# Patient Record
Sex: MALE | Race: WHITE | NOT HISPANIC OR LATINO | Employment: OTHER | URBAN - METROPOLITAN AREA
[De-identification: names, ages, dates, MRNs, and addresses within clinical notes are randomized per-mention and may not be internally consistent; named-entity substitution may affect disease eponyms.]

---

## 2018-09-07 ENCOUNTER — APPOINTMENT (OUTPATIENT)
Dept: LAB | Facility: CLINIC | Age: 83
End: 2018-09-07
Payer: MEDICARE

## 2018-09-07 ENCOUNTER — TRANSCRIBE ORDERS (OUTPATIENT)
Dept: ADMINISTRATIVE | Facility: HOSPITAL | Age: 83
End: 2018-09-07

## 2018-09-07 DIAGNOSIS — E78.5 HYPERLIPIDEMIA, UNSPECIFIED HYPERLIPIDEMIA TYPE: ICD-10-CM

## 2018-09-07 DIAGNOSIS — R35.1 NOCTURIA: ICD-10-CM

## 2018-09-07 DIAGNOSIS — K59.00 CONSTIPATION, UNSPECIFIED CONSTIPATION TYPE: Primary | ICD-10-CM

## 2018-09-07 DIAGNOSIS — K59.00 CONSTIPATION, UNSPECIFIED CONSTIPATION TYPE: ICD-10-CM

## 2018-09-07 DIAGNOSIS — R97.20 ELEVATED PROSTATE SPECIFIC ANTIGEN (PSA): ICD-10-CM

## 2018-09-07 DIAGNOSIS — I10 ESSENTIAL HYPERTENSION, MALIGNANT: ICD-10-CM

## 2018-09-07 LAB
ALBUMIN SERPL BCP-MCNC: 3.6 G/DL (ref 3.5–5)
ALP SERPL-CCNC: 68 U/L (ref 46–116)
ALT SERPL W P-5'-P-CCNC: 18 U/L (ref 12–78)
ANION GAP SERPL CALCULATED.3IONS-SCNC: 6 MMOL/L (ref 4–13)
AST SERPL W P-5'-P-CCNC: 14 U/L (ref 5–45)
BASOPHILS # BLD AUTO: 0.04 THOUSANDS/ΜL (ref 0–0.1)
BASOPHILS NFR BLD AUTO: 1 % (ref 0–1)
BILIRUB SERPL-MCNC: 0.71 MG/DL (ref 0.2–1)
BUN SERPL-MCNC: 18 MG/DL (ref 5–25)
CALCIUM SERPL-MCNC: 8.7 MG/DL (ref 8.3–10.1)
CHLORIDE SERPL-SCNC: 101 MMOL/L (ref 100–108)
CHOLEST SERPL-MCNC: 153 MG/DL (ref 50–200)
CO2 SERPL-SCNC: 32 MMOL/L (ref 21–32)
CREAT SERPL-MCNC: 1.08 MG/DL (ref 0.6–1.3)
EOSINOPHIL # BLD AUTO: 0.32 THOUSAND/ΜL (ref 0–0.61)
EOSINOPHIL NFR BLD AUTO: 4 % (ref 0–6)
ERYTHROCYTE [DISTWIDTH] IN BLOOD BY AUTOMATED COUNT: 13.1 % (ref 11.6–15.1)
GFR SERPL CREATININE-BSD FRML MDRD: 60 ML/MIN/1.73SQ M
GLUCOSE P FAST SERPL-MCNC: 83 MG/DL (ref 65–99)
HCT VFR BLD AUTO: 45.9 % (ref 36.5–49.3)
HDLC SERPL-MCNC: 52 MG/DL (ref 40–60)
HGB BLD-MCNC: 14.6 G/DL (ref 12–17)
IMM GRANULOCYTES # BLD AUTO: 0.02 THOUSAND/UL (ref 0–0.2)
IMM GRANULOCYTES NFR BLD AUTO: 0 % (ref 0–2)
LDLC SERPL CALC-MCNC: 85 MG/DL (ref 0–100)
LYMPHOCYTES # BLD AUTO: 1.89 THOUSANDS/ΜL (ref 0.6–4.47)
LYMPHOCYTES NFR BLD AUTO: 26 % (ref 14–44)
MCH RBC QN AUTO: 30.9 PG (ref 26.8–34.3)
MCHC RBC AUTO-ENTMCNC: 31.8 G/DL (ref 31.4–37.4)
MCV RBC AUTO: 97 FL (ref 82–98)
MONOCYTES # BLD AUTO: 0.74 THOUSAND/ΜL (ref 0.17–1.22)
MONOCYTES NFR BLD AUTO: 10 % (ref 4–12)
NEUTROPHILS # BLD AUTO: 4.41 THOUSANDS/ΜL (ref 1.85–7.62)
NEUTS SEG NFR BLD AUTO: 59 % (ref 43–75)
NONHDLC SERPL-MCNC: 101 MG/DL
NRBC BLD AUTO-RTO: 0 /100 WBCS
PLATELET # BLD AUTO: 164 THOUSANDS/UL (ref 149–390)
PMV BLD AUTO: 12.7 FL (ref 8.9–12.7)
POTASSIUM SERPL-SCNC: 3.9 MMOL/L (ref 3.5–5.3)
PROT SERPL-MCNC: 7.2 G/DL (ref 6.4–8.2)
RBC # BLD AUTO: 4.72 MILLION/UL (ref 3.88–5.62)
SODIUM SERPL-SCNC: 139 MMOL/L (ref 136–145)
TRIGL SERPL-MCNC: 82 MG/DL
WBC # BLD AUTO: 7.42 THOUSAND/UL (ref 4.31–10.16)

## 2018-09-07 PROCEDURE — 80061 LIPID PANEL: CPT

## 2018-09-07 PROCEDURE — 80053 COMPREHEN METABOLIC PANEL: CPT

## 2018-09-07 PROCEDURE — 85025 COMPLETE CBC W/AUTO DIFF WBC: CPT

## 2018-09-07 PROCEDURE — 36415 COLL VENOUS BLD VENIPUNCTURE: CPT

## 2018-11-28 DIAGNOSIS — I10 ESSENTIAL HYPERTENSION: Primary | ICD-10-CM

## 2018-11-28 DIAGNOSIS — K21.9 GASTROESOPHAGEAL REFLUX DISEASE WITHOUT ESOPHAGITIS: ICD-10-CM

## 2018-11-28 DIAGNOSIS — E78.2 MIXED HYPERLIPIDEMIA: ICD-10-CM

## 2018-11-29 RX ORDER — ROSUVASTATIN CALCIUM 5 MG/1
5 TABLET, COATED ORAL DAILY
Qty: 90 TABLET | Refills: 2 | Status: SHIPPED | OUTPATIENT
Start: 2018-11-29 | End: 2019-10-07 | Stop reason: SDUPTHER

## 2018-11-29 RX ORDER — LISINOPRIL AND HYDROCHLOROTHIAZIDE 12.5; 1 MG/1; MG/1
1 TABLET ORAL DAILY
Qty: 90 TABLET | Refills: 2 | Status: SHIPPED | OUTPATIENT
Start: 2018-11-29 | End: 2019-10-07 | Stop reason: SDUPTHER

## 2018-11-29 RX ORDER — OMEPRAZOLE 20 MG/1
20 CAPSULE, DELAYED RELEASE ORAL DAILY
Qty: 90 CAPSULE | Refills: 2 | Status: SHIPPED | OUTPATIENT
Start: 2018-11-29 | End: 2019-10-07 | Stop reason: SDUPTHER

## 2018-12-10 ENCOUNTER — APPOINTMENT (OUTPATIENT)
Dept: LAB | Facility: CLINIC | Age: 83
End: 2018-12-10
Payer: MEDICARE

## 2018-12-10 ENCOUNTER — TRANSCRIBE ORDERS (OUTPATIENT)
Dept: ADMINISTRATIVE | Facility: HOSPITAL | Age: 83
End: 2018-12-10

## 2018-12-10 DIAGNOSIS — E78.5 HYPERLIPIDEMIA, UNSPECIFIED HYPERLIPIDEMIA TYPE: Primary | ICD-10-CM

## 2018-12-10 DIAGNOSIS — E78.00 PURE HYPERCHOLESTEROLEMIA: ICD-10-CM

## 2018-12-10 DIAGNOSIS — I10 ESSENTIAL HYPERTENSION, MALIGNANT: ICD-10-CM

## 2018-12-10 DIAGNOSIS — E78.5 HYPERLIPIDEMIA, UNSPECIFIED HYPERLIPIDEMIA TYPE: ICD-10-CM

## 2018-12-10 LAB
ALBUMIN SERPL BCP-MCNC: 3.8 G/DL (ref 3.5–5)
ALP SERPL-CCNC: 64 U/L (ref 46–116)
ALT SERPL W P-5'-P-CCNC: 17 U/L (ref 12–78)
ANION GAP SERPL CALCULATED.3IONS-SCNC: 4 MMOL/L (ref 4–13)
AST SERPL W P-5'-P-CCNC: 13 U/L (ref 5–45)
BILIRUB SERPL-MCNC: 0.84 MG/DL (ref 0.2–1)
BUN SERPL-MCNC: 14 MG/DL (ref 5–25)
CALCIUM SERPL-MCNC: 9.7 MG/DL (ref 8.3–10.1)
CHLORIDE SERPL-SCNC: 102 MMOL/L (ref 100–108)
CO2 SERPL-SCNC: 32 MMOL/L (ref 21–32)
CREAT SERPL-MCNC: 1.03 MG/DL (ref 0.6–1.3)
GFR SERPL CREATININE-BSD FRML MDRD: 63 ML/MIN/1.73SQ M
GLUCOSE P FAST SERPL-MCNC: 80 MG/DL (ref 65–99)
POTASSIUM SERPL-SCNC: 3.9 MMOL/L (ref 3.5–5.3)
PROT SERPL-MCNC: 7.5 G/DL (ref 6.4–8.2)
PSA SERPL-MCNC: 4.3 NG/ML (ref 0–4)
SODIUM SERPL-SCNC: 138 MMOL/L (ref 136–145)

## 2018-12-10 PROCEDURE — 36415 COLL VENOUS BLD VENIPUNCTURE: CPT

## 2018-12-10 PROCEDURE — 80053 COMPREHEN METABOLIC PANEL: CPT

## 2018-12-10 PROCEDURE — 84153 ASSAY OF PSA TOTAL: CPT

## 2018-12-12 ENCOUNTER — OFFICE VISIT (OUTPATIENT)
Dept: FAMILY MEDICINE CLINIC | Facility: CLINIC | Age: 83
End: 2018-12-12
Payer: MEDICARE

## 2018-12-12 VITALS
BODY MASS INDEX: 30.3 KG/M2 | HEIGHT: 63 IN | DIASTOLIC BLOOD PRESSURE: 72 MMHG | SYSTOLIC BLOOD PRESSURE: 134 MMHG | OXYGEN SATURATION: 94 % | HEART RATE: 65 BPM | WEIGHT: 171 LBS | TEMPERATURE: 98.8 F

## 2018-12-12 DIAGNOSIS — K21.9 GASTROESOPHAGEAL REFLUX DISEASE WITHOUT ESOPHAGITIS: ICD-10-CM

## 2018-12-12 DIAGNOSIS — I10 ESSENTIAL HYPERTENSION: Primary | ICD-10-CM

## 2018-12-12 DIAGNOSIS — Z00.00 MEDICARE ANNUAL WELLNESS VISIT, SUBSEQUENT: ICD-10-CM

## 2018-12-12 DIAGNOSIS — E78.2 MIXED HYPERLIPIDEMIA: ICD-10-CM

## 2018-12-12 PROCEDURE — G0439 PPPS, SUBSEQ VISIT: HCPCS | Performed by: FAMILY MEDICINE

## 2018-12-12 PROCEDURE — 99214 OFFICE O/P EST MOD 30 MIN: CPT | Performed by: FAMILY MEDICINE

## 2018-12-12 NOTE — ASSESSMENT & PLAN NOTE
Patient has a central hypertension here today for general checkup and follow-up evaluation  He lives alone at this point his wife  5 years ago and he comes in for general checkup he is doing well could no new complaints or changes in his life  No chest pain shortness of breath or dizziness    Will continue the same medication lisinopril hydrochlorothiazide

## 2018-12-12 NOTE — ASSESSMENT & PLAN NOTE
Patient is here for lab review and cholesterol medication assessment  He stays on the statin drug at Crestor 5 mg and is doing well no new changes at this time will re-evaluate blood work at next visit

## 2018-12-12 NOTE — ASSESSMENT & PLAN NOTE
Patient has no further dyspepsia indigestion or blood in his stools doing well with omeprazole continue this and reassess at next appointment

## 2018-12-12 NOTE — PROGRESS NOTES
Assessment/Plan:       Problem List Items Addressed This Visit     Essential hypertension - Primary     Patient has a central hypertension here today for general checkup and follow-up evaluation  He lives alone at this point his wife  5 years ago and he comes in for general checkup he is doing well could no new complaints or changes in his life  No chest pain shortness of breath or dizziness  Will continue the same medication lisinopril hydrochlorothiazide         Relevant Orders    Comprehensive metabolic panel    Mixed hyperlipidemia     Patient is here for lab review and cholesterol medication assessment  He stays on the statin drug at Crestor 5 mg and is doing well no new changes at this time will re-evaluate blood work at next visit  Relevant Orders    Lipid panel    Gastroesophageal reflux disease without esophagitis     Patient has no further dyspepsia indigestion or blood in his stools doing well with omeprazole continue this and reassess at next appointment         Relevant Orders    CBC and differential    Medicare annual wellness visit, subsequent            Subjective:      Patient ID: Saturnino Poole is a 80 y o  male  HPI    The following portions of the patient's history were reviewed and updated as appropriate: allergies, current medications, past family history, past medical history, past social history, past surgical history and problem list     Review of Systems      Objective:      /72   Pulse 65   Temp 98 8 °F (37 1 °C)   Ht 5' 3" (1 6 m)   Wt 77 6 kg (171 lb)   SpO2 94%   BMI 30 29 kg/m²        Physical Exam     Data:    Laboratory Results: I have personally reviewed the pertinent laboratory results/reports   Radiology/Other Diagnostic Testing Results: I have personally reviewed pertinent reports         Lab Results   Component Value Date    WBC 7 42 2018    HGB 14 6 2018    HCT 45 9 2018    MCV 97 2018     2018     Lab Results Component Value Date    K 3 9 12/10/2018     12/10/2018    CO2 32 12/10/2018    BUN 14 12/10/2018    CREATININE 1 03 12/10/2018    GLUF 80 12/10/2018    CALCIUM 9 7 12/10/2018    AST 13 12/10/2018    ALT 17 12/10/2018    ALKPHOS 64 12/10/2018    EGFR 63 12/10/2018     Lab Results   Component Value Date    CHOLESTEROL 153 09/07/2018     Lab Results   Component Value Date    HDL 52 09/07/2018     Lab Results   Component Value Date    LDLCALC 85 09/07/2018     Lab Results   Component Value Date    TRIG 82 09/07/2018     No results found for: CHOLHDL  No results found for: [id number], TSH  No results found for: HGBA1C  Lab Results   Component Value Date    PSA 4 3 (H) 12/10/2018       [facility], DO

## 2018-12-12 NOTE — PATIENT INSTRUCTIONS
Hypertension   AMBULATORY CARE:   Hypertension  is high blood pressure (BP)  Your BP is the force of your blood moving against the walls of your arteries  Normal BP is less than 120/80  Prehypertension is between 120/80 and 139/89  Hypertension is 140/90 or higher  Hypertension causes your BP to get so high that your heart has to work much harder than normal  This can damage your heart  You can control hypertension with a healthy lifestyle or medicines  A controlled blood pressure helps protect your organs, such as your heart, lungs, brain, and kidneys  Common symptoms include the following:   · Headache     · Blurred vision     · Chest pain     · Dizziness or weakness     · Trouble breathing    · Nosebleeds  Call 911 for any of the following:   · You have discomfort in your chest that feels like squeezing, pressure, fullness, or pain  · You become confused or have difficulty speaking  · You suddenly feel lightheaded or have trouble breathing  · You have pain or discomfort in your back, neck, jaw, stomach, or arm  Seek care immediately if:   · You have a severe headache or vision loss  · You have weakness in an arm or leg  Contact your healthcare provider if:   · You feel faint, dizzy, confused, or drowsy  · You have been taking your BP medicine and your BP is still higher than your healthcare provider says it should be  · You have questions or concerns about your condition or care  Treatment for hypertension  may include medicine to lower your BP and lower your cholesterol level  A low cholesterol level helps prevent heart disease and makes it easier to control your blood pressure  You may also need to make lifestyle changes  Take your medicine exactly as directed  Manage hypertension:  Talk with your healthcare provider about these and other ways to manage hypertension:  · Check your BP at home  Sit and rest for 5 minutes before you take your BP   Extend your arm and support it on a flat surface  Your arm should be at the same level as your heart  Follow the directions that came with your BP monitor  If possible, take at least 2 BP readings each time  Take your BP at least twice a day at the same times each day, such as morning and evening  Keep a record of your BP readings and bring it to your follow-up visits  Ask your healthcare provider what your BP should be  · Limit sodium (salt) as directed  Too much sodium can affect your fluid balance  Check labels to find low-sodium or no-salt-added foods  Some low-sodium foods use potassium salts for flavor  Too much potassium can also cause health problems  Your healthcare provider will tell you how much sodium and potassium are safe for you to have in a day  He or she may recommend that you limit sodium to 2,300 mg a day  · Follow the meal plan recommended by your healthcare provider  A dietitian or your provider can give you more information on low-sodium plans or the DASH (Dietary Approaches to Stop Hypertension) eating plan  The DASH plan is low in sodium, unhealthy fats, and total fat  It is high in potassium, calcium, and fiber  · Exercise to maintain a healthy weight  Exercise at least 30 minutes per day, on most days of the week  This will help decrease your blood pressure  Ask your healthcare provider about the best exercise plan for you  · Decrease stress  This may help lower your BP  Learn ways to relax, such as deep breathing or listening to music  · Limit alcohol  Women should limit alcohol to 1 drink a day  Men should limit alcohol to 2 drinks a day  A drink of alcohol is 12 ounces of beer, 5 ounces of wine, or 1½ ounces of liquor  · Do not smoke  Nicotine and other chemicals in cigarettes and cigars can increase your BP and also cause lung damage  Ask your healthcare provider for information if you currently smoke and need help to quit  E-cigarettes or smokeless tobacco still contain nicotine  Talk to your healthcare provider before you use these products  · Manage any other health conditions you have  Health conditions such as diabetes can increase your risk for hypertension  Follow your healthcare provider's instructions and take all your medicines as directed  Follow up with your healthcare provider as directed: You will need to return to have your BP checked and to have other lab tests done  Write down your questions so you remember to ask them during your visits  © 2017 [employer] Information is for End User's use only and may not be sold, redistributed or otherwise used for commercial purposes  All illustrations and images included in CareNotes® are the copyrighted property of [employer]  or [employer]  The above information is an  only  It is not intended as medical advice for individual conditions or treatments  Talk to your doctor, nurse or pharmacist before following any medical regimen to see if it is safe and effective for you

## 2018-12-12 NOTE — PROGRESS NOTES
Assessment/Plan:       Problem List Items Addressed This Visit     Essential hypertension - Primary     Patient has a central hypertension here today for general checkup and follow-up evaluation  He lives alone at this point his wife  5 years ago and he comes in for general checkup he is doing well could no new complaints or changes in his life  No chest pain shortness of breath or dizziness  Will continue the same medication lisinopril hydrochlorothiazide         Mixed hyperlipidemia     Patient is here for lab review and cholesterol medication assessment  He stays on the statin drug at Crestor 5 mg and is doing well no new changes at this time will re-evaluate blood work at next visit  Gastroesophageal reflux disease without esophagitis     Patient has no further dyspepsia indigestion or blood in his stools doing well with omeprazole continue this and reassess at next appointment                 Subjective:      Patient ID: Carlitos Kennedy is a 80 y o  male      HPI    The following portions of the patient's history were reviewed and updated as appropriate: allergies, current medications, past family history, past medical history, past social history, past surgical history and problem list     Review of Systems      Objective:      /72   Pulse 65   Temp 98 8 °F (37 1 °C)   Ht 5' 3" (1 6 m)   Wt 77 6 kg (171 lb)   SpO2 94%   BMI 30 29 kg/m²        Physical Exam     Data:    Laboratory Results:   Radiology/Other Diagnostic Testing Results:      Lab Results   Component Value Date    WBC 7 42 2018    HGB 14 6 2018    HCT 45 9 2018    MCV 97 2018     2018     Lab Results   Component Value Date    K 3 9 12/10/2018     12/10/2018    CO2 32 12/10/2018    BUN 14 12/10/2018    CREATININE 1 03 12/10/2018    GLUF 80 12/10/2018    CALCIUM 9 7 12/10/2018    AST 13 12/10/2018    ALT 17 12/10/2018    ALKPHOS 64 12/10/2018    EGFR 63 12/10/2018     Lab Results Component Value Date    CHOLESTEROL 153 09/07/2018     Lab Results   Component Value Date    HDL 52 09/07/2018     Lab Results   Component Value Date    LDLCALC 85 09/07/2018     Lab Results   Component Value Date    TRIG 82 09/07/2018     No results found for: CHOLHDL  No results found for: [id number], TSH  No results found for: HGBA1C  Lab Results   Component Value Date    PSA 4 3 (H) 12/10/2018       Diogenes Chaudhari DO

## 2018-12-12 NOTE — PROGRESS NOTES
Assessment and Plan:    Problem List Items Addressed This Visit     None        Health Maintenance Due   Topic Date Due    Depression Screening PHQ  11/02/1926    Medicare Annual Wellness Visit (AWV)  11/02/[birth year]26    DTaP,Tdap,and Td Vaccines (1 - Tdap) 11/02/1947    Fall Risk  11/02/1991    Pneumococcal PPSV23/PCV13 65+ Years / Low and Medium Risk (1 of 2 - PCV13) 11/02/1991         HPI:  Jonathan Diamond is a 80 y o  male here for his Subsequent Wellness Visit  There is no problem list on file for this patient  No past medical history on file  No past surgical history on file  No family history on file  History   Smoking Status    Former Smoker   Smokeless Tobacco    Never Used     History   Alcohol use Not on file      History   Drug Use No       Current Outpatient Prescriptions   Medication Sig Dispense Refill    lisinopril-hydrochlorothiazide (PRINZIDE,ZESTORETIC) 10-12 5 MG per tablet Take 1 tablet by mouth daily 90 tablet 2    omeprazole (PriLOSEC) 20 mg delayed release capsule Take 1 capsule (20 mg total) by mouth daily 90 capsule 2    rosuvastatin (CRESTOR) 5 mg tablet Take 1 tablet (5 mg total) by mouth daily 90 tablet 2     No current facility-administered medications for this visit  No Known Allergies  Immunization History   Administered Date(s) Administered    Influenza 09/26/2018       Patient Care Team:  Denise Shaw DO as PCP - General (Family Medicine)    Medicare Screening Tests and Risk Assessments:  Last Medicare Wellness visit information reviewed, patient interviewed and updates made to the record today  Health Risk Assessment:  Patient rates overall health as fair  Patient feels that their physical health rating is Same  Eyesight was rated as Same  Hearing was rated as Same  Patient feels that their emotional and mental health rating is Same  Pain experienced by patient in the last 7 days has been None   Patient states that he has experienced no weight loss or gain in last 6 months  Emotional/Mental Health:  Patient has been feeling nervous/anxious  PHQ-9 Depression Screening:    Frequency of the following problems over the past two weeks:      1  Little interest or pleasure in doing things: 0 - not at all      2  Feeling down, depressed, or hopeless: 0 - not at all  PHQ-2 Score: 0          Broken Bones/Falls: Fall Risk Assessment:    In the past year, patient has experienced: No history of falling in past year          Bladder/Bowel:  Patient has not leaked urine accidently in the last six months  Patient reports no loss of bowel control  Immunizations:  Patient has had a flu vaccination within the last year  Patient has not received a pneumonia shot  Patient has not received a shingles shot  Patient has not received tetanus/diphtheria shot  Home Safety:  Patient has trouble with stairs inside or outside of their home  Patient currently reports that there are no safety hazards present in home, working smoke alarms, no working carbon monoxide detectors  Preventative Screenings:   no prostate cancer screen performed, no colon cancer screen completed, no cholesterol screen completed, no glaucoma eye exam completed    Nutrition:  Current diet: Regular with servings of the following:    Medications:  Patient is not currently taking any over-the-counter supplements  Patient is able to manage medications  Lifestyle Choices:  Patient reports no tobacco use  Patient has not smoked or used tobacco in the past   Patient reports no alcohol use  Patient drives a vehicle  Patient does not wear seat belt  Current level of exercise of physical activity described by patient as: Active as can be          Activities of Daily Living:  Can get out of bed by his or her self, able to dress self, able to make own meals, able to do own shopping, able to bathe self, can do own laundry/housekeeping, can manage own money, pay bills and track expenses    Previous Hospitalizations:  No hospitalization or ED visit in past 12 months        Advanced Directives:  Patient has decided on a power of   Patient has spoken to designated power of   Patient has completed advanced directive  Preventative Screening/Counseling:      Cardiovascular:      General: Risks and Benefits Discussed      Counseling: Healthy Diet and Healthy Weight          Diabetes:      General: Screening Not Indicated      Counseling: Healthy Diet and Healthy Weight          Colorectal Cancer:      General: Risks and Benefits Discussed      Counseling: high fiber diet          Prostate Cancer:      General: Screening Not Indicated          Osteoporosis:      General: Screening Not Indicated          AAA:      General: Risks and Benefits Discussed          Glaucoma:      General: Risks and Benefits Discussed and Screening Current          HIV:      General: Screening Not Indicated          Hepatitis C:      General: Screening Not Indicated        Advanced Directives:   End of life assessment reviewed with patient

## 2018-12-12 NOTE — PROGRESS NOTES
Assessment/Plan:       Problem List Items Addressed This Visit     Essential hypertension - Primary     Patient has a central hypertension here today for general checkup and follow-up evaluation  He lives alone at this point his wife  5 years ago and he comes in for general checkup he is doing well could no new complaints or changes in his life  No chest pain shortness of breath or dizziness  Will continue the same medication lisinopril hydrochlorothiazide         Relevant Orders    Comprehensive metabolic panel    Mixed hyperlipidemia     Patient is here for lab review and cholesterol medication assessment  He stays on the statin drug at Crestor 5 mg and is doing well no new changes at this time will re-evaluate blood work at next visit  Relevant Orders    Lipid panel    Gastroesophageal reflux disease without esophagitis     Patient has no further dyspepsia indigestion or blood in his stools doing well with omeprazole continue this and reassess at next appointment         Relevant Orders    CBC and differential    Medicare annual wellness visit, subsequent            Subjective:      Patient ID: Carlitos Kennedy is a 80 y o  male  Patient presents for checkup today review of labs and medications he is feeling well in general no new complaints or problems at this time  The following portions of the patient's history were reviewed and updated as appropriate: allergies, current medications, past family history, past medical history, past social history, past surgical history and problem list     Review of Systems   Constitutional: Negative for chills, fatigue and fever  HENT: Negative for congestion, nosebleeds, rhinorrhea, sinus pressure and sore throat  Eyes: Negative for discharge and redness  Respiratory: Negative for cough and shortness of breath  Cardiovascular: Negative for chest pain, palpitations and leg swelling  Gastrointestinal: Positive for constipation   Negative for abdominal pain, blood in stool and nausea  Endocrine: Negative for cold intolerance, heat intolerance and polyuria  Genitourinary: Negative for dysuria and frequency  Occasional nocturia once or twice at night   Musculoskeletal: Positive for back pain  Negative for arthralgias and myalgias  Skin: Negative for rash  Neurological: Negative for dizziness, weakness and headaches  Hematological: Negative for adenopathy  Psychiatric/Behavioral: Negative for behavioral problems and sleep disturbance  The patient is not nervous/anxious  Objective:      /72   Pulse 65   Temp 98 8 °F (37 1 °C)   Ht 5' 3" (1 6 m)   Wt 77 6 kg (171 lb)   SpO2 94%   BMI 30 29 kg/m²        Physical Exam   Constitutional: He is oriented to person, place, and time  He appears well-developed and well-nourished  HENT:   Head: Normocephalic and atraumatic  Right Ear: External ear normal    Left Ear: External ear normal    Nose: Nose normal    Mouth/Throat: Oropharynx is clear and moist    Eyes: Pupils are equal, round, and reactive to light  Conjunctivae and EOM are normal  No scleral icterus  Neck: Normal range of motion  Neck supple  No JVD present  No thyromegaly present  Cardiovascular: Normal rate, regular rhythm and normal heart sounds  No murmur heard  Pulmonary/Chest: Effort normal and breath sounds normal  He has no wheezes  He has no rales  He exhibits no tenderness  Abdominal: Soft  Bowel sounds are normal  He exhibits no distension and no mass  There is no tenderness  There is no rebound and no guarding  Musculoskeletal: Normal range of motion  He exhibits no edema, tenderness or deformity  Bilateral lower extremity edema unchanged from past visit this is 1 to 2+   Lymphadenopathy:     He has no cervical adenopathy  Neurological: He is alert and oriented to person, place, and time  He has normal reflexes  No cranial nerve deficit  Skin: Skin is warm and dry  No rash noted   No erythema  Psychiatric: He has a normal mood and affect  His behavior is normal  Judgment and thought content normal    Nursing note and vitals reviewed  Data:    Laboratory Results: I have personally reviewed the pertinent laboratory results/reports   Radiology/Other Diagnostic Testing Results: I have personally reviewed pertinent reports         Lab Results   Component Value Date    WBC 7 42 09/07/2018    HGB 14 6 09/07/2018    HCT 45 9 09/07/2018    MCV 97 09/07/2018     09/07/2018     Lab Results   Component Value Date    K 3 9 12/10/2018     12/10/2018    CO2 32 12/10/2018    BUN 14 12/10/2018    CREATININE 1 03 12/10/2018    GLUF 80 12/10/2018    CALCIUM 9 7 12/10/2018    AST 13 12/10/2018    ALT 17 12/10/2018    ALKPHOS 64 12/10/2018    EGFR 63 12/10/2018     Lab Results   Component Value Date    CHOLESTEROL 153 09/07/2018     Lab Results   Component Value Date    HDL 52 09/07/2018     Lab Results   Component Value Date    LDLCALC 85 09/07/2018     Lab Results   Component Value Date    TRIG 82 09/07/2018     No results found for: CHOLHDL  No results found for: [id number], TSH  No results found for: HGBA1C  Lab Results   Component Value Date    PSA 4 3 (H) 12/10/2018       Francesca Moffett DO

## 2019-03-12 ENCOUNTER — OFFICE VISIT (OUTPATIENT)
Dept: FAMILY MEDICINE CLINIC | Facility: CLINIC | Age: 84
End: 2019-03-12
Payer: MEDICARE

## 2019-03-12 VITALS
BODY MASS INDEX: 30.55 KG/M2 | HEIGHT: 63 IN | SYSTOLIC BLOOD PRESSURE: 121 MMHG | WEIGHT: 172.4 LBS | OXYGEN SATURATION: 94 % | DIASTOLIC BLOOD PRESSURE: 80 MMHG | TEMPERATURE: 98.4 F | HEART RATE: 76 BPM

## 2019-03-12 DIAGNOSIS — E78.2 MIXED HYPERLIPIDEMIA: ICD-10-CM

## 2019-03-12 DIAGNOSIS — I10 ESSENTIAL HYPERTENSION: Primary | ICD-10-CM

## 2019-03-12 DIAGNOSIS — K21.9 GASTROESOPHAGEAL REFLUX DISEASE WITHOUT ESOPHAGITIS: ICD-10-CM

## 2019-03-12 DIAGNOSIS — Z12.5 SCREENING FOR PROSTATE CANCER: ICD-10-CM

## 2019-03-12 PROCEDURE — 99213 OFFICE O/P EST LOW 20 MIN: CPT | Performed by: FAMILY MEDICINE

## 2019-03-12 NOTE — ASSESSMENT & PLAN NOTE
Mixed hyperlipidemia with good control of total cholesterol at 153 HDL 52 LDL 85 from last profile done in the fall high will repeat his lipid profile for the next office visit    Continue on his statin medication he has no side effects or problems at this time

## 2019-03-12 NOTE — ASSESSMENT & PLAN NOTE
GERD symptoms controlled well with omeprazole at this time no changes to his current treatment plan continue this medication indefinitely    Patient is instructed to notify me immediately or go to the emergency room if he sees blood in his bowel movements

## 2019-03-12 NOTE — ASSESSMENT & PLAN NOTE
Essential hypertension well controlled at 120/80 at this time left arm    Continue his current medication without change at this time and follow up on a 4 month interval

## 2019-03-12 NOTE — PROGRESS NOTES
Assessment/Plan:       Problem List Items Addressed This Visit        Digestive    Gastroesophageal reflux disease without esophagitis     GERD symptoms controlled well with omeprazole at this time no changes to his current treatment plan continue this medication indefinitely  Patient is instructed to notify me immediately or go to the emergency room if he sees blood in his bowel movements            Cardiovascular and Mediastinum    Essential hypertension - Primary     Essential hypertension well controlled at 120/80 at this time left arm  Continue his current medication without change at this time and follow up on a 4 month interval            Other    Mixed hyperlipidemia     Mixed hyperlipidemia with good control of total cholesterol at 153 HDL 52 LDL 85 from last profile done in the fall high will repeat his lipid profile for the next office visit  Continue on his statin medication he has no side effects or problems at this time                 Subjective:      Patient ID: Estela Vasquez is a 80 y o  male  This patient presents today for follow-up care and review of his health status and lab work review of his medications and discussion of his overall well-being  He lives alone at this time at age 80 and wants to remain independent  He continues to drive and carry out his normal activities of daily living  He is alert and oriented not complaining of memory loss  His wife  several years ago and his family is not in the immediate area  The following portions of the patient's history were reviewed and updated as appropriate: allergies, current medications, past family history, past medical history, past social history, past surgical history and problem list     Review of Systems   Constitutional: Negative for chills, fatigue and fever  HENT: Negative for congestion, nosebleeds, rhinorrhea, sinus pressure and sore throat  Eyes: Negative for discharge and redness     Respiratory: Negative for cough and shortness of breath  Cardiovascular: Negative for chest pain, palpitations and leg swelling  Gastrointestinal: Negative for abdominal pain, blood in stool and nausea  Endocrine: Negative for cold intolerance, heat intolerance and polyuria  Genitourinary: Negative for dysuria and frequency  Musculoskeletal: Negative for arthralgias, back pain and myalgias  Skin: Negative for rash  Neurological: Negative for dizziness, weakness and headaches  Hematological: Negative for adenopathy  Psychiatric/Behavioral: Negative for behavioral problems and sleep disturbance  The patient is not nervous/anxious  Objective:      /80 (BP Location: Left arm, Patient Position: Sitting)   Pulse 76   Temp 98 4 °F (36 9 °C) (Tympanic)   Ht 5' 3" (1 6 m)   Wt 78 2 kg (172 lb 6 4 oz)   SpO2 94%   BMI 30 54 kg/m²        Physical Exam   Constitutional: He is oriented to person, place, and time  He appears well-developed and well-nourished  HENT:   Head: Normocephalic and atraumatic  Right Ear: External ear normal    Left Ear: External ear normal    Nose: Nose normal    Mouth/Throat: Oropharynx is clear and moist    Eyes: Pupils are equal, round, and reactive to light  Conjunctivae and EOM are normal  No scleral icterus  Neck: Normal range of motion  Neck supple  No JVD present  No thyromegaly present  Cardiovascular: Normal rate, regular rhythm and normal heart sounds  No murmur heard  Pulmonary/Chest: Effort normal and breath sounds normal  He has no wheezes  He has no rales  He exhibits no tenderness  Abdominal: Soft  Bowel sounds are normal  He exhibits no distension and no mass  There is no tenderness  There is no rebound and no guarding  Musculoskeletal: Normal range of motion  He exhibits no edema, tenderness or deformity  Mild low back pain lumbar 1 through 5 bilateral hip pain but good range of motion overall no knee pain ankle or foot pain at this time  Lymphadenopathy:     He has no cervical adenopathy  Neurological: He is alert and oriented to person, place, and time  He has normal reflexes  He displays normal reflexes  No cranial nerve deficit  Skin: Skin is warm and dry  No rash noted  No erythema  Psychiatric: He has a normal mood and affect  His behavior is normal  Judgment and thought content normal    Nursing note and vitals reviewed  Data:    Laboratory Results: I have personally reviewed the pertinent laboratory results/reports   Radiology/Other Diagnostic Testing Results: I have personally reviewed pertinent reports         Lab Results   Component Value Date    WBC 7 42 09/07/2018    HGB 14 6 09/07/2018    HCT 45 9 09/07/2018    MCV 97 09/07/2018     09/07/2018     Lab Results   Component Value Date    K 3 9 12/10/2018     12/10/2018    CO2 32 12/10/2018    BUN 14 12/10/2018    CREATININE 1 03 12/10/2018    GLUF 80 12/10/2018    CALCIUM 9 7 12/10/2018    AST 13 12/10/2018    ALT 17 12/10/2018    ALKPHOS 64 12/10/2018    EGFR 63 12/10/2018     Lab Results   Component Value Date    CHOLESTEROL 153 09/07/2018     Lab Results   Component Value Date    HDL 52 09/07/2018     Lab Results   Component Value Date    LDLCALC 85 09/07/2018     Lab Results   Component Value Date    TRIG 82 09/07/2018     No results found for: CHOLHDL  No results found for: [id number], TSH  No results found for: HGBA1C  Lab Results   Component Value Date    PSA 4 3 (H) 12/10/2018       Saba Olmedo DO

## 2019-03-12 NOTE — PATIENT INSTRUCTIONS

## 2019-06-10 ENCOUNTER — APPOINTMENT (OUTPATIENT)
Dept: LAB | Facility: CLINIC | Age: 84
End: 2019-06-10
Payer: MEDICARE

## 2019-06-10 DIAGNOSIS — K21.9 GASTROESOPHAGEAL REFLUX DISEASE WITHOUT ESOPHAGITIS: ICD-10-CM

## 2019-06-10 DIAGNOSIS — E78.2 MIXED HYPERLIPIDEMIA: ICD-10-CM

## 2019-06-10 DIAGNOSIS — I10 ESSENTIAL HYPERTENSION: ICD-10-CM

## 2019-06-10 DIAGNOSIS — Z12.5 SCREENING FOR PROSTATE CANCER: ICD-10-CM

## 2019-06-10 LAB
ALBUMIN SERPL BCP-MCNC: 3.7 G/DL (ref 3.5–5)
ALP SERPL-CCNC: 65 U/L (ref 46–116)
ALT SERPL W P-5'-P-CCNC: 19 U/L (ref 12–78)
ANION GAP SERPL CALCULATED.3IONS-SCNC: 4 MMOL/L (ref 4–13)
AST SERPL W P-5'-P-CCNC: 14 U/L (ref 5–45)
BASOPHILS # BLD AUTO: 0.04 THOUSANDS/ΜL (ref 0–0.1)
BASOPHILS NFR BLD AUTO: 1 % (ref 0–1)
BILIRUB SERPL-MCNC: 0.78 MG/DL (ref 0.2–1)
BUN SERPL-MCNC: 16 MG/DL (ref 5–25)
CALCIUM SERPL-MCNC: 9 MG/DL (ref 8.3–10.1)
CHLORIDE SERPL-SCNC: 103 MMOL/L (ref 100–108)
CHOLEST SERPL-MCNC: 155 MG/DL (ref 50–200)
CO2 SERPL-SCNC: 33 MMOL/L (ref 21–32)
CREAT SERPL-MCNC: 1.03 MG/DL (ref 0.6–1.3)
EOSINOPHIL # BLD AUTO: 0.36 THOUSAND/ΜL (ref 0–0.61)
EOSINOPHIL NFR BLD AUTO: 5 % (ref 0–6)
ERYTHROCYTE [DISTWIDTH] IN BLOOD BY AUTOMATED COUNT: 13.1 % (ref 11.6–15.1)
GFR SERPL CREATININE-BSD FRML MDRD: 63 ML/MIN/1.73SQ M
GLUCOSE P FAST SERPL-MCNC: 82 MG/DL (ref 65–99)
HCT VFR BLD AUTO: 43.9 % (ref 36.5–49.3)
HDLC SERPL-MCNC: 53 MG/DL (ref 40–60)
HGB BLD-MCNC: 14.1 G/DL (ref 12–17)
IMM GRANULOCYTES # BLD AUTO: 0.02 THOUSAND/UL (ref 0–0.2)
IMM GRANULOCYTES NFR BLD AUTO: 0 % (ref 0–2)
LDLC SERPL CALC-MCNC: 83 MG/DL (ref 0–100)
LYMPHOCYTES # BLD AUTO: 1.61 THOUSANDS/ΜL (ref 0.6–4.47)
LYMPHOCYTES NFR BLD AUTO: 24 % (ref 14–44)
MCH RBC QN AUTO: 30.7 PG (ref 26.8–34.3)
MCHC RBC AUTO-ENTMCNC: 32.1 G/DL (ref 31.4–37.4)
MCV RBC AUTO: 96 FL (ref 82–98)
MONOCYTES # BLD AUTO: 0.77 THOUSAND/ΜL (ref 0.17–1.22)
MONOCYTES NFR BLD AUTO: 12 % (ref 4–12)
NEUTROPHILS # BLD AUTO: 3.87 THOUSANDS/ΜL (ref 1.85–7.62)
NEUTS SEG NFR BLD AUTO: 58 % (ref 43–75)
NONHDLC SERPL-MCNC: 102 MG/DL
NRBC BLD AUTO-RTO: 0 /100 WBCS
PLATELET # BLD AUTO: 168 THOUSANDS/UL (ref 149–390)
PMV BLD AUTO: 12.8 FL (ref 8.9–12.7)
POTASSIUM SERPL-SCNC: 3.9 MMOL/L (ref 3.5–5.3)
PROT SERPL-MCNC: 7.1 G/DL (ref 6.4–8.2)
RBC # BLD AUTO: 4.59 MILLION/UL (ref 3.88–5.62)
SODIUM SERPL-SCNC: 140 MMOL/L (ref 136–145)
TRIGL SERPL-MCNC: 93 MG/DL
WBC # BLD AUTO: 6.67 THOUSAND/UL (ref 4.31–10.16)

## 2019-06-10 PROCEDURE — G0103 PSA SCREENING: HCPCS

## 2019-06-10 PROCEDURE — 80061 LIPID PANEL: CPT

## 2019-06-10 PROCEDURE — 36415 COLL VENOUS BLD VENIPUNCTURE: CPT

## 2019-06-10 PROCEDURE — 80053 COMPREHEN METABOLIC PANEL: CPT

## 2019-06-10 PROCEDURE — 85025 COMPLETE CBC W/AUTO DIFF WBC: CPT

## 2019-06-11 LAB
PSA FREE MFR SERPL: 31.2 %
PSA FREE SERPL-MCNC: 1.34 NG/ML
PSA SERPL-MCNC: 4.3 NG/ML (ref 0–4)

## 2019-06-12 ENCOUNTER — OFFICE VISIT (OUTPATIENT)
Dept: FAMILY MEDICINE CLINIC | Facility: CLINIC | Age: 84
End: 2019-06-12
Payer: MEDICARE

## 2019-06-12 VITALS
DIASTOLIC BLOOD PRESSURE: 70 MMHG | HEIGHT: 63 IN | HEART RATE: 75 BPM | SYSTOLIC BLOOD PRESSURE: 120 MMHG | WEIGHT: 176 LBS | TEMPERATURE: 99 F | OXYGEN SATURATION: 95 % | BODY MASS INDEX: 31.18 KG/M2

## 2019-06-12 DIAGNOSIS — R97.20 ELEVATED PSA: ICD-10-CM

## 2019-06-12 DIAGNOSIS — M25.472 ANKLE EDEMA, BILATERAL: ICD-10-CM

## 2019-06-12 DIAGNOSIS — M25.471 ANKLE EDEMA, BILATERAL: ICD-10-CM

## 2019-06-12 DIAGNOSIS — K21.9 GASTROESOPHAGEAL REFLUX DISEASE WITHOUT ESOPHAGITIS: ICD-10-CM

## 2019-06-12 DIAGNOSIS — E78.2 MIXED HYPERLIPIDEMIA: ICD-10-CM

## 2019-06-12 DIAGNOSIS — Z00.00 MEDICARE ANNUAL WELLNESS VISIT, SUBSEQUENT: ICD-10-CM

## 2019-06-12 DIAGNOSIS — I10 ESSENTIAL HYPERTENSION: Primary | ICD-10-CM

## 2019-06-12 PROCEDURE — 99214 OFFICE O/P EST MOD 30 MIN: CPT | Performed by: FAMILY MEDICINE

## 2019-06-17 ENCOUNTER — OFFICE VISIT (OUTPATIENT)
Dept: FAMILY MEDICINE CLINIC | Facility: CLINIC | Age: 84
End: 2019-06-17
Payer: MEDICARE

## 2019-06-17 VITALS
WEIGHT: 176 LBS | BODY MASS INDEX: 31.18 KG/M2 | TEMPERATURE: 98.8 F | OXYGEN SATURATION: 95 % | HEIGHT: 63 IN | SYSTOLIC BLOOD PRESSURE: 124 MMHG | DIASTOLIC BLOOD PRESSURE: 70 MMHG | HEART RATE: 84 BPM

## 2019-06-17 DIAGNOSIS — M54.50 ACUTE BILATERAL LOW BACK PAIN WITHOUT SCIATICA: Primary | ICD-10-CM

## 2019-06-17 PROCEDURE — 99213 OFFICE O/P EST LOW 20 MIN: CPT | Performed by: FAMILY MEDICINE

## 2019-09-12 ENCOUNTER — OFFICE VISIT (OUTPATIENT)
Dept: FAMILY MEDICINE CLINIC | Facility: CLINIC | Age: 84
End: 2019-09-12
Payer: MEDICARE

## 2019-09-12 VITALS
DIASTOLIC BLOOD PRESSURE: 62 MMHG | HEIGHT: 63 IN | HEART RATE: 69 BPM | SYSTOLIC BLOOD PRESSURE: 112 MMHG | WEIGHT: 171.4 LBS | OXYGEN SATURATION: 96 % | TEMPERATURE: 99.5 F | BODY MASS INDEX: 30.37 KG/M2

## 2019-09-12 DIAGNOSIS — M25.471 ANKLE EDEMA, BILATERAL: ICD-10-CM

## 2019-09-12 DIAGNOSIS — Z23 ENCOUNTER FOR VACCINATION: ICD-10-CM

## 2019-09-12 DIAGNOSIS — K21.9 GASTROESOPHAGEAL REFLUX DISEASE WITHOUT ESOPHAGITIS: ICD-10-CM

## 2019-09-12 DIAGNOSIS — I10 ESSENTIAL HYPERTENSION: Primary | ICD-10-CM

## 2019-09-12 DIAGNOSIS — E78.2 MIXED HYPERLIPIDEMIA: ICD-10-CM

## 2019-09-12 DIAGNOSIS — M25.472 ANKLE EDEMA, BILATERAL: ICD-10-CM

## 2019-09-12 PROCEDURE — 99213 OFFICE O/P EST LOW 20 MIN: CPT | Performed by: FAMILY MEDICINE

## 2019-09-12 PROCEDURE — G0008 ADMIN INFLUENZA VIRUS VAC: HCPCS

## 2019-09-12 PROCEDURE — 90662 IIV NO PRSV INCREASED AG IM: CPT

## 2019-09-12 NOTE — ASSESSMENT & PLAN NOTE
Bilateral lower extremity edema from dependent edema he has no symptoms or signs of worsening shortness of breath or any symptoms of heart failure he lost about 5-6 lb since his last visit he remains active and is doing well overall no change in regimen avoid sodium in his diet

## 2019-09-12 NOTE — PROGRESS NOTES
Assessment/Plan:       Problem List Items Addressed This Visit        Digestive    Gastroesophageal reflux disease without esophagitis     GERD symptoms under good control with Prilosec 20 mg continue medication without change in follow-up in 3 months            Cardiovascular and Mediastinum    Essential hypertension - Primary     Essential hypertension doing well with current medication at this time no change in his plan he will continue with the lisinopril hydrochlorothiazide all lab work reviewed and I will follow up with him in 3 months            Other    Mixed hyperlipidemia     Mixed hyperlipidemia continue with Crestor 5 mg and follow up in 3 months no laboratory work at next visit will repeated in 6 months         Ankle edema, bilateral     Bilateral lower extremity edema from dependent edema he has no symptoms or signs of worsening shortness of breath or any symptoms of heart failure he lost about 5-6 lb since his last visit he remains active and is doing well overall no change in regimen avoid sodium in his diet                 Subjective:      Patient ID: Pierre Cheung is a 80 y o  male  Patient presents today for general checkup three-month evaluation he is doing relatively well overall without any new complaints today he is here for review of his laboratory work and a flu vaccine today      The following portions of the patient's history were reviewed and updated as appropriate: allergies, current medications, past family history, past medical history, past social history, past surgical history and problem list     Review of Systems   Constitutional: Negative for chills, fatigue and fever  HENT: Negative for congestion, nosebleeds, rhinorrhea, sinus pressure and sore throat  Eyes: Negative for discharge and redness  Respiratory: Negative for cough and shortness of breath  Cardiovascular: Negative for chest pain, palpitations and leg swelling     Gastrointestinal: Negative for abdominal pain, blood in stool and nausea  Endocrine: Negative for cold intolerance, heat intolerance and polyuria  Genitourinary: Negative for dysuria and frequency  Musculoskeletal: Negative for arthralgias, back pain and myalgias  Skin: Negative for rash  Neurological: Negative for dizziness, weakness and headaches  Hematological: Negative for adenopathy  Psychiatric/Behavioral: Negative for behavioral problems and sleep disturbance  The patient is not nervous/anxious  Objective:      /62 (BP Location: Left arm, Patient Position: Sitting)   Pulse 69   Temp 99 5 °F (37 5 °C) (Tympanic)   Ht 5' 3" (1 6 m)   Wt 77 7 kg (171 lb 6 4 oz)   SpO2 96%   BMI 30 36 kg/m²        Physical Exam   Constitutional: He is oriented to person, place, and time  He appears well-developed and well-nourished  HENT:   Head: Normocephalic and atraumatic  Right Ear: External ear normal    Left Ear: External ear normal    Nose: Nose normal    Mouth/Throat: Oropharynx is clear and moist    Eyes: Pupils are equal, round, and reactive to light  Conjunctivae and EOM are normal  No scleral icterus  Neck: Normal range of motion  Neck supple  No JVD present  No thyromegaly present  Cardiovascular: Normal rate, regular rhythm and normal heart sounds  No murmur heard  Pulmonary/Chest: Effort normal and breath sounds normal  He has no wheezes  He has no rales  He exhibits no tenderness  Abdominal: Soft  Bowel sounds are normal  He exhibits no distension and no mass  There is no tenderness  There is no rebound and no guarding  Musculoskeletal: Normal range of motion  He exhibits no edema, tenderness or deformity  Lymphadenopathy:     He has no cervical adenopathy  Neurological: He is alert and oriented to person, place, and time  He has normal reflexes  He displays normal reflexes  No cranial nerve deficit  Skin: Skin is warm and dry  No rash noted  No erythema     Psychiatric: He has a normal mood and [FreeTextEntry1] : patient presents for 3 month follow up\par  affect  His behavior is normal  Judgment and thought content normal    Nursing note and vitals reviewed  Data:    Laboratory Results: I have personally reviewed the pertinent laboratory results/reports   Radiology/Other Diagnostic Testing Results: I have personally reviewed pertinent reports         Lab Results   Component Value Date    WBC 6 67 06/10/2019    HGB 14 1 06/10/2019    HCT 43 9 06/10/2019    MCV 96 06/10/2019     06/10/2019     Lab Results   Component Value Date    K 3 9 06/10/2019     06/10/2019    CO2 33 (H) 06/10/2019    BUN 16 06/10/2019    CREATININE 1 03 06/10/2019    GLUF 82 06/10/2019    CALCIUM 9 0 06/10/2019    AST 14 06/10/2019    ALT 19 06/10/2019    ALKPHOS 65 06/10/2019    EGFR 63 06/10/2019     Lab Results   Component Value Date    CHOLESTEROL 155 06/10/2019    CHOLESTEROL 153 09/07/2018     Lab Results   Component Value Date    HDL 53 06/10/2019    HDL 52 09/07/2018     Lab Results   Component Value Date    LDLCALC 83 06/10/2019    LDLCALC 85 09/07/2018     Lab Results   Component Value Date    TRIG 93 06/10/2019    TRIG 82 09/07/2018     No results found for: CHOLHDL  No results found for: [id number], TSH  No results found for: HGBA1C  Lab Results   Component Value Date    PSA 4 3 (H) 06/10/2019       Pearl Granados DO [de-identified] : She is feeling well and denies any health changes.

## 2019-09-12 NOTE — ASSESSMENT & PLAN NOTE
GERD symptoms under good control with Prilosec 20 mg continue medication without change in follow-up in 3 months

## 2019-09-12 NOTE — ASSESSMENT & PLAN NOTE
Mixed hyperlipidemia continue with Crestor 5 mg and follow up in 3 months no laboratory work at next visit will repeated in 6 months

## 2019-09-12 NOTE — ASSESSMENT & PLAN NOTE
Essential hypertension doing well with current medication at this time no change in his plan he will continue with the lisinopril hydrochlorothiazide all lab work reviewed and I will follow up with him in 3 months

## 2019-10-07 DIAGNOSIS — I10 ESSENTIAL HYPERTENSION: ICD-10-CM

## 2019-10-07 DIAGNOSIS — K21.9 GASTROESOPHAGEAL REFLUX DISEASE WITHOUT ESOPHAGITIS: ICD-10-CM

## 2019-10-07 DIAGNOSIS — E78.2 MIXED HYPERLIPIDEMIA: ICD-10-CM

## 2019-10-07 RX ORDER — OMEPRAZOLE 20 MG/1
20 CAPSULE, DELAYED RELEASE ORAL DAILY
Qty: 90 CAPSULE | Refills: 2 | Status: SHIPPED | OUTPATIENT
Start: 2019-10-07 | End: 2020-03-25 | Stop reason: SDUPTHER

## 2019-10-07 RX ORDER — LISINOPRIL AND HYDROCHLOROTHIAZIDE 12.5; 1 MG/1; MG/1
1 TABLET ORAL DAILY
Qty: 90 TABLET | Refills: 2 | Status: SHIPPED | OUTPATIENT
Start: 2019-10-07 | End: 2020-03-25 | Stop reason: SDUPTHER

## 2019-10-07 RX ORDER — ROSUVASTATIN CALCIUM 5 MG/1
5 TABLET, COATED ORAL DAILY
Qty: 90 TABLET | Refills: 2 | Status: SHIPPED | OUTPATIENT
Start: 2019-10-07 | End: 2020-03-25 | Stop reason: SDUPTHER

## 2019-12-18 ENCOUNTER — OFFICE VISIT (OUTPATIENT)
Dept: FAMILY MEDICINE CLINIC | Facility: CLINIC | Age: 84
End: 2019-12-18
Payer: MEDICARE

## 2019-12-18 VITALS
BODY MASS INDEX: 31.11 KG/M2 | DIASTOLIC BLOOD PRESSURE: 70 MMHG | HEART RATE: 60 BPM | WEIGHT: 175.6 LBS | SYSTOLIC BLOOD PRESSURE: 132 MMHG | HEIGHT: 63 IN | OXYGEN SATURATION: 97 %

## 2019-12-18 DIAGNOSIS — K21.9 GASTROESOPHAGEAL REFLUX DISEASE WITHOUT ESOPHAGITIS: ICD-10-CM

## 2019-12-18 DIAGNOSIS — M25.471 ANKLE EDEMA, BILATERAL: ICD-10-CM

## 2019-12-18 DIAGNOSIS — E78.2 MIXED HYPERLIPIDEMIA: ICD-10-CM

## 2019-12-18 DIAGNOSIS — R97.20 ELEVATED PSA: ICD-10-CM

## 2019-12-18 DIAGNOSIS — I10 ESSENTIAL HYPERTENSION: Primary | ICD-10-CM

## 2019-12-18 DIAGNOSIS — M25.472 ANKLE EDEMA, BILATERAL: ICD-10-CM

## 2019-12-18 PROCEDURE — 99214 OFFICE O/P EST MOD 30 MIN: CPT | Performed by: FAMILY MEDICINE

## 2019-12-18 NOTE — ASSESSMENT & PLAN NOTE
Elevated PSA secondary to benign prostatic hypertrophy patient does not have any new symptomatology of nocturia or worsening urinary flow continue with current regimen at this time follow-up with PSA as indicated

## 2019-12-18 NOTE — PROGRESS NOTES
Assessment/Plan:       Problem List Items Addressed This Visit        Digestive    Gastroesophageal reflux disease without esophagitis     Gastroesophageal reflux under good control with omeprazole continue current medication follow-up in 3 months            Cardiovascular and Mediastinum    Essential hypertension - Primary     Hypertension under good control at this time continue with current medication no additional change follow-up at next office visit         Relevant Orders    CBC and differential    Comprehensive metabolic panel    Lipid panel    TSH, 3rd generation with Free T4 reflex       Other    Mixed hyperlipidemia     Mixed hyperlipidemia continue with Crestor same dosage in follow-up at next office visit in 3 months         Relevant Orders    CBC and differential    Comprehensive metabolic panel    Lipid panel    Elevated PSA     Elevated PSA secondary to benign prostatic hypertrophy patient does not have any new symptomatology of nocturia or worsening urinary flow continue with current regimen at this time follow-up with PSA as indicated         Relevant Orders    PSA, total and free    Ankle edema, bilateral     Bilateral dependent edema of bilateral ankles patient is euvolemic no shortness of breath or other indication of higher heart failure he is stable and I will continue with his current medication regimen maintain good blood pressure avoid sodium he will use topical cream for the dryness of his skin at the ankles otherwise follow-up in 3 months                 Subjective:      Patient ID: Ayesha Melgoza is a 80 y o  male      Patient comes in for general checkup and he complains of mild low back pain dryness at his ankles and swelling but has been doing very well overall with no acute problems at this point he has been independent and taking his medications without problems      The following portions of the patient's history were reviewed and updated as appropriate: allergies, current medications, past family history, past medical history, past social history, past surgical history and problem list     Review of Systems   Constitutional: Negative for chills, fatigue and fever  HENT: Negative for congestion, nosebleeds, rhinorrhea, sinus pressure and sore throat  Eyes: Negative for discharge and redness  Respiratory: Negative for cough and shortness of breath  Cardiovascular: Positive for leg swelling  Negative for chest pain and palpitations  Gastrointestinal: Negative for abdominal pain, blood in stool and nausea  Endocrine: Negative for cold intolerance, heat intolerance and polyuria  Genitourinary: Negative for dysuria and frequency  Musculoskeletal: Positive for back pain  Negative for arthralgias and myalgias  Skin: Negative for rash  Neurological: Negative for dizziness, weakness and headaches  Hematological: Negative for adenopathy  Psychiatric/Behavioral: Negative for behavioral problems and sleep disturbance  The patient is not nervous/anxious  Objective:      /70   Pulse 60   Ht 5' 3" (1 6 m)   Wt 79 7 kg (175 lb 9 6 oz)   SpO2 97%   BMI 31 11 kg/m²        Physical Exam   Constitutional: He is oriented to person, place, and time  He appears well-developed and well-nourished  HENT:   Head: Normocephalic and atraumatic  Right Ear: External ear normal    Left Ear: External ear normal    Nose: Nose normal    Mouth/Throat: Oropharynx is clear and moist    Eyes: Pupils are equal, round, and reactive to light  Conjunctivae and EOM are normal  No scleral icterus  Neck: Normal range of motion  Neck supple  No JVD present  No thyromegaly present  Cardiovascular: Normal rate, regular rhythm and normal heart sounds  No murmur heard  Pulmonary/Chest: Effort normal and breath sounds normal  He has no wheezes  He has no rales  He exhibits no tenderness  Abdominal: Soft  Bowel sounds are normal  He exhibits no distension and no mass   There is no tenderness  There is no rebound and no guarding  Musculoskeletal: Normal range of motion  He exhibits no edema, tenderness or deformity  Lymphadenopathy:     He has no cervical adenopathy  Neurological: He is alert and oriented to person, place, and time  He has normal reflexes  He displays normal reflexes  No cranial nerve deficit  Skin: Skin is warm and dry  No rash noted  No erythema  Psychiatric: He has a normal mood and affect  His behavior is normal  Judgment and thought content normal    Nursing note and vitals reviewed  Data:    Laboratory Results: I have personally reviewed the pertinent laboratory results/reports   Radiology/Other Diagnostic Testing Results: I have personally reviewed pertinent reports         Lab Results   Component Value Date    WBC 6 67 06/10/2019    HGB 14 1 06/10/2019    HCT 43 9 06/10/2019    MCV 96 06/10/2019     06/10/2019     Lab Results   Component Value Date    K 3 9 06/10/2019     06/10/2019    CO2 33 (H) 06/10/2019    BUN 16 06/10/2019    CREATININE 1 03 06/10/2019    GLUF 82 06/10/2019    CALCIUM 9 0 06/10/2019    AST 14 06/10/2019    ALT 19 06/10/2019    ALKPHOS 65 06/10/2019    EGFR 63 06/10/2019     Lab Results   Component Value Date    CHOLESTEROL 155 06/10/2019    CHOLESTEROL 153 09/07/2018     Lab Results   Component Value Date    HDL 53 06/10/2019    HDL 52 09/07/2018     Lab Results   Component Value Date    LDLCALC 83 06/10/2019    LDLCALC 85 09/07/2018     Lab Results   Component Value Date    TRIG 93 06/10/2019    TRIG 82 09/07/2018     No results found for: CHOLHDL  No results found for: [id number], TSH  No results found for: HGBA1C  Lab Results   Component Value Date    PSA 4 3 (H) 06/10/2019       Hazel Stevenson DO

## 2019-12-18 NOTE — ASSESSMENT & PLAN NOTE
Hypertension under good control at this time continue with current medication no additional change follow-up at next office visit

## 2019-12-18 NOTE — ASSESSMENT & PLAN NOTE
Bilateral dependent edema of bilateral ankles patient is euvolemic no shortness of breath or other indication of higher heart failure he is stable and I will continue with his current medication regimen maintain good blood pressure avoid sodium he will use topical cream for the dryness of his skin at the ankles otherwise follow-up in 3 months

## 2019-12-18 NOTE — ASSESSMENT & PLAN NOTE
Gastroesophageal reflux under good control with omeprazole continue current medication follow-up in 3 months

## 2019-12-18 NOTE — ASSESSMENT & PLAN NOTE
Mixed hyperlipidemia continue with Crestor same dosage in follow-up at next office visit in 3 months

## 2019-12-23 ENCOUNTER — APPOINTMENT (OUTPATIENT)
Dept: RADIOLOGY | Facility: CLINIC | Age: 84
End: 2019-12-23
Payer: MEDICARE

## 2019-12-23 ENCOUNTER — OFFICE VISIT (OUTPATIENT)
Dept: FAMILY MEDICINE CLINIC | Facility: CLINIC | Age: 84
End: 2019-12-23
Payer: MEDICARE

## 2019-12-23 VITALS
OXYGEN SATURATION: 97 % | SYSTOLIC BLOOD PRESSURE: 120 MMHG | HEART RATE: 76 BPM | HEIGHT: 63 IN | WEIGHT: 175.6 LBS | BODY MASS INDEX: 31.11 KG/M2 | DIASTOLIC BLOOD PRESSURE: 74 MMHG

## 2019-12-23 DIAGNOSIS — M19.011 ARTHRITIS OF RIGHT SHOULDER REGION: Primary | ICD-10-CM

## 2019-12-23 DIAGNOSIS — M19.011 ARTHRITIS OF RIGHT SHOULDER REGION: ICD-10-CM

## 2019-12-23 PROCEDURE — 73030 X-RAY EXAM OF SHOULDER: CPT

## 2019-12-23 PROCEDURE — 99213 OFFICE O/P EST LOW 20 MIN: CPT | Performed by: NURSE PRACTITIONER

## 2019-12-23 RX ORDER — MELOXICAM 15 MG/1
15 TABLET ORAL DAILY
Qty: 30 TABLET | Refills: 0 | Status: SHIPPED | OUTPATIENT
Start: 2019-12-23 | End: 2020-06-10 | Stop reason: SDUPTHER

## 2019-12-23 NOTE — PROGRESS NOTES
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     Wants to hold on PT  Warm compress  Gentle ROM at home  Obtain xray, mobic prn  Relevant Medications    meloxicam (MOBIC) 15 mg tablet    Other Relevant Orders    XR shoulder 2+ vw right            Reason For Visit / Chief Complaint  Chief Complaint   Patient presents with    Arm Pain     pt is in office today c/o right arm pain pt states that it hurts to lift his arm he could not do his routine exercises this morning  HPI:  Elías Henry is a 80 y o  male who presents today for acute right arm pain  He reports pain started in his right upper arm yesterday  He reports unable to lift arm up above his shoulder  He has limited ROM  He did not exercise today  The pain is when lifting the arm     Historical Information   Past Medical History:   Diagnosis Date    Elevated cholesterol     GERD (gastroesophageal reflux disease)     Hypertension     Sleep apnea      Past Surgical History:   Procedure Laterality Date    APPENDECTOMY       Social History   Social History     Substance and Sexual Activity   Alcohol Use Yes    Comment: glass of wine daily     Social History     Substance and Sexual Activity   Drug Use No     Social History     Tobacco Use   Smoking Status Former Smoker   Smokeless Tobacco Never Used     Family History   Problem Relation Age of Onset    No Known Problems Mother     No Known Problems Father        Meds/Allergies   No Known Allergies    Meds:    Current Outpatient Medications:     lisinopril-hydrochlorothiazide (PRINZIDE,ZESTORETIC) 10-12 5 MG per tablet, Take 1 tablet by mouth daily, Disp: 90 tablet, Rfl: 2    omeprazole (PriLOSEC) 20 mg delayed release capsule, Take 1 capsule (20 mg total) by mouth daily, Disp: 90 capsule, Rfl: 2    rosuvastatin (CRESTOR) 5 mg tablet, Take 1 tablet (5 mg total) by mouth daily, Disp: 90 tablet, Rfl: 2    meloxicam (MOBIC) 15 mg tablet, Take 1 tablet (15 mg total) by mouth daily, Disp: 30 tablet, Rfl: 0      REVIEW OF SYSTEMS  Review of Systems   Constitutional: Negative for activity change, chills, fatigue and fever  HENT: Negative for congestion, ear discharge, ear pain, sinus pressure, sinus pain, sore throat, tinnitus and trouble swallowing  Eyes: Negative for photophobia, pain, discharge, itching and visual disturbance  Respiratory: Negative for cough, chest tightness, shortness of breath and wheezing  Cardiovascular: Negative for chest pain and leg swelling  Gastrointestinal: Negative for abdominal distention, abdominal pain, constipation, diarrhea, nausea and vomiting  Endocrine: Negative for polydipsia, polyphagia and polyuria  Genitourinary: Negative for dysuria and frequency  Musculoskeletal: Positive for arthralgias  Negative for myalgias, neck pain and neck stiffness  Skin: Negative for color change  Neurological: Negative for dizziness, syncope, weakness, numbness and headaches  Hematological: Does not bruise/bleed easily  Psychiatric/Behavioral: Negative for behavioral problems, confusion, self-injury, sleep disturbance and suicidal ideas  The patient is not nervous/anxious  Current Vitals:   Blood Pressure: 120/74 (12/23/19 0951)  Pulse: 76 (12/23/19 0951)  Height: 5' 3" (160 cm) (12/23/19 0951)  Weight - Scale: 79 7 kg (175 lb 9 6 oz) (12/23/19 0951)  SpO2: 97 % (12/23/19 0951)  [unfilled]    PHYSICAL EXAMS:  Physical Exam   Constitutional: He is oriented to person, place, and time  He appears well-developed and well-nourished  HENT:   Head: Normocephalic and atraumatic  Right Ear: External ear normal    Left Ear: External ear normal    Nose: Nose normal    Mouth/Throat: Oropharynx is clear and moist    Eyes: Pupils are equal, round, and reactive to light  Conjunctivae are normal  Right eye exhibits no discharge  Left eye exhibits no discharge  Neck: Normal range of motion  Neck supple  No thyromegaly present  Cardiovascular: Normal rate, regular rhythm and normal heart sounds  Pulmonary/Chest: Effort normal and breath sounds normal    Abdominal: Soft  Bowel sounds are normal  He exhibits no distension  There is no tenderness  Musculoskeletal: He exhibits no edema, tenderness or deformity  Right shoulder: He exhibits decreased range of motion and crepitus  Neurological: He is alert and oriented to person, place, and time  Skin: Skin is warm and dry  No rash noted  No erythema  Psychiatric: He has a normal mood and affect  His behavior is normal            Lab, imaging and other studies: I have personally reviewed pertinent reports  Kelsie Blanco

## 2019-12-24 ENCOUNTER — TELEPHONE (OUTPATIENT)
Dept: FAMILY MEDICINE CLINIC | Facility: CLINIC | Age: 84
End: 2019-12-24

## 2020-01-05 ENCOUNTER — APPOINTMENT (OUTPATIENT)
Dept: RADIOLOGY | Facility: CLINIC | Age: 85
End: 2020-01-05
Payer: MEDICARE

## 2020-01-05 ENCOUNTER — OFFICE VISIT (OUTPATIENT)
Dept: URGENT CARE | Facility: CLINIC | Age: 85
End: 2020-01-05
Payer: MEDICARE

## 2020-01-05 VITALS
SYSTOLIC BLOOD PRESSURE: 110 MMHG | OXYGEN SATURATION: 97 % | WEIGHT: 175.8 LBS | BODY MASS INDEX: 31.15 KG/M2 | HEIGHT: 63 IN | RESPIRATION RATE: 20 BRPM | DIASTOLIC BLOOD PRESSURE: 62 MMHG | HEART RATE: 65 BPM | TEMPERATURE: 98.4 F

## 2020-01-05 DIAGNOSIS — M25.562 ACUTE PAIN OF LEFT KNEE: ICD-10-CM

## 2020-01-05 DIAGNOSIS — M25.562 ACUTE PAIN OF LEFT KNEE: Primary | ICD-10-CM

## 2020-01-05 PROCEDURE — 99203 OFFICE O/P NEW LOW 30 MIN: CPT | Performed by: PHYSICIAN ASSISTANT

## 2020-01-05 PROCEDURE — G0463 HOSPITAL OUTPT CLINIC VISIT: HCPCS | Performed by: PHYSICIAN ASSISTANT

## 2020-01-05 PROCEDURE — 73562 X-RAY EXAM OF KNEE 3: CPT

## 2020-01-05 NOTE — PATIENT INSTRUCTIONS
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

## 2020-01-05 NOTE — PROGRESS NOTES
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  -Call tomorrow for radiology report: [phone]  -Do RICE protocol at home (rest, ice, compression, elevate)  -Wear ace/aircast and weight bear as tolerated  -Use tylenol/motrin as needed  -Follow-up with PCP and/or Orthopedics within 1 week    Go to ER with worsening symptoms, worsening pain, or any signs of distress     Follow up with PCP in 3-5 days  Proceed to  ER if symptoms worsen  Chief Complaint     Chief Complaint   Patient presents with    Knee Pain     Outer L knee pain started this morning when he got out of bed  denies any injury  History of Present Illness       Patient is a 77-year-old male who presents today for evaluation of left knee pain  Patient states that he started having pain on the outside of his left knee this morning when he got out of bed  Patient states that his pain as a 10/10 with touching the area and it gets worse when he walks  No injury or trauma  No fevers or chills  Review of Systems   Review of Systems   Constitutional: Negative for chills and fever  Respiratory: Negative for shortness of breath  Cardiovascular: Negative for chest pain  Musculoskeletal: Positive for arthralgias  Skin: Negative for rash  Neurological: Negative for weakness and numbness  All other systems reviewed and are negative          Current Medications       Current Outpatient Medications:     lisinopril-hydrochlorothiazide (PRINZIDE,ZESTORETIC) 10-12 5 MG per tablet, Take 1 tablet by mouth daily, Disp: 90 tablet, Rfl: 2    meloxicam (MOBIC) 15 mg tablet, Take 1 tablet (15 mg total) by mouth daily, Disp: 30 tablet, Rfl: 0    omeprazole (PriLOSEC) 20 mg delayed release capsule, Take 1 capsule (20 mg total) by mouth daily, Disp: 90 capsule, Rfl: 2    rosuvastatin (CRESTOR) 5 mg tablet, Take 1 tablet (5 mg total) by mouth daily, Disp: 90 tablet, Rfl: 2    Current Allergies     Allergies as of 01/05/2020    (No Known Allergies)            The following portions of the patient's history were reviewed and updated as appropriate: allergies, current medications, past family history, past medical history, past social history, past surgical history and problem list      Past Medical History:   Diagnosis Date    Elevated cholesterol     GERD (gastroesophageal reflux disease)     Hypertension     Sleep apnea        Past Surgical History:   Procedure Laterality Date    APPENDECTOMY         Family History   Problem Relation Age of Onset    No Known Problems Mother     No Known Problems Father          Medications have been verified  Objective   /62 (BP Location: Left arm, Patient Position: Sitting)   Pulse 65   Temp 98 4 °F (36 9 °C) (Temporal)   Resp 20   Ht 5' 3" (1 6 m)   Wt 79 7 kg (175 lb 12 8 oz)   SpO2 97%   BMI 31 14 kg/m²        Physical Exam     Physical Exam   Constitutional: He is oriented to person, place, and time  He appears well-developed and well-nourished  No distress  Cardiovascular: Normal rate, regular rhythm and normal heart sounds  Pulmonary/Chest: Effort normal and breath sounds normal    Musculoskeletal:        Left knee: He exhibits no swelling and no effusion  Tenderness found  Lateral joint line tenderness noted  Neurological: He is alert and oriented to person, place, and time  Skin: Skin is warm and dry  Psychiatric: He has a normal mood and affect  Nursing note and vitals reviewed

## 2020-03-12 ENCOUNTER — APPOINTMENT (OUTPATIENT)
Dept: LAB | Facility: CLINIC | Age: 85
End: 2020-03-12
Payer: MEDICARE

## 2020-03-12 DIAGNOSIS — R97.20 ELEVATED PSA: ICD-10-CM

## 2020-03-12 DIAGNOSIS — I10 ESSENTIAL HYPERTENSION: ICD-10-CM

## 2020-03-12 DIAGNOSIS — E78.2 MIXED HYPERLIPIDEMIA: ICD-10-CM

## 2020-03-12 LAB
ALBUMIN SERPL BCP-MCNC: 3.7 G/DL (ref 3.5–5)
ALP SERPL-CCNC: 67 U/L (ref 46–116)
ALT SERPL W P-5'-P-CCNC: 20 U/L (ref 12–78)
ANION GAP SERPL CALCULATED.3IONS-SCNC: 5 MMOL/L (ref 4–13)
AST SERPL W P-5'-P-CCNC: 15 U/L (ref 5–45)
BASOPHILS # BLD AUTO: 0.03 THOUSANDS/ΜL (ref 0–0.1)
BASOPHILS NFR BLD AUTO: 0 % (ref 0–1)
BILIRUB SERPL-MCNC: 0.65 MG/DL (ref 0.2–1)
BUN SERPL-MCNC: 18 MG/DL (ref 5–25)
CALCIUM SERPL-MCNC: 9 MG/DL (ref 8.3–10.1)
CHLORIDE SERPL-SCNC: 104 MMOL/L (ref 100–108)
CHOLEST SERPL-MCNC: 173 MG/DL (ref 50–200)
CO2 SERPL-SCNC: 33 MMOL/L (ref 21–32)
CREAT SERPL-MCNC: 1.09 MG/DL (ref 0.6–1.3)
EOSINOPHIL # BLD AUTO: 0.49 THOUSAND/ΜL (ref 0–0.61)
EOSINOPHIL NFR BLD AUTO: 7 % (ref 0–6)
ERYTHROCYTE [DISTWIDTH] IN BLOOD BY AUTOMATED COUNT: 13.8 % (ref 11.6–15.1)
GFR SERPL CREATININE-BSD FRML MDRD: 58 ML/MIN/1.73SQ M
GLUCOSE P FAST SERPL-MCNC: 89 MG/DL (ref 65–99)
HCT VFR BLD AUTO: 45.7 % (ref 36.5–49.3)
HDLC SERPL-MCNC: 52 MG/DL
HGB BLD-MCNC: 14.5 G/DL (ref 12–17)
IMM GRANULOCYTES # BLD AUTO: 0.02 THOUSAND/UL (ref 0–0.2)
IMM GRANULOCYTES NFR BLD AUTO: 0 % (ref 0–2)
LDLC SERPL CALC-MCNC: 103 MG/DL (ref 0–100)
LYMPHOCYTES # BLD AUTO: 1.79 THOUSANDS/ΜL (ref 0.6–4.47)
LYMPHOCYTES NFR BLD AUTO: 24 % (ref 14–44)
MCH RBC QN AUTO: 30.6 PG (ref 26.8–34.3)
MCHC RBC AUTO-ENTMCNC: 31.7 G/DL (ref 31.4–37.4)
MCV RBC AUTO: 96 FL (ref 82–98)
MONOCYTES # BLD AUTO: 0.86 THOUSAND/ΜL (ref 0.17–1.22)
MONOCYTES NFR BLD AUTO: 12 % (ref 4–12)
NEUTROPHILS # BLD AUTO: 4.23 THOUSANDS/ΜL (ref 1.85–7.62)
NEUTS SEG NFR BLD AUTO: 57 % (ref 43–75)
NONHDLC SERPL-MCNC: 121 MG/DL
NRBC BLD AUTO-RTO: 0 /100 WBCS
PLATELET # BLD AUTO: 189 THOUSANDS/UL (ref 149–390)
PMV BLD AUTO: 12.1 FL (ref 8.9–12.7)
POTASSIUM SERPL-SCNC: 4.3 MMOL/L (ref 3.5–5.3)
PROT SERPL-MCNC: 7.1 G/DL (ref 6.4–8.2)
RBC # BLD AUTO: 4.74 MILLION/UL (ref 3.88–5.62)
SODIUM SERPL-SCNC: 142 MMOL/L (ref 136–145)
TRIGL SERPL-MCNC: 88 MG/DL
TSH SERPL DL<=0.05 MIU/L-ACNC: 1.01 UIU/ML (ref 0.36–3.74)
WBC # BLD AUTO: 7.42 THOUSAND/UL (ref 4.31–10.16)

## 2020-03-12 PROCEDURE — 80061 LIPID PANEL: CPT

## 2020-03-12 PROCEDURE — 84154 ASSAY OF PSA FREE: CPT

## 2020-03-12 PROCEDURE — 84153 ASSAY OF PSA TOTAL: CPT

## 2020-03-12 PROCEDURE — 85025 COMPLETE CBC W/AUTO DIFF WBC: CPT

## 2020-03-12 PROCEDURE — 80053 COMPREHEN METABOLIC PANEL: CPT

## 2020-03-12 PROCEDURE — 84443 ASSAY THYROID STIM HORMONE: CPT

## 2020-03-12 PROCEDURE — 36415 COLL VENOUS BLD VENIPUNCTURE: CPT

## 2020-03-17 ENCOUNTER — OFFICE VISIT (OUTPATIENT)
Dept: FAMILY MEDICINE CLINIC | Facility: CLINIC | Age: 85
End: 2020-03-17
Payer: MEDICARE

## 2020-03-17 VITALS
HEART RATE: 65 BPM | DIASTOLIC BLOOD PRESSURE: 78 MMHG | WEIGHT: 177.2 LBS | BODY MASS INDEX: 31.4 KG/M2 | OXYGEN SATURATION: 95 % | SYSTOLIC BLOOD PRESSURE: 120 MMHG | HEIGHT: 63 IN | TEMPERATURE: 98.5 F

## 2020-03-17 DIAGNOSIS — R97.20 ELEVATED PSA: ICD-10-CM

## 2020-03-17 DIAGNOSIS — I10 ESSENTIAL HYPERTENSION: ICD-10-CM

## 2020-03-17 DIAGNOSIS — K21.9 GASTROESOPHAGEAL REFLUX DISEASE WITHOUT ESOPHAGITIS: Primary | ICD-10-CM

## 2020-03-17 DIAGNOSIS — E78.2 MIXED HYPERLIPIDEMIA: ICD-10-CM

## 2020-03-17 LAB
PSA FREE MFR SERPL: 31.5 %
PSA FREE SERPL-MCNC: 1.73 NG/ML
PSA SERPL-MCNC: 5.5 NG/ML (ref 0–4)

## 2020-03-17 PROCEDURE — 99214 OFFICE O/P EST MOD 30 MIN: CPT | Performed by: FAMILY MEDICINE

## 2020-03-17 PROCEDURE — 1036F TOBACCO NON-USER: CPT | Performed by: FAMILY MEDICINE

## 2020-03-17 PROCEDURE — 3078F DIAST BP <80 MM HG: CPT | Performed by: FAMILY MEDICINE

## 2020-03-17 PROCEDURE — 1160F RVW MEDS BY RX/DR IN RCRD: CPT | Performed by: FAMILY MEDICINE

## 2020-03-17 PROCEDURE — 3074F SYST BP LT 130 MM HG: CPT | Performed by: FAMILY MEDICINE

## 2020-03-17 PROCEDURE — 3008F BODY MASS INDEX DOCD: CPT | Performed by: FAMILY MEDICINE

## 2020-03-17 NOTE — PATIENT INSTRUCTIONS
Heart Healthy Diet   WHAT YOU NEED TO KNOW:   A heart healthy diet is an eating plan low in total fat, unhealthy fats, and sodium (salt)  A heart healthy diet helps decrease your risk for heart disease and stroke  Limit the amount of fat you eat to 25% to 35% of your total daily calories  Limit sodium to less than 2,300 mg each day  DISCHARGE INSTRUCTIONS:   Healthy fats:  Healthy fats can help improve cholesterol levels  The risk for heart disease is decreased when cholesterol levels are normal  Choose healthy fats, such as the following:  · Unsaturated fat  is found in foods such as soybean, canola, olive, corn, and safflower oils  It is also found in soft tub margarine that is made with liquid vegetable oil  · Omega-3 fat  is found in certain fish, such as salmon, tuna, and trout, and in walnuts and flaxseed  Unhealthy fats:  Unhealthy fats can cause unhealthy cholesterol levels in your blood and increase your risk of heart disease  Limit unhealthy fats, such as the following:  · Cholesterol  is found in animal foods, such as eggs and lobster, and in dairy products made from whole milk  Limit cholesterol to less than 300 milligrams (mg) each day  You may need to limit cholesterol to 200 mg each day if you have heart disease  · Saturated fat  is found in meats, such as mendoza and hamburger  It is also found in chicken or turkey skin, whole milk, and butter  Limit saturated fat to less than 7% of your total daily calories  Limit saturated fat to less than 6% if you have heart disease or are at increased risk for it  · Trans fat  is found in packaged foods, such as potato chips and cookies  It is also in hard margarine, some fried foods, and shortening  Avoid trans fats as much as possible    Heart healthy foods and drinks to include:  Ask your dietitian or healthcare provider how many servings to have from each of the following food groups:  · Grains:      ¨ Whole-wheat breads, cereals, and pastas, and brown rice    ¨ Low-fat, low-sodium crackers and chips    · Vegetables:      ¨ Broccoli, green beans, green peas, and spinach    ¨ Collards, kale, and lima beans    ¨ Carrots, sweet potatoes, tomatoes, and peppers    ¨ Canned vegetables with no salt added    · Fruits:      ¨ Bananas, peaches, pears, and pineapple    ¨ Grapes, raisins, and dates    ¨ Oranges, tangerines, grapefruit, orange juice, and grapefruit juice    ¨ Apricots, mangoes, melons, and papaya    ¨ Raspberries and strawberries    ¨ Canned fruit with no added sugar    · Low-fat dairy products:      ¨ Nonfat (skim) milk, 1% milk, and low-fat almond, cashew, or soy milks fortified with calcium    ¨ Low-fat cheese, regular or frozen yogurt, and cottage cheese    · Meats and proteins , such as lean cuts of beef and pork (loin, leg, round), skinless chicken and turkey, legumes, soy products, egg whites, and nuts  Foods and drinks to limit or avoid:  Ask your dietitian or healthcare provider about these and other foods that are high in unhealthy fat, sodium, and sugar:  · Snack or packaged foods , such as frozen dinners, cookies, macaroni and cheese, and cereals with more than 300 mg of sodium per serving    · Canned or dry mixes  for cakes, soups, sauces, or gravies    · Vegetables with added sodium , such as instant potatoes, vegetables with added sauces, or regular canned vegetables    · Other foods high in sodium , such as ketchup, barbecue sauce, salad dressing, pickles, olives, soy sauce, and miso    · High-fat dairy foods  such as whole or 2% milk, cream cheese, or sour cream, and cheeses     · High-fat protein foods  such as high-fat cuts of beef (T-bone steaks, ribs), chicken or turkey with skin, and organ meats, such as liver    · Cured or smoked meats , such as hot dogs, mendoza, and sausage    · Unhealthy fats and oils , such as butter, stick margarine, shortening, and cooking oils such as coconut or palm oil    · Food and drinks high in sugar , such as soft drinks (soda), sports drinks, sweetened tea, candy, cake, cookies, pies, and doughnuts  Other diet guidelines to follow:   · Eat more foods containing omega-3 fats  Eat fish high in omega-3 fats at least 2 times a week  · Limit alcohol  Too much alcohol can damage your heart and raise your blood pressure  Women should limit alcohol to 1 drink a day  Men should limit alcohol to 2 drinks a day  A drink of alcohol is 12 ounces of beer, 5 ounces of wine, or 1½ ounces of liquor  · Choose low-sodium foods  High-sodium foods can lead to high blood pressure  Add little or no salt to food you prepare  Use herbs and spices in place of salt  · Eat more fiber  to help lower cholesterol levels  Eat at least 5 servings of fruits and vegetables each day  Eat 3 ounces of whole-grain foods each day  Legumes (beans) are also a good source of fiber  Lifestyle guidelines:   · Do not smoke  Nicotine and other chemicals in cigarettes and cigars can cause lung and heart damage  Ask your healthcare provider for information if you currently smoke and need help to quit  E-cigarettes or smokeless tobacco still contain nicotine  Talk to your healthcare provider before you use these products  · Exercise regularly  to help you maintain a healthy weight and improve your blood pressure and cholesterol levels  Ask your healthcare provider about the best exercise plan for you  Do not start an exercise program without asking your healthcare provider  Follow up with your healthcare provider as directed:  Write down your questions so you remember to ask them during your visits  © 2017 [employer] Information is for End User's use only and may not be sold, redistributed or otherwise used for commercial purposes  All illustrations and images included in CareNotes® are the copyrighted property of A [employer]  or [employer]  The above information is an  only   It is not intended as medical advice for individual conditions or treatments  Talk to your doctor, nurse or pharmacist before following any medical regimen to see if it is safe and effective for you

## 2020-03-17 NOTE — PROGRESS NOTES
Assessment/Plan:       Problem List Items Addressed This Visit        Digestive    Gastroesophageal reflux disease without esophagitis - Primary     GERD symptoms stable with Prilosec 20 mg continue this medication without change in follow-up at next office visit            Cardiovascular and Mediastinum    Essential hypertension     Essential hypertension stable at this time with lisinopril hydrochlorothiazide combination product he will continue this medication work on her heart healthy diet and follow up with me as scheduled            Other    Mixed hyperlipidemia     Mixed hyperlipidemia total cholesterol is at 173 now with HDL at 52 LDL is at 103 this number has increased slightly over the winter I would like to repeat the number at the next office visit and hope to see an improvement I will provide him with heart healthy diet information he will continue with Crestor 5 mg tablets         Relevant Orders    CBC and differential    Comprehensive metabolic panel    Lipid panel    Elevated PSA     Elevated PSA by history from BPH follow with levels periodically as scheduled will repeat PSA at next office visit         Relevant Orders    PSA, total and free            Subjective:      Patient ID: Gurmeet Mace is a 80 y o  male  Patient presents for general checkup arthritic complaints back pain blood pressure review medication review at this time doing overall relatively well he is not coughing has no fever he has been doing relatively well overall with no new acute changes or problems here to review his overall lab work and medications      The following portions of the patient's history were reviewed and updated as appropriate: allergies, current medications, past family history, past medical history, past social history, past surgical history and problem list     Review of Systems   Constitutional: Negative for chills, fatigue and fever     HENT: Negative for congestion, nosebleeds, rhinorrhea, sinus pressure and sore throat  Eyes: Negative for discharge and redness  Respiratory: Negative for cough and shortness of breath  Cardiovascular: Negative for chest pain, palpitations and leg swelling  Gastrointestinal: Negative for abdominal pain, blood in stool and nausea  Endocrine: Negative for cold intolerance, heat intolerance and polyuria  Genitourinary: Negative for dysuria and frequency  Musculoskeletal: Positive for arthralgias and back pain  Negative for myalgias  Skin: Negative for rash  Neurological: Negative for dizziness, weakness and headaches  Hematological: Negative for adenopathy  Psychiatric/Behavioral: Negative for behavioral problems and sleep disturbance  The patient is not nervous/anxious  Objective:      /78 (BP Location: Left arm, Patient Position: Sitting)   Pulse 65   Temp 98 5 °F (36 9 °C)   Ht 5' 3" (1 6 m)   Wt 80 4 kg (177 lb 3 2 oz)   SpO2 95%   BMI 31 39 kg/m²        Physical Exam   Constitutional: He is oriented to person, place, and time  He appears well-developed and well-nourished  HENT:   Head: Normocephalic and atraumatic  Right Ear: External ear normal    Left Ear: External ear normal    Nose: Nose normal    Mouth/Throat: Oropharynx is clear and moist    Eyes: Pupils are equal, round, and reactive to light  Conjunctivae and EOM are normal  No scleral icterus  Neck: Normal range of motion  Neck supple  No JVD present  No thyromegaly present  Cardiovascular: Normal rate, regular rhythm and normal heart sounds  No murmur heard  Pulmonary/Chest: Effort normal and breath sounds normal  He has no wheezes  He has no rales  He exhibits no tenderness  Abdominal: Soft  Bowel sounds are normal  He exhibits no distension and no mass  There is no tenderness  There is no rebound and no guarding  Musculoskeletal: Normal range of motion  He exhibits edema and tenderness  He exhibits no deformity     2+ pitting edema bilateral lower extremities worse on right lower extremity no change from previous exam   Lymphadenopathy:     He has no cervical adenopathy  Neurological: He is alert and oriented to person, place, and time  He has normal reflexes  He displays normal reflexes  No cranial nerve deficit  Skin: Skin is warm and dry  No rash noted  No erythema  Psychiatric: He has a normal mood and affect  His behavior is normal  Judgment and thought content normal    Nursing note and vitals reviewed  Data:    Laboratory Results: I have personally reviewed the pertinent laboratory results/reports   Radiology/Other Diagnostic Testing Results: I have personally reviewed pertinent reports         Lab Results   Component Value Date    WBC 7 42 03/12/2020    HGB 14 5 03/12/2020    HCT 45 7 03/12/2020    MCV 96 03/12/2020     03/12/2020     Lab Results   Component Value Date    K 4 3 03/12/2020     03/12/2020    CO2 33 (H) 03/12/2020    BUN 18 03/12/2020    CREATININE 1 09 03/12/2020    GLUF 89 03/12/2020    CALCIUM 9 0 03/12/2020    AST 15 03/12/2020    ALT 20 03/12/2020    ALKPHOS 67 03/12/2020    EGFR 58 03/12/2020     Lab Results   Component Value Date    CHOLESTEROL 173 03/12/2020    CHOLESTEROL 155 06/10/2019    CHOLESTEROL 153 09/07/2018     Lab Results   Component Value Date    HDL 52 03/12/2020    HDL 53 06/10/2019    HDL 52 09/07/2018     Lab Results   Component Value Date    LDLCALC 103 (H) 03/12/2020    LDLCALC 83 06/10/2019    LDLCALC 85 09/07/2018     Lab Results   Component Value Date    TRIG 88 03/12/2020    TRIG 93 06/10/2019    TRIG 82 09/07/2018     No results found for: [place], [place]  Lab Results   Component Value Date    [id number] 1 010 03/12/2020     No results found for: HGBA1C  Lab Results   Component Value Date    PSA 4 3 (H) 06/10/2019       Denise Shaw DO

## 2020-03-17 NOTE — ASSESSMENT & PLAN NOTE
GERD symptoms stable with Prilosec 20 mg continue this medication without change in follow-up at next office visit

## 2020-03-17 NOTE — ASSESSMENT & PLAN NOTE
Elevated PSA by history from BPH follow with levels periodically as scheduled will repeat PSA at next office visit

## 2020-03-17 NOTE — ASSESSMENT & PLAN NOTE
Essential hypertension stable at this time with lisinopril hydrochlorothiazide combination product he will continue this medication work on her heart healthy diet and follow up with me as scheduled

## 2020-03-17 NOTE — ASSESSMENT & PLAN NOTE
Mixed hyperlipidemia total cholesterol is at 173 now with HDL at 52 LDL is at 103 this number has increased slightly over the winter I would like to repeat the number at the next office visit and hope to see an improvement I will provide him with heart healthy diet information he will continue with Crestor 5 mg tablets

## 2020-03-25 DIAGNOSIS — E78.2 MIXED HYPERLIPIDEMIA: ICD-10-CM

## 2020-03-25 DIAGNOSIS — K21.9 GASTROESOPHAGEAL REFLUX DISEASE WITHOUT ESOPHAGITIS: ICD-10-CM

## 2020-03-25 DIAGNOSIS — M19.011 ARTHRITIS OF RIGHT SHOULDER REGION: ICD-10-CM

## 2020-03-25 DIAGNOSIS — I10 ESSENTIAL HYPERTENSION: ICD-10-CM

## 2020-03-25 RX ORDER — LISINOPRIL AND HYDROCHLOROTHIAZIDE 12.5; 1 MG/1; MG/1
1 TABLET ORAL DAILY
Qty: 90 TABLET | Refills: 2 | Status: SHIPPED | OUTPATIENT
Start: 2020-03-25 | End: 2020-06-10 | Stop reason: SDUPTHER

## 2020-03-25 RX ORDER — OMEPRAZOLE 20 MG/1
20 CAPSULE, DELAYED RELEASE ORAL DAILY
Qty: 90 CAPSULE | Refills: 2 | Status: SHIPPED | OUTPATIENT
Start: 2020-03-25 | End: 2020-06-10 | Stop reason: SDUPTHER

## 2020-03-25 RX ORDER — ROSUVASTATIN CALCIUM 5 MG/1
5 TABLET, COATED ORAL DAILY
Qty: 90 TABLET | Refills: 2 | Status: SHIPPED | OUTPATIENT
Start: 2020-03-25 | End: 2020-06-10 | Stop reason: SDUPTHER

## 2020-06-10 DIAGNOSIS — K21.9 GASTROESOPHAGEAL REFLUX DISEASE WITHOUT ESOPHAGITIS: ICD-10-CM

## 2020-06-10 DIAGNOSIS — E78.2 MIXED HYPERLIPIDEMIA: ICD-10-CM

## 2020-06-10 DIAGNOSIS — I10 ESSENTIAL HYPERTENSION: ICD-10-CM

## 2020-06-10 DIAGNOSIS — M19.011 ARTHRITIS OF RIGHT SHOULDER REGION: ICD-10-CM

## 2020-06-10 RX ORDER — LISINOPRIL AND HYDROCHLOROTHIAZIDE 12.5; 1 MG/1; MG/1
1 TABLET ORAL DAILY
Qty: 90 TABLET | Refills: 2 | Status: SHIPPED | OUTPATIENT
Start: 2020-06-10 | End: 2020-10-08 | Stop reason: SDUPTHER

## 2020-06-10 RX ORDER — OMEPRAZOLE 20 MG/1
20 CAPSULE, DELAYED RELEASE ORAL DAILY
Qty: 90 CAPSULE | Refills: 2 | Status: SHIPPED | OUTPATIENT
Start: 2020-06-10 | End: 2020-10-08 | Stop reason: SDUPTHER

## 2020-06-10 RX ORDER — MELOXICAM 15 MG/1
15 TABLET ORAL DAILY
Qty: 30 TABLET | Refills: 0 | Status: SHIPPED | OUTPATIENT
Start: 2020-06-10 | End: 2020-07-13 | Stop reason: SDUPTHER

## 2020-06-10 RX ORDER — ROSUVASTATIN CALCIUM 5 MG/1
5 TABLET, COATED ORAL DAILY
Qty: 90 TABLET | Refills: 2 | Status: SHIPPED | OUTPATIENT
Start: 2020-06-10 | End: 2020-10-08 | Stop reason: SDUPTHER

## 2020-06-17 ENCOUNTER — TELEPHONE (OUTPATIENT)
Dept: FAMILY MEDICINE CLINIC | Facility: CLINIC | Age: 85
End: 2020-06-17

## 2020-07-06 ENCOUNTER — APPOINTMENT (OUTPATIENT)
Dept: LAB | Facility: CLINIC | Age: 85
End: 2020-07-06
Payer: MEDICARE

## 2020-07-06 DIAGNOSIS — E78.2 MIXED HYPERLIPIDEMIA: ICD-10-CM

## 2020-07-06 DIAGNOSIS — R97.20 ELEVATED PSA: ICD-10-CM

## 2020-07-06 LAB
ALBUMIN SERPL BCP-MCNC: 3.7 G/DL (ref 3.5–5)
ALP SERPL-CCNC: 75 U/L (ref 46–116)
ALT SERPL W P-5'-P-CCNC: 14 U/L (ref 12–78)
ANION GAP SERPL CALCULATED.3IONS-SCNC: 7 MMOL/L (ref 4–13)
AST SERPL W P-5'-P-CCNC: 15 U/L (ref 5–45)
BASOPHILS # BLD AUTO: 0.03 THOUSANDS/ΜL (ref 0–0.1)
BASOPHILS NFR BLD AUTO: 0 % (ref 0–1)
BILIRUB SERPL-MCNC: 0.82 MG/DL (ref 0.2–1)
BUN SERPL-MCNC: 14 MG/DL (ref 5–25)
CALCIUM SERPL-MCNC: 9.1 MG/DL (ref 8.3–10.1)
CHLORIDE SERPL-SCNC: 101 MMOL/L (ref 100–108)
CHOLEST SERPL-MCNC: 164 MG/DL (ref 50–200)
CO2 SERPL-SCNC: 29 MMOL/L (ref 21–32)
CREAT SERPL-MCNC: 1.08 MG/DL (ref 0.6–1.3)
EOSINOPHIL # BLD AUTO: 0.45 THOUSAND/ΜL (ref 0–0.61)
EOSINOPHIL NFR BLD AUTO: 7 % (ref 0–6)
ERYTHROCYTE [DISTWIDTH] IN BLOOD BY AUTOMATED COUNT: 13.8 % (ref 11.6–15.1)
GFR SERPL CREATININE-BSD FRML MDRD: 59 ML/MIN/1.73SQ M
GLUCOSE P FAST SERPL-MCNC: 80 MG/DL (ref 65–99)
HCT VFR BLD AUTO: 43.9 % (ref 36.5–49.3)
HDLC SERPL-MCNC: 49 MG/DL
HGB BLD-MCNC: 13.7 G/DL (ref 12–17)
IMM GRANULOCYTES # BLD AUTO: 0.02 THOUSAND/UL (ref 0–0.2)
IMM GRANULOCYTES NFR BLD AUTO: 0 % (ref 0–2)
LDLC SERPL CALC-MCNC: 97 MG/DL (ref 0–100)
LYMPHOCYTES # BLD AUTO: 1.58 THOUSANDS/ΜL (ref 0.6–4.47)
LYMPHOCYTES NFR BLD AUTO: 23 % (ref 14–44)
MCH RBC QN AUTO: 29.8 PG (ref 26.8–34.3)
MCHC RBC AUTO-ENTMCNC: 31.2 G/DL (ref 31.4–37.4)
MCV RBC AUTO: 95 FL (ref 82–98)
MONOCYTES # BLD AUTO: 0.77 THOUSAND/ΜL (ref 0.17–1.22)
MONOCYTES NFR BLD AUTO: 11 % (ref 4–12)
NEUTROPHILS # BLD AUTO: 3.97 THOUSANDS/ΜL (ref 1.85–7.62)
NEUTS SEG NFR BLD AUTO: 59 % (ref 43–75)
NONHDLC SERPL-MCNC: 115 MG/DL
NRBC BLD AUTO-RTO: 0 /100 WBCS
PLATELET # BLD AUTO: 186 THOUSANDS/UL (ref 149–390)
PMV BLD AUTO: 11.9 FL (ref 8.9–12.7)
POTASSIUM SERPL-SCNC: 4 MMOL/L (ref 3.5–5.3)
PROT SERPL-MCNC: 7.4 G/DL (ref 6.4–8.2)
RBC # BLD AUTO: 4.6 MILLION/UL (ref 3.88–5.62)
SODIUM SERPL-SCNC: 137 MMOL/L (ref 136–145)
TRIGL SERPL-MCNC: 88 MG/DL
WBC # BLD AUTO: 6.82 THOUSAND/UL (ref 4.31–10.16)

## 2020-07-06 PROCEDURE — 80053 COMPREHEN METABOLIC PANEL: CPT

## 2020-07-06 PROCEDURE — 84153 ASSAY OF PSA TOTAL: CPT

## 2020-07-06 PROCEDURE — 84154 ASSAY OF PSA FREE: CPT

## 2020-07-06 PROCEDURE — 36415 COLL VENOUS BLD VENIPUNCTURE: CPT

## 2020-07-06 PROCEDURE — 85025 COMPLETE CBC W/AUTO DIFF WBC: CPT

## 2020-07-06 PROCEDURE — 80061 LIPID PANEL: CPT

## 2020-07-09 ENCOUNTER — OFFICE VISIT (OUTPATIENT)
Dept: FAMILY MEDICINE CLINIC | Facility: CLINIC | Age: 85
End: 2020-07-09
Payer: MEDICARE

## 2020-07-09 VITALS
SYSTOLIC BLOOD PRESSURE: 120 MMHG | WEIGHT: 174.2 LBS | HEIGHT: 63 IN | HEART RATE: 67 BPM | DIASTOLIC BLOOD PRESSURE: 70 MMHG | TEMPERATURE: 99.6 F | OXYGEN SATURATION: 98 % | BODY MASS INDEX: 30.87 KG/M2

## 2020-07-09 DIAGNOSIS — Z00.00 MEDICARE ANNUAL WELLNESS VISIT, SUBSEQUENT: ICD-10-CM

## 2020-07-09 DIAGNOSIS — I10 ESSENTIAL HYPERTENSION: ICD-10-CM

## 2020-07-09 DIAGNOSIS — E78.2 MIXED HYPERLIPIDEMIA: ICD-10-CM

## 2020-07-09 DIAGNOSIS — K21.9 GASTROESOPHAGEAL REFLUX DISEASE WITHOUT ESOPHAGITIS: Primary | ICD-10-CM

## 2020-07-09 DIAGNOSIS — R97.20 ELEVATED PSA: ICD-10-CM

## 2020-07-09 LAB
PSA FREE MFR SERPL: 29.8 %
PSA FREE SERPL-MCNC: 1.22 NG/ML
PSA SERPL-MCNC: 4.1 NG/ML (ref 0–4)

## 2020-07-09 PROCEDURE — 3078F DIAST BP <80 MM HG: CPT | Performed by: FAMILY MEDICINE

## 2020-07-09 PROCEDURE — 99214 OFFICE O/P EST MOD 30 MIN: CPT | Performed by: FAMILY MEDICINE

## 2020-07-09 PROCEDURE — 3074F SYST BP LT 130 MM HG: CPT | Performed by: FAMILY MEDICINE

## 2020-07-09 PROCEDURE — 1125F AMNT PAIN NOTED PAIN PRSNT: CPT | Performed by: FAMILY MEDICINE

## 2020-07-09 PROCEDURE — 3008F BODY MASS INDEX DOCD: CPT | Performed by: FAMILY MEDICINE

## 2020-07-09 PROCEDURE — 1036F TOBACCO NON-USER: CPT | Performed by: FAMILY MEDICINE

## 2020-07-09 PROCEDURE — 1160F RVW MEDS BY RX/DR IN RCRD: CPT | Performed by: FAMILY MEDICINE

## 2020-07-09 PROCEDURE — G0439 PPPS, SUBSEQ VISIT: HCPCS | Performed by: FAMILY MEDICINE

## 2020-07-09 PROCEDURE — 1170F FXNL STATUS ASSESSED: CPT | Performed by: FAMILY MEDICINE

## 2020-07-09 PROCEDURE — 1123F ACP DISCUSS/DSCN MKR DOCD: CPT | Performed by: FAMILY MEDICINE

## 2020-07-09 NOTE — ASSESSMENT & PLAN NOTE
Mixed hyperlipidemia stable on rosuvastatin 5 mg tablets continue this as directed and follow up with me at next office visit patient works on a heart healthy diet has been doing well at the age of 80 independent

## 2020-07-09 NOTE — PROGRESS NOTES
Assessment/Plan:       Problem List Items Addressed This Visit        Digestive    Gastroesophageal reflux disease without esophagitis - Primary     GERD symptoms stable with omeprazole continue current 20 mg dosage in follow-up with me in next office visit            Cardiovascular and Mediastinum    Essential hypertension      Essential hypertension under good control continue with the same medication lisinopril hydrochlorothiazide blood pressure stable at 1 20/70 no change in dosage at this time avoid sodium in the diet continue to remain active with daily physical activity in light of his advanced age she understands that he needs to contact me for any change in his overall physical well being            Other    Mixed hyperlipidemia      Mixed hyperlipidemia stable on rosuvastatin 5 mg tablets continue this as directed and follow up with me at next office visit patient works on a heart healthy diet has been doing well at the age of 80 independent         Medicare annual wellness visit, subsequent    Elevated PSA      Elevated PSA by history benign prostatic hyperplasia patient notes no change in urinary status continue current regimen of good dietary habits avoiding cold medications such as pseudoephedrine                 Subjective:      Patient ID: Renetta Tirado is a 80 y o  male  Patient presents for general checkup Medicare wellness visit overall he has been doing well continuing with same diet remains active in independent daily      The following portions of the patient's history were reviewed and updated as appropriate: allergies, current medications, past family history, past medical history, past social history, past surgical history and problem list     Review of Systems   Constitutional: Negative for chills, fatigue and fever  HENT: Positive for postnasal drip  Negative for congestion, nosebleeds, rhinorrhea, sinus pressure and sore throat  Eyes: Negative for discharge and redness  Respiratory: Negative for cough and shortness of breath  Cardiovascular: Negative for chest pain, palpitations and leg swelling  Gastrointestinal: Negative for abdominal pain, blood in stool and nausea  Endocrine: Negative for cold intolerance, heat intolerance and polyuria  Genitourinary: Negative for dysuria and frequency  Musculoskeletal: Positive for back pain  Negative for arthralgias and myalgias  Skin: Negative for rash  Neurological: Negative for dizziness, weakness and headaches  Hematological: Negative for adenopathy  Psychiatric/Behavioral: Negative for behavioral problems and sleep disturbance  The patient is not nervous/anxious  Objective:      /70 (BP Location: Left arm, Patient Position: Sitting)   Pulse 67   Temp 99 6 °F (37 6 °C)   Ht 5' 3" (1 6 m)   Wt 79 kg (174 lb 3 2 oz)   SpO2 98%   BMI 30 86 kg/m²        Physical Exam   Constitutional: He is oriented to person, place, and time  He appears well-developed and well-nourished  HENT:   Head: Normocephalic and atraumatic  Right Ear: External ear normal    Left Ear: External ear normal    Nose: Nose normal    Mouth/Throat: Oropharynx is clear and moist    Eyes: Pupils are equal, round, and reactive to light  Conjunctivae and EOM are normal  No scleral icterus  Neck: Normal range of motion  Neck supple  No JVD present  No thyromegaly present  Cardiovascular: Normal rate, regular rhythm and normal heart sounds  No murmur heard  Pulmonary/Chest: Effort normal and breath sounds normal  He has no wheezes  He has no rales  He exhibits no tenderness  Abdominal: Soft  Bowel sounds are normal  He exhibits no distension and no mass  There is no tenderness  There is no rebound and no guarding  Musculoskeletal: Normal range of motion  He exhibits no edema, tenderness or deformity      Mild low back tenderness with paravertebral muscle spasms 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 but stable without effusion or change  Overall good range of motion in all extremities for his age   Lymphadenopathy:     He has no cervical adenopathy  Neurological: He is alert and oriented to person, place, and time  He has normal reflexes  He displays normal reflexes  No cranial nerve deficit  Skin: Skin is warm and dry  No rash noted  No erythema  Psychiatric: He has a normal mood and affect  His behavior is normal  Judgment and thought content normal    Nursing note and vitals reviewed  Data:    Laboratory Results: I have personally reviewed the pertinent laboratory results/reports   Radiology/Other Diagnostic Testing Results: I have personally reviewed pertinent reports         Lab Results   Component Value Date    WBC 6 82 07/06/2020    HGB 13 7 07/06/2020    HCT 43 9 07/06/2020    MCV 95 07/06/2020     07/06/2020     Lab Results   Component Value Date    K 4 0 07/06/2020     07/06/2020    CO2 29 07/06/2020    BUN 14 07/06/2020    CREATININE 1 08 07/06/2020    GLUF 80 07/06/2020    CALCIUM 9 1 07/06/2020    AST 15 07/06/2020    ALT 14 07/06/2020    ALKPHOS 75 07/06/2020    EGFR 59 07/06/2020     Lab Results   Component Value Date    CHOLESTEROL 164 07/06/2020    CHOLESTEROL 173 03/12/2020    CHOLESTEROL 155 06/10/2019     Lab Results   Component Value Date    HDL 49 07/06/2020    HDL 52 03/12/2020    HDL 53 06/10/2019     Lab Results   Component Value Date    LDLCALC 97 07/06/2020    LDLCALC 103 (H) 03/12/2020    LDLCALC 83 06/10/2019     Lab Results   Component Value Date    TRIG 88 07/06/2020    TRIG 88 03/12/2020    TRIG 93 06/10/2019     No results found for: [place], [place]  Lab Results   Component Value Date    [id number] 1 010 03/12/2020     No results found for: HGBA1C  Lab Results   Component Value Date    PSA 4 1 (H) 07/06/2020       Harsha Older, DO

## 2020-07-09 NOTE — ASSESSMENT & PLAN NOTE
Elevated PSA by history benign prostatic hyperplasia patient notes no change in urinary status continue current regimen of good dietary habits avoiding cold medications such as pseudoephedrine

## 2020-07-09 NOTE — PROGRESS NOTES
BMI Counseling: Body mass index is 30 86 kg/m²  The BMI is above normal  Nutrition recommendations include reducing portion sizes, decreasing overall calorie intake, 3-5 servings of fruits/vegetables daily, consuming healthier snacks and moderation in carbohydrate intake  Exercise recommendations include exercising 3-5 times per week  Assessment and Plan:     Problem List Items Addressed This Visit     None           Preventive health issues were discussed with patient, and age appropriate screening tests were ordered as noted in patient's After Visit Summary  Personalized health advice and appropriate referrals for health education or preventive services given if needed, as noted in patient's After Visit Summary       History of Present Illness:     Patient presents for Medicare Annual Wellness visit    Patient Care Team:  Meche Nath DO as PCP - General (Family Medicine)     Problem List:     Patient Active Problem List   Diagnosis    Essential hypertension    Mixed hyperlipidemia    Gastroesophageal reflux disease without esophagitis    Medicare annual wellness visit, subsequent    Elevated PSA    Ankle edema, bilateral    Acute bilateral low back pain without sciatica    Arthritis of right shoulder region      Past Medical and Surgical History:     Past Medical History:   Diagnosis Date    Elevated cholesterol     GERD (gastroesophageal reflux disease)     Hypertension     Sleep apnea      Past Surgical History:   Procedure Laterality Date    APPENDECTOMY        Family History:     Family History   Problem Relation Age of Onset    No Known Problems Mother     No Known Problems Father       Social History:     E-Cigarette/Vaping    E-Cigarette Use Never User      E-Cigarette/Vaping Substances    Nicotine No     THC No     CBD No     Flavoring No     Other No     Unknown No      Social History     Socioeconomic History    Marital status: /Civil Union     Spouse name: None    Number of children: None    Years of education: None    Highest education level: None   Occupational History    None   Social Needs    Financial resource strain: None    Food insecurity:     Worry: None     Inability: None    Transportation needs:     Medical: None     Non-medical: None   Tobacco Use    Smoking status: Former Smoker    Smokeless tobacco: Never Used   Substance and Sexual Activity    Alcohol use: Yes     Comment: glass of wine daily    Drug use: No    Sexual activity: None   Lifestyle    Physical activity:     Days per week: None     Minutes per session: None    Stress: None   Relationships    Social connections:     Talks on phone: None     Gets together: None     Attends Qu[employer] service: None     Active member of club or organization: None     Attends meetings of clubs or organizations: None     Relationship status: None    Intimate partner violence:     Fear of current or ex partner: None     Emotionally abused: None     Physically abused: None     Forced sexual activity: None   Other Topics Concern    None   Social History Narrative    None      Medications and Allergies:     Current Outpatient Medications   Medication Sig Dispense Refill    lisinopril-hydrochlorothiazide (PRINZIDE,ZESTORETIC) 10-12 5 MG per tablet Take 1 tablet by mouth daily 90 tablet 2    omeprazole (PriLOSEC) 20 mg delayed release capsule Take 1 capsule (20 mg total) by mouth daily 90 capsule 2    rosuvastatin (CRESTOR) 5 mg tablet Take 1 tablet (5 mg total) by mouth daily 90 tablet 2    meloxicam (MOBIC) 15 mg tablet Take 1 tablet (15 mg total) by mouth daily (Patient not taking: Reported on 7/9/2020) 30 tablet 0     No current facility-administered medications for this visit  No Known Allergies   Immunizations:     Immunization History   Administered Date(s) Administered    INFLUENZA 09/26/2018    Influenza, high dose seasonal 0 5 mL 09/12/2019      Health Maintenance:      There are no preventive care reminders to display for this patient  Topic Date Due    DTaP,Tdap,and Td Vaccines (1 - Tdap) 11/02/1937    Pneumococcal Vaccine: 65+ Years (1 of 2 - PCV13) 11/02/1991    Influenza Vaccine  07/01/2020      Medicare Health Risk Assessment:     /70 (BP Location: Left arm, Patient Position: Sitting)   Pulse 67   Temp 99 6 °F (37 6 °C)   Ht 5' 3" (1 6 m)   Wt 79 kg (174 lb 3 2 oz)   SpO2 98%   BMI 30 86 kg/m²      Jaqueline Bains is here for his Subsequent Wellness visit  Health Risk Assessment:   Patient rates overall health as good  Patient feels that their physical health rating is same  Eyesight was rated as same  Hearing was rated as same  Patient feels that their emotional and mental health rating is same  Pain experienced in the last 7 days has been some  Patient's pain rating has been 5/10  Depression Screening:   PHQ-2 Score: 0      Fall Risk Screening: In the past year, patient has experienced: no history of falling in past year      Home Safety:  Patient has trouble with stairs inside or outside of their home  Patient has working smoke alarms and has working carbon monoxide detector  Home safety hazards include: none  Nutrition:   Current diet is Regular  Medications:   Patient is currently taking over-the-counter supplements  OTC medications include: see medication list  Patient is able to manage medications  Activities of Daily Living (ADLs)/Instrumental Activities of Daily Living (IADLs):   Walk and transfer into and out of bed and chair?: Yes  Dress and groom yourself?: Yes    Bathe or shower yourself?: Yes    Feed yourself? Yes  Do your laundry/housekeeping?: Yes  Manage your money, pay your bills and track your expenses?: Yes  Make your own meals?: Yes    Do your own shopping?: Yes    Previous Hospitalizations:   Any hospitalizations or ED visits within the last 12 months?: No      Advance Care Planning:   Living will: No    Durable POA for healthcare:  No Advanced directive: No      PREVENTIVE SCREENINGS      Cardiovascular Screening:    General: Screening Not Indicated and History Lipid Disorder      Diabetes Screening:     General: Screening Current      Colorectal Cancer Screening:     General: Screening Not Indicated      Prostate Cancer Screening:    General: Screening Not Indicated      Abdominal Aortic Aneurysm (AAA) Screening:    Risk factors include: tobacco use        Lung Cancer Screening:     General: Screening Not Indicated      Hepatitis C Screening:    General: Risks and Benefits Discussed      Liana Abraham DO

## 2020-07-09 NOTE — ASSESSMENT & PLAN NOTE
GERD symptoms stable with omeprazole continue current 20 mg dosage in follow-up with me in next office visit

## 2020-07-13 ENCOUNTER — OFFICE VISIT (OUTPATIENT)
Dept: FAMILY MEDICINE CLINIC | Facility: CLINIC | Age: 85
End: 2020-07-13
Payer: MEDICARE

## 2020-07-13 VITALS
HEIGHT: 63 IN | TEMPERATURE: 98.9 F | WEIGHT: 176 LBS | SYSTOLIC BLOOD PRESSURE: 124 MMHG | OXYGEN SATURATION: 97 % | DIASTOLIC BLOOD PRESSURE: 70 MMHG | HEART RATE: 67 BPM | BODY MASS INDEX: 31.18 KG/M2
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
## 2020-07-13 DIAGNOSIS — M79.18 INTERCOSTAL MUSCLE PAIN: ICD-10-CM

## 2020-07-13 PROCEDURE — 1036F TOBACCO NON-USER: CPT | Performed by: NURSE PRACTITIONER

## 2020-07-13 PROCEDURE — 1160F RVW MEDS BY RX/DR IN RCRD: CPT | Performed by: NURSE PRACTITIONER

## 2020-07-13 PROCEDURE — 99213 OFFICE O/P EST LOW 20 MIN: CPT | Performed by: NURSE PRACTITIONER

## 2020-07-13 PROCEDURE — 3074F SYST BP LT 130 MM HG: CPT | Performed by: NURSE PRACTITIONER

## 2020-07-13 PROCEDURE — 1170F FXNL STATUS ASSESSED: CPT | Performed by: NURSE PRACTITIONER

## 2020-07-13 PROCEDURE — 3078F DIAST BP <80 MM HG: CPT | Performed by: NURSE PRACTITIONER

## 2020-07-13 PROCEDURE — 3008F BODY MASS INDEX DOCD: CPT | Performed by: NURSE PRACTITIONER

## 2020-07-13 RX ORDER — MELOXICAM 15 MG/1
15 TABLET ORAL DAILY
Qty: 30 TABLET | Refills: 0 | Status: SHIPPED | OUTPATIENT
Start: 2020-07-13 | End: 2020-07-13 | Stop reason: SDUPTHER

## 2020-07-13 RX ORDER — MELOXICAM 15 MG/1
15 TABLET ORAL DAILY
Qty: 30 TABLET | Refills: 0 | Status: SHIPPED | OUTPATIENT
Start: 2020-07-13

## 2020-07-13 RX ORDER — MELOXICAM 15 MG/1
15 TABLET ORAL DAILY
Qty: 30 TABLET | Refills: 0 | Status: SHIPPED | OUTPATIENT
Start: 2020-07-13 | End: 2020-07-13 | Stop reason: CLARIF

## 2020-07-13 NOTE — ASSESSMENT & PLAN NOTE
Start mobic and alternate with tylenol  Declined xrays at this time, call office if symptoms worsen or persists

## 2020-07-13 NOTE — PROGRESS NOTES
OFFICE VISIT  Carlos Enrique Ear 80 y o  male MRN: [id number]          Assessment / Plan:  Problem List Items Addressed This Visit        Musculoskeletal and Integument    Arthritis of right shoulder region    Relevant Medications    meloxicam (MOBIC) 15 mg tablet       Other    Fall - Primary     Start mobic and alternate with tylenol  Declined xrays at this time, call office if symptoms worsen or persists  Reason For Visit / Chief Complaint  Chief Complaint   Patient presents with    Fall     fell Sat & hit his ribs         HPI:  Carlos Enrique Carr is a 80 y o  male fell going down three steps on Saturday (7/11) and hit head and right side  Denies injury or assessment to head and denies nausea, vomiting, headache, sensitivity to light, memory loss, dizziness, or decreased concentration  Pain to right side worsening  Pain increased with exertion and deep breaths  Reports rest and no exercise since injury  Unable to tolerate normal exercise routine due to pain   No pain at rest          Historical Information   Past Medical History:   Diagnosis Date    Elevated cholesterol     GERD (gastroesophageal reflux disease)     Hypertension     Sleep apnea      Past Surgical History:   Procedure Laterality Date    APPENDECTOMY       Social History   Social History     Substance and Sexual Activity   Alcohol Use Yes    Comment: glass of wine daily     Social History     Substance and Sexual Activity   Drug Use No     Social History     Tobacco Use   Smoking Status Former Smoker   Smokeless Tobacco Never Used     Family History   Problem Relation Age of Onset    No Known Problems Mother     No Known Problems Father        Meds/Allergies   No Known Allergies    Meds:    Current Outpatient Medications:     lisinopril-hydrochlorothiazide (PRINZIDE,ZESTORETIC) 10-12 5 MG per tablet, Take 1 tablet by mouth daily, Disp: 90 tablet, Rfl: 2    omeprazole (PriLOSEC) 20 mg delayed release capsule, Take 1 capsule (20 mg total) by mouth daily, Disp: 90 capsule, Rfl: 2    rosuvastatin (CRESTOR) 5 mg tablet, Take 1 tablet (5 mg total) by mouth daily, Disp: 90 tablet, Rfl: 2    meloxicam (MOBIC) 15 mg tablet, Take 1 tablet (15 mg total) by mouth daily, Disp: 30 tablet, Rfl: 0      REVIEW OF SYSTEMS  Review of Systems   Constitutional: Negative for appetite change, fatigue and fever  HENT: Negative for congestion, ear discharge, ear pain and postnasal drip  Eyes: Negative for pain, discharge, redness, itching and visual disturbance  Respiratory: Negative for chest tightness, shortness of breath and wheezing  Cardiovascular: Negative for chest pain, palpitations and leg swelling  Gastrointestinal: Negative for abdominal distention, abdominal pain, blood in stool, diarrhea, nausea and vomiting  Endocrine: Negative for cold intolerance, heat intolerance, polydipsia, polyphagia and polyuria  Genitourinary: Negative for decreased urine volume, difficulty urinating, dysuria, frequency, hematuria, testicular pain and urgency  Musculoskeletal: Positive for arthralgias  Negative for back pain, myalgias, neck pain and neck stiffness  Skin: Negative for color change, pallor, rash and wound  Neurological: Negative for dizziness, light-headedness, numbness and headaches  Hematological: Negative for adenopathy  Does not bruise/bleed easily  Psychiatric/Behavioral: Negative for agitation, behavioral problems, self-injury, sleep disturbance and suicidal ideas  The patient is not nervous/anxious  Current Vitals:   Blood Pressure: 124/70 (07/13/20 1028)  Pulse: 67 (07/13/20 1028)  Temperature: 98 9 °F (37 2 °C) (07/13/20 1028)  Height: 5' 3" (160 cm) (07/13/20 1028)  Weight - Scale: 79 8 kg (176 lb) (07/13/20 1028)  SpO2: 97 % (07/13/20 1028)  [unfilled]    PHYSICAL EXAMS:  Physical Exam   Constitutional: He is oriented to person, place, and time  He appears well-developed and well-nourished     HENT: Head: Normocephalic and atraumatic  Right Ear: External ear normal    Left Ear: External ear normal    Nose: Nose normal    Mouth/Throat: Oropharynx is clear and moist    Eyes: Pupils are equal, round, and reactive to light  Conjunctivae are normal  Right eye exhibits no discharge  Left eye exhibits no discharge  Neck: Normal range of motion  Neck supple  No thyromegaly present  Cardiovascular: Normal rate, regular rhythm and normal heart sounds  Pulmonary/Chest: Effort normal and breath sounds normal    Abdominal: Soft  Bowel sounds are normal  He exhibits no distension  There is no tenderness  Musculoskeletal: Normal range of motion  He exhibits tenderness  He exhibits no deformity  Right upper back area   Neurological: He is alert and oriented to person, place, and time  Skin: Skin is warm and dry  No rash noted  No erythema  Psychiatric: He has a normal mood and affect  His behavior is normal            Lab, imaging and other studies: I have personally reviewed pertinent reports  Dwaine Gutierrez

## 2020-10-08 ENCOUNTER — OFFICE VISIT (OUTPATIENT)
Dept: FAMILY MEDICINE CLINIC | Facility: CLINIC | Age: 85
End: 2020-10-08
Payer: MEDICARE

## 2020-10-08 VITALS
WEIGHT: 171.8 LBS | TEMPERATURE: 97.9 F | DIASTOLIC BLOOD PRESSURE: 76 MMHG | BODY MASS INDEX: 30.44 KG/M2 | SYSTOLIC BLOOD PRESSURE: 130 MMHG | HEART RATE: 74 BPM | HEIGHT: 63 IN | OXYGEN SATURATION: 96 %

## 2020-10-08 DIAGNOSIS — I10 ESSENTIAL HYPERTENSION: ICD-10-CM

## 2020-10-08 DIAGNOSIS — Z23 ENCOUNTER FOR IMMUNIZATION: Primary | ICD-10-CM

## 2020-10-08 DIAGNOSIS — R97.20 ELEVATED PSA: ICD-10-CM

## 2020-10-08 DIAGNOSIS — E78.2 MIXED HYPERLIPIDEMIA: ICD-10-CM

## 2020-10-08 DIAGNOSIS — M25.471 ANKLE EDEMA, BILATERAL: ICD-10-CM

## 2020-10-08 DIAGNOSIS — K21.9 GASTROESOPHAGEAL REFLUX DISEASE WITHOUT ESOPHAGITIS: ICD-10-CM

## 2020-10-08 DIAGNOSIS — M25.472 ANKLE EDEMA, BILATERAL: ICD-10-CM

## 2020-10-08 PROCEDURE — 99214 OFFICE O/P EST MOD 30 MIN: CPT | Performed by: FAMILY MEDICINE

## 2020-10-08 PROCEDURE — G0008 ADMIN INFLUENZA VIRUS VAC: HCPCS

## 2020-10-08 PROCEDURE — 90662 IIV NO PRSV INCREASED AG IM: CPT

## 2020-10-08 RX ORDER — ROSUVASTATIN CALCIUM 5 MG/1
5 TABLET, COATED ORAL DAILY
Qty: 90 TABLET | Refills: 2 | Status: SHIPPED | OUTPATIENT
Start: 2020-10-08 | End: 2020-12-30 | Stop reason: SDUPTHER

## 2020-10-08 RX ORDER — LISINOPRIL AND HYDROCHLOROTHIAZIDE 12.5; 1 MG/1; MG/1
1 TABLET ORAL DAILY
Qty: 90 TABLET | Refills: 2 | Status: SHIPPED | OUTPATIENT
Start: 2020-10-08 | End: 2020-12-30 | Stop reason: SDUPTHER

## 2020-10-08 RX ORDER — OMEPRAZOLE 20 MG/1
20 CAPSULE, DELAYED RELEASE ORAL DAILY
Qty: 90 CAPSULE | Refills: 2 | Status: SHIPPED | OUTPATIENT
Start: 2020-10-08 | End: 2020-12-30 | Stop reason: SDUPTHER

## 2020-12-30 DIAGNOSIS — I10 ESSENTIAL HYPERTENSION: ICD-10-CM

## 2020-12-30 DIAGNOSIS — E78.2 MIXED HYPERLIPIDEMIA: ICD-10-CM

## 2020-12-30 DIAGNOSIS — K21.9 GASTROESOPHAGEAL REFLUX DISEASE WITHOUT ESOPHAGITIS: ICD-10-CM

## 2020-12-30 RX ORDER — OMEPRAZOLE 20 MG/1
20 CAPSULE, DELAYED RELEASE ORAL DAILY
Qty: 90 CAPSULE | Refills: 2 | Status: SHIPPED | OUTPATIENT
Start: 2020-12-30 | End: 2021-03-05 | Stop reason: SDUPTHER

## 2020-12-30 RX ORDER — ROSUVASTATIN CALCIUM 5 MG/1
5 TABLET, COATED ORAL DAILY
Qty: 90 TABLET | Refills: 2 | Status: SHIPPED | OUTPATIENT
Start: 2020-12-30 | End: 2021-03-05 | Stop reason: SDUPTHER

## 2020-12-30 RX ORDER — LISINOPRIL AND HYDROCHLOROTHIAZIDE 12.5; 1 MG/1; MG/1
1 TABLET ORAL DAILY
Qty: 90 TABLET | Refills: 2 | Status: SHIPPED | OUTPATIENT
Start: 2020-12-30 | End: 2021-03-05 | Stop reason: SDUPTHER

## 2021-01-05 ENCOUNTER — APPOINTMENT (OUTPATIENT)
Dept: LAB | Facility: CLINIC | Age: 86
End: 2021-01-05
Payer: MEDICARE

## 2021-01-05 DIAGNOSIS — M25.472 ANKLE EDEMA, BILATERAL: ICD-10-CM

## 2021-01-05 DIAGNOSIS — K21.9 GASTROESOPHAGEAL REFLUX DISEASE WITHOUT ESOPHAGITIS: ICD-10-CM

## 2021-01-05 DIAGNOSIS — Z23 ENCOUNTER FOR IMMUNIZATION: ICD-10-CM

## 2021-01-05 DIAGNOSIS — M25.471 ANKLE EDEMA, BILATERAL: ICD-10-CM

## 2021-01-05 DIAGNOSIS — R97.20 ELEVATED PSA: ICD-10-CM

## 2021-01-05 DIAGNOSIS — I10 ESSENTIAL HYPERTENSION: ICD-10-CM

## 2021-01-05 DIAGNOSIS — E78.2 MIXED HYPERLIPIDEMIA: ICD-10-CM

## 2021-01-05 LAB
ALBUMIN SERPL BCP-MCNC: 4 G/DL (ref 3.5–5)
ALP SERPL-CCNC: 73 U/L (ref 46–116)
ALT SERPL W P-5'-P-CCNC: 15 U/L (ref 12–78)
ANION GAP SERPL CALCULATED.3IONS-SCNC: 2 MMOL/L (ref 4–13)
AST SERPL W P-5'-P-CCNC: 16 U/L (ref 5–45)
BASOPHILS # BLD AUTO: 0.03 THOUSANDS/ΜL (ref 0–0.1)
BASOPHILS NFR BLD AUTO: 0 % (ref 0–1)
BILIRUB SERPL-MCNC: 0.92 MG/DL (ref 0.2–1)
BUN SERPL-MCNC: 18 MG/DL (ref 5–25)
CALCIUM SERPL-MCNC: 9.1 MG/DL (ref 8.3–10.1)
CHLORIDE SERPL-SCNC: 102 MMOL/L (ref 100–108)
CHOLEST SERPL-MCNC: 177 MG/DL (ref 50–200)
CO2 SERPL-SCNC: 31 MMOL/L (ref 21–32)
CREAT SERPL-MCNC: 1.03 MG/DL (ref 0.6–1.3)
EOSINOPHIL # BLD AUTO: 0.42 THOUSAND/ΜL (ref 0–0.61)
EOSINOPHIL NFR BLD AUTO: 5 % (ref 0–6)
ERYTHROCYTE [DISTWIDTH] IN BLOOD BY AUTOMATED COUNT: 13.4 % (ref 11.6–15.1)
GFR SERPL CREATININE-BSD FRML MDRD: 62 ML/MIN/1.73SQ M
GLUCOSE P FAST SERPL-MCNC: 86 MG/DL (ref 65–99)
HCT VFR BLD AUTO: 44.3 % (ref 36.5–49.3)
HDLC SERPL-MCNC: 58 MG/DL
HGB BLD-MCNC: 14.1 G/DL (ref 12–17)
IMM GRANULOCYTES # BLD AUTO: 0.02 THOUSAND/UL (ref 0–0.2)
IMM GRANULOCYTES NFR BLD AUTO: 0 % (ref 0–2)
LDLC SERPL CALC-MCNC: 105 MG/DL (ref 0–100)
LYMPHOCYTES # BLD AUTO: 1.61 THOUSANDS/ΜL (ref 0.6–4.47)
LYMPHOCYTES NFR BLD AUTO: 19 % (ref 14–44)
MCH RBC QN AUTO: 30.2 PG (ref 26.8–34.3)
MCHC RBC AUTO-ENTMCNC: 31.8 G/DL (ref 31.4–37.4)
MCV RBC AUTO: 95 FL (ref 82–98)
MONOCYTES # BLD AUTO: 0.91 THOUSAND/ΜL (ref 0.17–1.22)
MONOCYTES NFR BLD AUTO: 11 % (ref 4–12)
NEUTROPHILS # BLD AUTO: 5.57 THOUSANDS/ΜL (ref 1.85–7.62)
NEUTS SEG NFR BLD AUTO: 65 % (ref 43–75)
NONHDLC SERPL-MCNC: 119 MG/DL
NRBC BLD AUTO-RTO: 0 /100 WBCS
PLATELET # BLD AUTO: 185 THOUSANDS/UL (ref 149–390)
PMV BLD AUTO: 11.8 FL (ref 8.9–12.7)
POTASSIUM SERPL-SCNC: 4 MMOL/L (ref 3.5–5.3)
PROT SERPL-MCNC: 7.5 G/DL (ref 6.4–8.2)
RBC # BLD AUTO: 4.67 MILLION/UL (ref 3.88–5.62)
SODIUM SERPL-SCNC: 135 MMOL/L (ref 136–145)
TRIGL SERPL-MCNC: 69 MG/DL
WBC # BLD AUTO: 8.56 THOUSAND/UL (ref 4.31–10.16)

## 2021-01-05 PROCEDURE — 85025 COMPLETE CBC W/AUTO DIFF WBC: CPT

## 2021-01-05 PROCEDURE — 80053 COMPREHEN METABOLIC PANEL: CPT

## 2021-01-05 PROCEDURE — 80061 LIPID PANEL: CPT

## 2021-01-05 PROCEDURE — 36415 COLL VENOUS BLD VENIPUNCTURE: CPT

## 2021-01-07 ENCOUNTER — OFFICE VISIT (OUTPATIENT)
Dept: FAMILY MEDICINE CLINIC | Facility: CLINIC | Age: 86
End: 2021-01-07
Payer: MEDICARE

## 2021-01-07 VITALS
TEMPERATURE: 97.4 F | WEIGHT: 168 LBS | SYSTOLIC BLOOD PRESSURE: 130 MMHG | HEIGHT: 63 IN | OXYGEN SATURATION: 95 % | HEART RATE: 66 BPM | BODY MASS INDEX: 29.77 KG/M2 | DIASTOLIC BLOOD PRESSURE: 82 MMHG

## 2021-01-07 DIAGNOSIS — E78.2 MIXED HYPERLIPIDEMIA: ICD-10-CM

## 2021-01-07 DIAGNOSIS — M25.471 ANKLE EDEMA, BILATERAL: ICD-10-CM

## 2021-01-07 DIAGNOSIS — K21.9 GASTROESOPHAGEAL REFLUX DISEASE WITHOUT ESOPHAGITIS: ICD-10-CM

## 2021-01-07 DIAGNOSIS — M25.472 ANKLE EDEMA, BILATERAL: ICD-10-CM

## 2021-01-07 DIAGNOSIS — I10 ESSENTIAL HYPERTENSION: Primary | ICD-10-CM

## 2021-01-07 PROCEDURE — 99214 OFFICE O/P EST MOD 30 MIN: CPT | Performed by: FAMILY MEDICINE

## 2021-01-07 NOTE — PROGRESS NOTES
Assessment/Plan:       Problem List Items Addressed This Visit        Digestive    Gastroesophageal reflux disease without esophagitis      GERD symptoms stable with omeprazole no change in dosage continue same dose and follow up at next office visit            Cardiovascular and Mediastinum    Essential hypertension - Primary      Hypertension under stable control with current medication blood pressure is at 130/82 continue lisinopril hydrochlorothiazide combination without change            Other    Mixed hyperlipidemia      Mixed hyperlipidemia stable with rosuvastatin 5 mg tablets levels are maintaining unchanged follow-up at next office visit continue with heart healthy diet         Ankle edema, bilateral      Chronic bilateral ankle edema stable no change maintain good weight control his weight is at 168 lb                 Subjective:      Patient ID: José Luis Zaman is a 80 y o  male  Patient is here today for follow-up evaluation to review laboratory work medications and general health concerns he is doing well living independently after his wife passed away several years ago in exercises every day doing stretching and strengthening exercises for his back legs and arms understanding that he is avoiding falling or balance difficulty  He is diligent about his health care and recently renewed his 's license for the next 4 years      The following portions of the patient's history were reviewed and updated as appropriate: allergies, current medications, past family history, past medical history, past social history, past surgical history and problem list     Review of Systems   Constitutional: Negative for chills, fatigue and fever  HENT: Negative for congestion, nosebleeds, rhinorrhea, sinus pressure and sore throat  Eyes: Negative for discharge and redness  Respiratory: Negative for cough and shortness of breath  Cardiovascular: Negative for chest pain, palpitations and leg swelling  Gastrointestinal: Negative for abdominal pain, blood in stool and nausea  Endocrine: Negative for cold intolerance, heat intolerance and polyuria  Genitourinary: Negative for dysuria and frequency  Musculoskeletal: Negative for arthralgias, back pain and myalgias  Skin: Negative for rash  Neurological: Negative for dizziness, weakness and headaches  Hematological: Negative for adenopathy  Psychiatric/Behavioral: Negative for behavioral problems and sleep disturbance  The patient is not nervous/anxious  Objective:      /82   Pulse 66   Temp (!) 97 4 °F (36 3 °C)   Ht 5' 3" (1 6 m)   Wt 76 2 kg (168 lb)   SpO2 95%   BMI 29 76 kg/m²        Physical Exam  Vitals signs and nursing note reviewed  Constitutional:       Appearance: He is well-developed  HENT:      Head: Normocephalic and atraumatic  Right Ear: External ear normal       Left Ear: External ear normal       Nose: Nose normal    Eyes:      General: No scleral icterus  Conjunctiva/sclera: Conjunctivae normal       Pupils: Pupils are equal, round, and reactive to light  Neck:      Musculoskeletal: Normal range of motion and neck supple  Thyroid: No thyromegaly  Vascular: No JVD  Cardiovascular:      Rate and Rhythm: Normal rate and regular rhythm  Heart sounds: Normal heart sounds  No murmur  Pulmonary:      Effort: Pulmonary effort is normal       Breath sounds: Normal breath sounds  No wheezing or rales  Chest:      Chest wall: No tenderness  Abdominal:      General: Bowel sounds are normal  There is no distension  Palpations: Abdomen is soft  There is no mass  Tenderness: There is no abdominal tenderness  There is no guarding or rebound  Musculoskeletal: Normal range of motion  General: No tenderness or deformity  Right lower leg: Edema present  Left lower leg: Edema present  Lymphadenopathy:      Cervical: No cervical adenopathy     Skin:     General: Skin is warm and dry  Findings: No erythema or rash  Neurological:      Mental Status: He is alert and oriented to person, place, and time  Cranial Nerves: No cranial nerve deficit  Deep Tendon Reflexes: Reflexes are normal and symmetric  Reflexes normal    Psychiatric:         Behavior: Behavior normal          Thought Content: Thought content normal          Judgment: Judgment normal           Data:    Laboratory Results: I have personally reviewed the pertinent laboratory results/reports   Radiology/Other Diagnostic Testing Results: I have personally reviewed pertinent reports         Lab Results   Component Value Date    WBC 8 56 01/05/2021    HGB 14 1 01/05/2021    HCT 44 3 01/05/2021    MCV 95 01/05/2021     01/05/2021     Lab Results   Component Value Date    K 4 0 01/05/2021     01/05/2021    CO2 31 01/05/2021    BUN 18 01/05/2021    CREATININE 1 03 01/05/2021    GLUF 86 01/05/2021    CALCIUM 9 1 01/05/2021    AST 16 01/05/2021    ALT 15 01/05/2021    ALKPHOS 73 01/05/2021    EGFR 62 01/05/2021     Lab Results   Component Value Date    CHOLESTEROL 177 01/05/2021    CHOLESTEROL 164 07/06/2020    CHOLESTEROL 173 03/12/2020     Lab Results   Component Value Date    HDL 58 01/05/2021    HDL 49 07/06/2020    HDL 52 03/12/2020     Lab Results   Component Value Date    LDLCALC 105 (H) 01/05/2021    LDLCALC 97 07/06/2020    LDLCALC 103 (H) 03/12/2020     Lab Results   Component Value Date    TRIG 69 01/05/2021    TRIG 88 07/06/2020    TRIG 88 03/12/2020     No results found for: [place], [place]  Lab Results   Component Value Date    QNF2PAXSVJFV 1 010 03/12/2020     No results found for: HGBA1C  Lab Results   Component Value Date    PSA 4 1 (H) 07/06/2020       Shaun Sanchez DO

## 2021-01-07 NOTE — ASSESSMENT & PLAN NOTE
Mixed hyperlipidemia stable with rosuvastatin 5 mg tablets levels are maintaining unchanged follow-up at next office visit continue with heart healthy diet

## 2021-01-07 NOTE — ASSESSMENT & PLAN NOTE
Hypertension under stable control with current medication blood pressure is at 130/82 continue lisinopril hydrochlorothiazide combination without change

## 2021-01-07 NOTE — ASSESSMENT & PLAN NOTE
GERD symptoms stable with omeprazole no change in dosage continue same dose and follow up at next office visit

## 2021-01-07 NOTE — PATIENT INSTRUCTIONS
Heart Healthy Diet   WHAT YOU NEED TO KNOW:   A heart healthy diet is an eating plan low in unhealthy fats and sodium (salt)  The plan is high in healthy fats and fiber  A heart healthy diet helps improve your cholesterol levels and lowers your risk for heart disease and stroke  A dietitian will teach you how to read and understand food labels  DISCHARGE INSTRUCTIONS:   Heart healthy diet guidelines to follow:   · Choose foods that contain healthy fats  ? Unsaturated fats  include monounsaturated and polyunsaturated fats  Unsaturated fat is found in foods such as soybean, canola, olive, corn, and safflower oils  It is also found in soft tub margarine that is made with liquid vegetable oil  ? Omega-3 fat  is found in certain fish, such as salmon, tuna, and trout, and in walnuts and flaxseed  Eat fish high in omega-3 fats at least 2 times a week  · Get 20 to 30 grams of fiber each day  Fruits, vegetables, whole-grain foods, and legumes (cooked beans) are good sources of fiber  · Limit or do not have unhealthy fats  ? Cholesterol  is found in animal foods, such as eggs and lobster, and in dairy products made from whole milk  Limit cholesterol to less than 200 mg each day  ? Saturated fat  is found in meats, such as mendoza and hamburger  It is also found in chicken or turkey skin, whole milk, and butter  Limit saturated fat to less than 7% of your total daily calories  ? Trans fat  is found in packaged foods, such as potato chips and cookies  It is also in hard margarine, some fried foods, and shortening  Do not eat foods that contain trans fats  · Limit sodium as directed  You may be told to limit sodium to 2,000 to 2,300 mg each day  Choose low-sodium or no-salt-added foods  Add little or no salt to food you prepare  Use herbs and spices in place of salt         Include the following in your heart healthy plan:  Ask your dietitian or healthcare provider how many servings to have from each of the following food groups:  · Grains:      ? Whole-wheat breads, cereals, and pastas, and brown rice    ? Low-fat, low-sodium crackers and chips    · Vegetables:      ? Broccoli, green beans, green peas, and spinach    ? Collards, kale, and lima beans    ? Carrots, sweet potatoes, tomatoes, and peppers    ? Canned vegetables with no salt added    · Fruits:      ? Bananas, peaches, pears, and pineapple    ? Grapes, raisins, and dates    ? Oranges, tangerines, grapefruit, orange juice, and grapefruit juice    ? Apricots, mangoes, melons, and papaya    ? Raspberries and strawberries    ? Canned fruit with no added sugar    · Low-fat dairy:      ? Nonfat (skim) milk, 1% milk, and low-fat almond, cashew, or soy milks fortified with calcium    ? Low-fat cheese, regular or frozen yogurt, and cottage cheese    · Meats and proteins:      ? Lean cuts of beef and pork (loin, leg, round), skinless chicken and turkey    ? Legumes, soy products, egg whites, or nuts    Limit or do not include the following in your heart healthy plan:   · Unhealthy fats and oils:      ? Whole or 2% milk, cream cheese, sour cream, or cheese    ? High-fat cuts of beef (T-bone steaks, ribs), chicken or turkey with skin, and organ meats such as liver    ? Butter, stick margarine, shortening, and cooking oils such as coconut or palm oil    · Foods and liquids high in sodium:      ? Packaged foods, such as frozen dinners, cookies, macaroni and cheese, and cereals with more than 300 mg of sodium per serving    ? Vegetables with added sodium, such as instant potatoes, vegetables with added sauces, or regular canned vegetables    ? Cured or smoked meats, such as hot dogs, mendoza, and sausage    ? High-sodium ketchup, barbecue sauce, salad dressing, pickles, olives, soy sauce, or miso    · Foods and liquids high in sugar:      ? Candy, cake, cookies, pies, or doughnuts    ? Soft drinks (soda), sports drinks, or sweetened tea    ?  Canned or dry mixes for cakes, soups, sauces, or gravies    Other healthy heart guidelines:   · Do not smoke  Nicotine and other chemicals in cigarettes and cigars can cause lung and heart damage  Ask your healthcare provider for information if you currently smoke and need help to quit  E-cigarettes or smokeless tobacco still contain nicotine  Talk to your healthcare provider before you use these products  · Limit or do not drink alcohol as directed  Alcohol can damage your heart and raise your blood pressure  Your healthcare provider may give you specific daily and weekly limits  The general recommended limit is 1 drink a day for women 21 or older and for men 72 or older  Do not have more than 3 drinks in a day or 7 in a week  The recommended limit is 2 drinks a day for men 24to 59years of age  Do not have more than 4 drinks in a day or 14 in a week  A drink of alcohol is 12 ounces of beer, 5 ounces of wine, or 1½ ounces of liquor  · Exercise regularly  Exercise can help you maintain a healthy weight and improve your blood pressure and cholesterol levels  Regular exercise can also decrease your risk for heart problems  Ask your healthcare provider about the best exercise plan for you  Do not start an exercise program without asking your healthcare provider  Follow up with your doctor or cardiologist as directed:  Write down your questions so you remember to ask them during your visits  © Copyright [address] Information is for End User's use only and may not be sold, redistributed or otherwise used for commercial purposes  All illustrations and images included in CareNotes® are the copyrighted property of [employer]  or [address] [place] [address]  The above information is an  only  It is not intended as medical advice for individual conditions or treatments  Talk to your doctor, nurse or pharmacist before following any medical regimen to see if it is safe and effective for you

## 2021-03-05 DIAGNOSIS — I10 ESSENTIAL HYPERTENSION: ICD-10-CM

## 2021-03-05 DIAGNOSIS — E78.2 MIXED HYPERLIPIDEMIA: ICD-10-CM

## 2021-03-05 DIAGNOSIS — K21.9 GASTROESOPHAGEAL REFLUX DISEASE WITHOUT ESOPHAGITIS: ICD-10-CM

## 2021-03-05 RX ORDER — ROSUVASTATIN CALCIUM 5 MG/1
5 TABLET, COATED ORAL DAILY
Qty: 90 TABLET | Refills: 2 | Status: SHIPPED | OUTPATIENT
Start: 2021-03-05 | End: 2021-05-17 | Stop reason: SDUPTHER

## 2021-03-05 RX ORDER — LISINOPRIL AND HYDROCHLOROTHIAZIDE 12.5; 1 MG/1; MG/1
1 TABLET ORAL DAILY
Qty: 90 TABLET | Refills: 2 | Status: SHIPPED | OUTPATIENT
Start: 2021-03-05 | End: 2021-05-17 | Stop reason: SDUPTHER

## 2021-03-05 RX ORDER — OMEPRAZOLE 20 MG/1
20 CAPSULE, DELAYED RELEASE ORAL DAILY
Qty: 90 CAPSULE | Refills: 2 | Status: SHIPPED | OUTPATIENT
Start: 2021-03-05 | End: 2021-05-17 | Stop reason: SDUPTHER

## 2021-04-08 ENCOUNTER — OFFICE VISIT (OUTPATIENT)
Dept: FAMILY MEDICINE CLINIC | Facility: CLINIC | Age: 86
End: 2021-04-08
Payer: MEDICARE

## 2021-04-08 VITALS
DIASTOLIC BLOOD PRESSURE: 68 MMHG | HEIGHT: 63 IN | HEART RATE: 63 BPM | TEMPERATURE: 98.7 F | OXYGEN SATURATION: 98 % | SYSTOLIC BLOOD PRESSURE: 118 MMHG | WEIGHT: 169 LBS | BODY MASS INDEX: 29.95 KG/M2

## 2021-04-08 DIAGNOSIS — R97.20 ELEVATED PSA: ICD-10-CM

## 2021-04-08 DIAGNOSIS — E78.2 MIXED HYPERLIPIDEMIA: ICD-10-CM

## 2021-04-08 DIAGNOSIS — M25.472 ANKLE EDEMA, BILATERAL: ICD-10-CM

## 2021-04-08 DIAGNOSIS — K21.9 GASTROESOPHAGEAL REFLUX DISEASE WITHOUT ESOPHAGITIS: ICD-10-CM

## 2021-04-08 DIAGNOSIS — I10 ESSENTIAL HYPERTENSION: Primary | ICD-10-CM

## 2021-04-08 DIAGNOSIS — M25.471 ANKLE EDEMA, BILATERAL: ICD-10-CM

## 2021-04-08 PROCEDURE — 99214 OFFICE O/P EST MOD 30 MIN: CPT | Performed by: FAMILY MEDICINE

## 2021-04-08 NOTE — PROGRESS NOTES
BMI Counseling: Body mass index is 29 94 kg/m²  The BMI is above normal  Nutrition recommendations include decreasing overall calorie intake, 3-5 servings of fruits/vegetables daily, consuming healthier snacks, decreasing soda and/or juice intake, moderation in carbohydrate intake, increasing intake of lean protein and reducing intake of saturated fat and trans fat  Exercise recommendations include exercising 3-5 times per week

## 2021-04-08 NOTE — PROGRESS NOTES
Assessment/Plan:       Problem List Items Addressed This Visit        Digestive    Gastroesophageal reflux disease without esophagitis      GERD symptoms stable with omeprazole continue same medication no change         Relevant Orders    CBC and differential    Comprehensive metabolic panel    Lipid panel    TSH, 3rd generation with Free T4 reflex    PSA, total and free       Cardiovascular and Mediastinum    Essential hypertension - Primary      Essential hypertension stable control continue with lisinopril no change         Relevant Orders    CBC and differential    Comprehensive metabolic panel    Lipid panel    TSH, 3rd generation with Free T4 reflex    PSA, total and free       Other    Mixed hyperlipidemia      Mixed hyperlipidemia stable with Crestor 5 mg tablets continue follow-up lipid profile stay with heart healthy diet         Relevant Orders    CBC and differential    Comprehensive metabolic panel    Lipid panel    TSH, 3rd generation with Free T4 reflex    PSA, total and free    Elevated PSA      No change in regimen follow-up if symptoms change or worsen urologically patient is doing well with urinary flow         Relevant Orders    CBC and differential    Comprehensive metabolic panel    Lipid panel    TSH, 3rd generation with Free T4 reflex    PSA, total and free    Ankle edema, bilateral      Stable chronic ankle edema no change         Relevant Orders    CBC and differential    Comprehensive metabolic panel    Lipid panel    TSH, 3rd generation with Free T4 reflex    PSA, total and free            Subjective:      Patient ID: Coni Khalil is a 80 y o  male       Patient is here for his periodic checkup doing well overall no complaints he has aches and pains in his back legs and knees at times arthritic complaints      The following portions of the patient's history were reviewed and updated as appropriate: allergies, current medications, past family history, past medical history, past social history, past surgical history and problem list     Review of Systems   Constitutional: Negative for chills, fatigue and fever  HENT: Negative for congestion, nosebleeds, rhinorrhea, sinus pressure and sore throat  Eyes: Negative for discharge and redness  Respiratory: Negative for cough and shortness of breath  Cardiovascular: Negative for chest pain, palpitations and leg swelling  Gastrointestinal: Negative for abdominal pain, blood in stool and nausea  Endocrine: Negative for cold intolerance, heat intolerance and polyuria  Genitourinary: Negative for dysuria and frequency  Musculoskeletal: Negative for arthralgias, back pain and myalgias  Skin: Negative for rash  Neurological: Negative for dizziness, weakness and headaches  Hematological: Negative for adenopathy  Psychiatric/Behavioral: Negative for behavioral problems and sleep disturbance  The patient is not nervous/anxious  Objective:      /68   Pulse 63   Temp 98 7 °F (37 1 °C)   Ht 5' 3" (1 6 m)   Wt 76 7 kg (169 lb)   SpO2 98%   BMI 29 94 kg/m²        Physical Exam  Vitals signs and nursing note reviewed  Constitutional:       Appearance: Normal appearance  He is well-developed and normal weight  HENT:      Head: Normocephalic and atraumatic  Right Ear: Tympanic membrane and external ear normal       Left Ear: Tympanic membrane and external ear normal       Nose: Nose normal       Mouth/Throat:      Mouth: Mucous membranes are moist    Eyes:      General: No scleral icterus  Conjunctiva/sclera: Conjunctivae normal       Pupils: Pupils are equal, round, and reactive to light  Neck:      Musculoskeletal: Normal range of motion and neck supple  Thyroid: No thyromegaly  Vascular: No JVD  Cardiovascular:      Rate and Rhythm: Normal rate and regular rhythm  Heart sounds: Normal heart sounds  No murmur     Pulmonary:      Effort: Pulmonary effort is normal       Breath sounds: Normal breath sounds  No wheezing or rales  Chest:      Chest wall: No tenderness  Abdominal:      General: Bowel sounds are normal  There is no distension  Palpations: Abdomen is soft  There is no mass  Tenderness: There is no abdominal tenderness  There is no guarding or rebound  Musculoskeletal: Normal range of motion  General: No tenderness or deformity  Right lower leg: Edema present  Left lower leg: Edema present  Lymphadenopathy:      Cervical: No cervical adenopathy  Skin:     General: Skin is warm and dry  Findings: No erythema or rash  Neurological:      Mental Status: He is alert and oriented to person, place, and time  Cranial Nerves: No cranial nerve deficit  Deep Tendon Reflexes: Reflexes are normal and symmetric  Reflexes normal    Psychiatric:         Mood and Affect: Mood normal          Behavior: Behavior normal          Thought Content: Thought content normal          Judgment: Judgment normal           Data:    Laboratory Results: I have personally reviewed the pertinent laboratory results/reports   Radiology/Other Diagnostic Testing Results: I have personally reviewed pertinent reports         Lab Results   Component Value Date    WBC 8 56 01/05/2021    HGB 14 1 01/05/2021    HCT 44 3 01/05/2021    MCV 95 01/05/2021     01/05/2021     Lab Results   Component Value Date    K 4 0 01/05/2021     01/05/2021    CO2 31 01/05/2021    BUN 18 01/05/2021    CREATININE 1 03 01/05/2021    GLUF 86 01/05/2021    CALCIUM 9 1 01/05/2021    AST 16 01/05/2021    ALT 15 01/05/2021    ALKPHOS 73 01/05/2021    EGFR 62 01/05/2021     Lab Results   Component Value Date    CHOLESTEROL 177 01/05/2021    CHOLESTEROL 164 07/06/2020    CHOLESTEROL 173 03/12/2020     Lab Results   Component Value Date    HDL 58 01/05/2021    HDL 49 07/06/2020    HDL 52 03/12/2020     Lab Results   Component Value Date    LDLCALC 105 (H) 01/05/2021    LDLCALC 97 07/06/2020 LDLCALC 103 (H) 03/12/2020     Lab Results   Component Value Date    TRIG 69 01/05/2021    TRIG 88 07/06/2020    TRIG 88 03/12/2020     No results found for: [place], [place]  Lab Results   Component Value Date    [id number] 1 010 03/12/2020     No results found for: HGBA1C  Lab Results   Component Value Date    PSA 4 1 (H) 07/06/2020       Ghazala Whitney, DO

## 2021-04-08 NOTE — ASSESSMENT & PLAN NOTE
No change in regimen follow-up if symptoms change or worsen urologically patient is doing well with urinary flow

## 2021-04-09 ENCOUNTER — IMMUNIZATIONS (OUTPATIENT)
Dept: FAMILY MEDICINE CLINIC | Facility: HOSPITAL | Age: 86
End: 2021-04-09

## 2021-05-17 DIAGNOSIS — E78.2 MIXED HYPERLIPIDEMIA: ICD-10-CM

## 2021-05-17 DIAGNOSIS — K21.9 GASTROESOPHAGEAL REFLUX DISEASE WITHOUT ESOPHAGITIS: ICD-10-CM

## 2021-05-17 DIAGNOSIS — I10 ESSENTIAL HYPERTENSION: ICD-10-CM

## 2021-05-17 RX ORDER — OMEPRAZOLE 20 MG/1
20 CAPSULE, DELAYED RELEASE ORAL DAILY
Qty: 90 CAPSULE | Refills: 2 | Status: SHIPPED | OUTPATIENT
Start: 2021-05-17 | End: 2021-08-31 | Stop reason: SDUPTHER

## 2021-05-17 RX ORDER — ROSUVASTATIN CALCIUM 5 MG/1
5 TABLET, COATED ORAL DAILY
Qty: 90 TABLET | Refills: 2 | Status: SHIPPED | OUTPATIENT
Start: 2021-05-17 | End: 2021-08-31 | Stop reason: SDUPTHER

## 2021-05-17 RX ORDER — LISINOPRIL AND HYDROCHLOROTHIAZIDE 12.5; 1 MG/1; MG/1
1 TABLET ORAL DAILY
Qty: 90 TABLET | Refills: 2 | Status: SHIPPED | OUTPATIENT
Start: 2021-05-17 | End: 2021-08-31 | Stop reason: SDUPTHER

## 2021-07-14 ENCOUNTER — APPOINTMENT (OUTPATIENT)
Dept: LAB | Facility: CLINIC | Age: 86
End: 2021-07-14
Payer: MEDICARE

## 2021-07-14 DIAGNOSIS — M25.471 ANKLE EDEMA, BILATERAL: ICD-10-CM

## 2021-07-14 DIAGNOSIS — E78.2 MIXED HYPERLIPIDEMIA: ICD-10-CM

## 2021-07-14 DIAGNOSIS — M25.472 ANKLE EDEMA, BILATERAL: ICD-10-CM

## 2021-07-14 DIAGNOSIS — I10 ESSENTIAL HYPERTENSION: ICD-10-CM

## 2021-07-14 DIAGNOSIS — K21.9 GASTROESOPHAGEAL REFLUX DISEASE WITHOUT ESOPHAGITIS: ICD-10-CM

## 2021-07-14 DIAGNOSIS — R97.20 ELEVATED PSA: ICD-10-CM

## 2021-07-14 LAB
ALBUMIN SERPL BCP-MCNC: 3.7 G/DL (ref 3.5–5)
ALP SERPL-CCNC: 75 U/L (ref 46–116)
ALT SERPL W P-5'-P-CCNC: 16 U/L (ref 12–78)
ANION GAP SERPL CALCULATED.3IONS-SCNC: 2 MMOL/L (ref 4–13)
AST SERPL W P-5'-P-CCNC: 12 U/L (ref 5–45)
BASOPHILS # BLD AUTO: 0.04 THOUSANDS/ΜL (ref 0–0.1)
BASOPHILS NFR BLD AUTO: 1 % (ref 0–1)
BILIRUB SERPL-MCNC: 0.76 MG/DL (ref 0.2–1)
BUN SERPL-MCNC: 7 MG/DL (ref 5–25)
CALCIUM SERPL-MCNC: 8.9 MG/DL (ref 8.3–10.1)
CHLORIDE SERPL-SCNC: 100 MMOL/L (ref 100–108)
CHOLEST SERPL-MCNC: 174 MG/DL (ref 50–200)
CO2 SERPL-SCNC: 33 MMOL/L (ref 21–32)
CREAT SERPL-MCNC: 0.95 MG/DL (ref 0.6–1.3)
EOSINOPHIL # BLD AUTO: 0.51 THOUSAND/ΜL (ref 0–0.61)
EOSINOPHIL NFR BLD AUTO: 7 % (ref 0–6)
ERYTHROCYTE [DISTWIDTH] IN BLOOD BY AUTOMATED COUNT: 13.1 % (ref 11.6–15.1)
GFR SERPL CREATININE-BSD FRML MDRD: 68 ML/MIN/1.73SQ M
GLUCOSE P FAST SERPL-MCNC: 87 MG/DL (ref 65–99)
HCT VFR BLD AUTO: 44.9 % (ref 36.5–49.3)
HDLC SERPL-MCNC: 57 MG/DL
HGB BLD-MCNC: 14.5 G/DL (ref 12–17)
IMM GRANULOCYTES # BLD AUTO: 0.03 THOUSAND/UL (ref 0–0.2)
IMM GRANULOCYTES NFR BLD AUTO: 0 % (ref 0–2)
LDLC SERPL CALC-MCNC: 97 MG/DL (ref 0–100)
LYMPHOCYTES # BLD AUTO: 1.66 THOUSANDS/ΜL (ref 0.6–4.47)
LYMPHOCYTES NFR BLD AUTO: 22 % (ref 14–44)
MCH RBC QN AUTO: 31.3 PG (ref 26.8–34.3)
MCHC RBC AUTO-ENTMCNC: 32.3 G/DL (ref 31.4–37.4)
MCV RBC AUTO: 97 FL (ref 82–98)
MONOCYTES # BLD AUTO: 0.83 THOUSAND/ΜL (ref 0.17–1.22)
MONOCYTES NFR BLD AUTO: 11 % (ref 4–12)
NEUTROPHILS # BLD AUTO: 4.51 THOUSANDS/ΜL (ref 1.85–7.62)
NEUTS SEG NFR BLD AUTO: 59 % (ref 43–75)
NONHDLC SERPL-MCNC: 117 MG/DL
NRBC BLD AUTO-RTO: 0 /100 WBCS
PLATELET # BLD AUTO: 180 THOUSANDS/UL (ref 149–390)
PMV BLD AUTO: 12.1 FL (ref 8.9–12.7)
POTASSIUM SERPL-SCNC: 3.7 MMOL/L (ref 3.5–5.3)
PROT SERPL-MCNC: 7.3 G/DL (ref 6.4–8.2)
RBC # BLD AUTO: 4.64 MILLION/UL (ref 3.88–5.62)
SODIUM SERPL-SCNC: 135 MMOL/L (ref 136–145)
TRIGL SERPL-MCNC: 99 MG/DL
TSH SERPL DL<=0.05 MIU/L-ACNC: 1.12 UIU/ML (ref 0.36–3.74)
WBC # BLD AUTO: 7.58 THOUSAND/UL (ref 4.31–10.16)

## 2021-07-14 PROCEDURE — 84443 ASSAY THYROID STIM HORMONE: CPT

## 2021-07-14 PROCEDURE — 84154 ASSAY OF PSA FREE: CPT

## 2021-07-14 PROCEDURE — 80053 COMPREHEN METABOLIC PANEL: CPT

## 2021-07-14 PROCEDURE — 80061 LIPID PANEL: CPT

## 2021-07-14 PROCEDURE — 36415 COLL VENOUS BLD VENIPUNCTURE: CPT

## 2021-07-14 PROCEDURE — 84153 ASSAY OF PSA TOTAL: CPT

## 2021-07-14 PROCEDURE — 85025 COMPLETE CBC W/AUTO DIFF WBC: CPT

## 2021-07-16 ENCOUNTER — OFFICE VISIT (OUTPATIENT)
Dept: FAMILY MEDICINE CLINIC | Facility: CLINIC | Age: 86
End: 2021-07-16
Payer: MEDICARE

## 2021-07-16 VITALS
RESPIRATION RATE: 22 BRPM | BODY MASS INDEX: 30.3 KG/M2 | OXYGEN SATURATION: 95 % | HEART RATE: 71 BPM | HEIGHT: 63 IN | WEIGHT: 171 LBS | SYSTOLIC BLOOD PRESSURE: 122 MMHG | TEMPERATURE: 99.8 F | DIASTOLIC BLOOD PRESSURE: 70 MMHG

## 2021-07-16 DIAGNOSIS — R97.20 ELEVATED PSA: ICD-10-CM

## 2021-07-16 DIAGNOSIS — E78.2 MIXED HYPERLIPIDEMIA: ICD-10-CM

## 2021-07-16 DIAGNOSIS — Z00.00 MEDICARE ANNUAL WELLNESS VISIT, SUBSEQUENT: ICD-10-CM

## 2021-07-16 DIAGNOSIS — I10 ESSENTIAL HYPERTENSION: ICD-10-CM

## 2021-07-16 DIAGNOSIS — K21.9 GASTROESOPHAGEAL REFLUX DISEASE WITHOUT ESOPHAGITIS: Primary | ICD-10-CM

## 2021-07-16 LAB
PSA FREE MFR SERPL: 26.3 %
PSA FREE SERPL-MCNC: 1.42 NG/ML
PSA SERPL-MCNC: 5.4 NG/ML (ref 0–4)

## 2021-07-16 PROCEDURE — 99214 OFFICE O/P EST MOD 30 MIN: CPT | Performed by: FAMILY MEDICINE

## 2021-07-16 PROCEDURE — 1123F ACP DISCUSS/DSCN MKR DOCD: CPT | Performed by: FAMILY MEDICINE

## 2021-07-16 PROCEDURE — G0439 PPPS, SUBSEQ VISIT: HCPCS | Performed by: FAMILY MEDICINE

## 2021-07-16 NOTE — ASSESSMENT & PLAN NOTE
Essential hypertension under stable condition with hydrochlorothiazide lisinopril combination patient is doing well overall no new complaints or problems recently    He understands to watch his diet he is continuing on his same diet regimen now and he is more active over the summer

## 2021-07-16 NOTE — PROGRESS NOTES
Assessment and Plan:     Problem List Items Addressed This Visit     None           Preventive health issues were discussed with patient, and age appropriate screening tests were ordered as noted in patient's After Visit Summary  Personalized health advice and appropriate referrals for health education or preventive services given if needed, as noted in patient's After Visit Summary       History of Present Illness:     Patient presents for Medicare Annual Wellness visit    Patient Care Team:  Elvia Khan DO as PCP - General (Family Medicine)     Problem List:     Patient Active Problem List   Diagnosis    Essential hypertension    Mixed hyperlipidemia    Gastroesophageal reflux disease without esophagitis    Medicare annual wellness visit, subsequent    Elevated PSA    Ankle edema, bilateral    Acute bilateral low back pain without sciatica    Arthritis of right shoulder region    Fall      Past Medical and Surgical History:     Past Medical History:   Diagnosis Date    Elevated cholesterol     GERD (gastroesophageal reflux disease)     Hypertension     Sleep apnea      Past Surgical History:   Procedure Laterality Date    APPENDECTOMY        Family History:     Family History   Problem Relation Age of Onset    No Known Problems Mother     No Known Problems Father       Social History:     Social History     Socioeconomic History    Marital status: /Civil Union     Spouse name: None    Number of children: None    Years of education: None    Highest education level: None   Occupational History    None   Tobacco Use    Smoking status: Former Smoker     Packs/day: 0 25     Years: 10 00     Pack years: 2 50     Types: Cigarettes     Quit date:      Years since quittin 5    Smokeless tobacco: Never Used   Vaping Use    Vaping Use: Never used   Substance and Sexual Activity    Alcohol use: Yes     Comment: glass of wine daily    Drug use: No    Sexual activity: None Other Topics Concern    None   Social History Narrative    None     Social Determinants of Health     Financial Resource Strain:     Difficulty of Paying Living Expenses:    Food Insecurity:     Worried About Running Out of Food in the Last Year:     [address] in the Last Year:    Transportation Needs:     Lack of Transportation (Medical):  Lack of Transportation (Non-Medical):    Physical Activity:     Days of Exercise per Week:     Minutes of Exercise per Session:    Stress:     Feeling of Stress :    Social Connections:     Frequency of Communication with Friends and Family:     Frequency of Social Gatherings with Friends and Family:     Attends Confucianist Services:     Active Member of Clubs or Organizations:     Attends Club or Organization Meetings:     Marital Status:    Intimate Partner Violence:     Fear of Current or Ex-Partner:     Emotionally Abused:     Physically Abused:     Sexually Abused:       Medications and Allergies:     Current Outpatient Medications   Medication Sig Dispense Refill    lisinopril-hydrochlorothiazide (PRINZIDE,ZESTORETIC) 10-12 5 MG per tablet Take 1 tablet by mouth daily 90 tablet 2    meloxicam (MOBIC) 15 mg tablet Take 1 tablet (15 mg total) by mouth daily 30 tablet 0    omeprazole (PriLOSEC) 20 mg delayed release capsule Take 1 capsule (20 mg total) by mouth daily 90 capsule 2    rosuvastatin (CRESTOR) 5 mg tablet Take 1 tablet (5 mg total) by mouth daily 90 tablet 2     No current facility-administered medications for this visit  No Known Allergies   Immunizations:     Immunization History   Administered Date(s) Administered    INFLUENZA 09/26/2018    Influenza, high dose seasonal 0 7 mL 09/12/2019, 10/08/2020    Sars-cov-2 / Covid-19 vector-nr, [id number] vaccine Landon Vasquez / [employer]) 04/09/2021      Health Maintenance: There are no preventive care reminders to display for this patient        Topic Date Due    Influenza Vaccine (1) 09/01/2021      Medicare Health Risk Assessment:     /70 (BP Location: Left arm, Patient Position: Sitting)   Pulse 71   Temp 99 8 °F (37 7 °C)   Resp 22   Ht 5' 3" (1 6 m)   Wt 77 6 kg (171 lb)   SpO2 95%   BMI 30 29 kg/m²          Health Risk Assessment:   Patient rates overall health as good  Patient feels that their physical health rating is same  Patient is very satisfied with their life  Eyesight was rated as same  Hearing was rated as same  Patient feels that their emotional and mental health rating is same  Patients states they are never, rarely angry  Patient states they are sometimes unusually tired/fatigued  Pain experienced in the last 7 days has been none  Patient states that he has experienced no weight loss or gain in last 6 months  Depression Screening:   PHQ-2 Score: 0      Fall Risk Screening: In the past year, patient has experienced: no history of falling in past year      Home Safety:  Patient has trouble with stairs inside or outside of their home  Patient has working smoke alarms and has working carbon monoxide detector  Home safety hazards include: none  Nutrition:   Current diet is Regular  Medications:   Patient is not currently taking any over-the-counter supplements  Patient is able to manage medications  Activities of Daily Living (ADLs)/Instrumental Activities of Daily Living (IADLs):   Walk and transfer into and out of bed and chair?: Yes  Dress and groom yourself?: Yes    Bathe or shower yourself?: Yes    Feed yourself? Yes  Do your laundry/housekeeping?: Yes  Manage your money, pay your bills and track your expenses?: Yes  Make your own meals?: Yes    Do your own shopping?: Yes    Previous Hospitalizations:   Any hospitalizations or ED visits within the last 12 months?: No      Advance Care Planning:   Living will: Yes    Durable POA for healthcare:  Yes    Advanced directive: Yes      PREVENTIVE SCREENINGS      Cardiovascular Screening: General: Screening Not Indicated and History Lipid Disorder      Diabetes Screening:     General: Screening Current      Colorectal Cancer Screening:     General: Screening Not Indicated      Prostate Cancer Screening:    General: Screening Not Indicated      Abdominal Aortic Aneurysm (AAA) Screening:    Risk factors include: tobacco use        Lung Cancer Screening:     General: Screening Not Indicated    Screening, Brief Intervention, and Referral to Treatment (SBIRT)    Screening  Typical number of drinks in a day: 1  Typical number of drinks in a week: 7  Interpretation: Low risk drinking behavior  AUDIT-C Screenin) How often did you have a drink containing alcohol in the past year? 4 or more times a week  2) How many drinks did you have on a typical day when you were drinking in the past year? 1 to 2  3) How often did you have 6 or more drinks on one occasion in the past year? never    AUDIT-C Score: 4  Interpretation: Score 4-12 (male): POSITIVE screen for alcohol misuse    AUDIT Screenin) How often during the last year have you found that you were not able to stop drinking once you had started? 0 - never  5) How often during the last year have you failed to do what was normally expected from you because of drinking? 0 - never  6) How often during the last year have you needed a first drink in the morning to get yourself going after a heavy drinking session?  0 - never  7) How often during the last year have you had a feeling of guilt or remorse after drinking? 0 - never  8) How often during the last year have you been unable to remember what happened the night before because you had been drinking? 0 - never  9) Have you or someone else been injured as a result of your drinking? 0 - no  10) Has a relative or friend or a doctor or another health worker been concerned about your drinking or suggested you cut down? 0 - no    AUDIT Score: 4  Interpretation: Low risk alcohol consumption  Falls Plan of Care: Balance, strength, and gait training instructions were provided  BMI Counseling: Body mass index is 30 29 kg/m²  The BMI is above normal  Nutrition recommendations include reducing portion sizes, decreasing overall calorie intake, 3-5 servings of fruits/vegetables daily, reducing fast food intake, consuming healthier snacks, decreasing soda and/or juice intake, moderation in carbohydrate intake, increasing intake of lean protein, reducing intake of saturated fat and trans fat and reducing intake of cholesterol  Exercise recommendations include exercising 3-5 times per week      Rebecca Munguia DO

## 2021-07-16 NOTE — PROGRESS NOTES
Assessment/Plan:       Problem List Items Addressed This Visit        Digestive    Gastroesophageal reflux disease without esophagitis - Primary     GERD symptoms stable continue omeprazole 20 mg capsule            Cardiovascular and Mediastinum    Essential hypertension      Essential hypertension under stable condition with hydrochlorothiazide lisinopril combination patient is doing well overall no new complaints or problems recently  He understands to watch his diet he is continuing on his same diet regimen now and he is more active over the summer            Other    Mixed hyperlipidemia      Mixed hyperlipidemia stable currently on Crestor 5 mg tablets         Medicare annual wellness visit, subsequent    Elevated PSA      Elevated PSA chronic from benign prostatic hyperplasia no change in urinary status doing well overall follow-up annually                 Subjective:      Patient ID: Adarsh Mac is a 80 y o  male  Patient is here presenting for general checkup and evaluation doing well overall no additional new problems      The following portions of the patient's history were reviewed and updated as appropriate: allergies, current medications, past family history, past medical history, past social history, past surgical history and problem list     Review of Systems   Constitutional: Negative for chills, fatigue and fever  HENT: Negative for congestion, nosebleeds, rhinorrhea, sinus pressure and sore throat  Eyes: Negative for discharge and redness  Respiratory: Negative for cough and shortness of breath  Cardiovascular: Negative for chest pain, palpitations and leg swelling  Gastrointestinal: Negative for abdominal pain, blood in stool and nausea  Endocrine: Negative for cold intolerance, heat intolerance and polyuria  Genitourinary: Negative for dysuria and frequency  Musculoskeletal: Negative for arthralgias, back pain and myalgias  Skin: Negative for rash     Neurological: Negative for dizziness, weakness and headaches  Hematological: Negative for adenopathy  Psychiatric/Behavioral: Negative for behavioral problems and sleep disturbance  The patient is not nervous/anxious  Objective:      /70 (BP Location: Left arm, Patient Position: Sitting)   Pulse 71   Temp 99 8 °F (37 7 °C)   Resp 22   Ht 5' 3" (1 6 m)   Wt 77 6 kg (171 lb)   SpO2 95%   BMI 30 29 kg/m²        Physical Exam  Vitals and nursing note reviewed  Constitutional:       Appearance: Normal appearance  He is well-developed and normal weight  HENT:      Head: Normocephalic and atraumatic  Right Ear: Tympanic membrane, ear canal and external ear normal       Left Ear: Tympanic membrane, ear canal and external ear normal       Nose: Nose normal       Mouth/Throat:      Mouth: Mucous membranes are moist       Pharynx: Oropharynx is clear  Eyes:      General: No scleral icterus  Conjunctiva/sclera: Conjunctivae normal       Pupils: Pupils are equal, round, and reactive to light  Neck:      Thyroid: No thyromegaly  Vascular: No JVD  Cardiovascular:      Rate and Rhythm: Normal rate and regular rhythm  Pulses: Normal pulses  Heart sounds: Normal heart sounds  No murmur heard  Pulmonary:      Effort: Pulmonary effort is normal       Breath sounds: Normal breath sounds  No wheezing or rales  Chest:      Chest wall: No tenderness  Abdominal:      General: Bowel sounds are normal  There is no distension  Palpations: Abdomen is soft  There is no mass  Tenderness: There is no abdominal tenderness  There is no guarding or rebound  Musculoskeletal:         General: No tenderness or deformity  Normal range of motion  Cervical back: Normal range of motion and neck supple  Lymphadenopathy:      Cervical: No cervical adenopathy  Skin:     General: Skin is warm and dry  Capillary Refill: Capillary refill takes less than 2 seconds        Findings: No erythema or rash  Neurological:      General: No focal deficit present  Mental Status: He is alert and oriented to person, place, and time  Cranial Nerves: No cranial nerve deficit  Deep Tendon Reflexes: Reflexes are normal and symmetric  Reflexes normal    Psychiatric:         Mood and Affect: Mood normal          Behavior: Behavior normal          Thought Content: Thought content normal          Judgment: Judgment normal           Data:    Laboratory Results: I have personally reviewed the pertinent laboratory results/reports   Radiology/Other Diagnostic Testing Results: I have personally reviewed pertinent reports         Lab Results   Component Value Date    WBC 7 58 07/14/2021    HGB 14 5 07/14/2021    HCT 44 9 07/14/2021    MCV 97 07/14/2021     07/14/2021     Lab Results   Component Value Date    K 3 7 07/14/2021     07/14/2021    CO2 33 (H) 07/14/2021    BUN 7 07/14/2021    CREATININE 0 95 07/14/2021    GLUF 87 07/14/2021    CALCIUM 8 9 07/14/2021    AST 12 07/14/2021    ALT 16 07/14/2021    ALKPHOS 75 07/14/2021    EGFR 68 07/14/2021     Lab Results   Component Value Date    CHOLESTEROL 174 07/14/2021    CHOLESTEROL 177 01/05/2021    CHOLESTEROL 164 07/06/2020     Lab Results   Component Value Date    HDL 57 07/14/2021    HDL 58 01/05/2021    HDL 49 07/06/2020     Lab Results   Component Value Date    LDLCALC 97 07/14/2021    LDLCALC 105 (H) 01/05/2021    LDLCALC 97 07/06/2020     Lab Results   Component Value Date    TRIG 99 07/14/2021    TRIG 69 01/05/2021    TRIG 88 07/06/2020     No results found for: [place], [place]  Lab Results   Component Value Date    [id number] 1 120 07/14/2021     No results found for: HGBA1C  Lab Results   Component Value Date    PSA 4 1 (H) 07/06/2020       Xenia Huang DO

## 2021-07-16 NOTE — ASSESSMENT & PLAN NOTE
Elevated PSA chronic from benign prostatic hyperplasia no change in urinary status doing well overall follow-up annually

## 2021-08-31 DIAGNOSIS — E78.2 MIXED HYPERLIPIDEMIA: ICD-10-CM

## 2021-08-31 DIAGNOSIS — I10 ESSENTIAL HYPERTENSION: ICD-10-CM

## 2021-08-31 DIAGNOSIS — K21.9 GASTROESOPHAGEAL REFLUX DISEASE WITHOUT ESOPHAGITIS: ICD-10-CM

## 2021-08-31 RX ORDER — OMEPRAZOLE 20 MG/1
20 CAPSULE, DELAYED RELEASE ORAL DAILY
Qty: 90 CAPSULE | Refills: 2 | Status: SHIPPED | OUTPATIENT
Start: 2021-08-31 | End: 2021-10-28 | Stop reason: SDUPTHER

## 2021-08-31 RX ORDER — LISINOPRIL AND HYDROCHLOROTHIAZIDE 12.5; 1 MG/1; MG/1
1 TABLET ORAL DAILY
Qty: 90 TABLET | Refills: 2 | Status: SHIPPED | OUTPATIENT
Start: 2021-08-31 | End: 2021-09-23 | Stop reason: DRUGHIGH

## 2021-08-31 RX ORDER — ROSUVASTATIN CALCIUM 5 MG/1
5 TABLET, COATED ORAL DAILY
Qty: 90 TABLET | Refills: 2 | Status: SHIPPED | OUTPATIENT
Start: 2021-08-31 | End: 2021-10-28 | Stop reason: SDUPTHER

## 2021-09-20 ENCOUNTER — APPOINTMENT (OUTPATIENT)
Dept: RADIOLOGY | Facility: CLINIC | Age: 86
End: 2021-09-20
Payer: MEDICARE

## 2021-09-20 ENCOUNTER — OFFICE VISIT (OUTPATIENT)
Dept: URGENT CARE | Facility: CLINIC | Age: 86
End: 2021-09-20
Payer: MEDICARE

## 2021-09-20 ENCOUNTER — TELEPHONE (OUTPATIENT)
Dept: URGENT CARE | Facility: CLINIC | Age: 86
End: 2021-09-20

## 2021-09-20 VITALS
WEIGHT: 174.8 LBS | DIASTOLIC BLOOD PRESSURE: 78 MMHG | TEMPERATURE: 98.2 F | BODY MASS INDEX: 34.32 KG/M2 | SYSTOLIC BLOOD PRESSURE: 142 MMHG | HEIGHT: 60 IN | HEART RATE: 65 BPM | OXYGEN SATURATION: 97 %

## 2021-09-20 DIAGNOSIS — S61.216A LACERATION OF RIGHT LITTLE FINGER WITHOUT FOREIGN BODY WITHOUT DAMAGE TO NAIL, INITIAL ENCOUNTER: ICD-10-CM

## 2021-09-20 DIAGNOSIS — Z78.9 TETANUS TOXOID VACCINATION STATUS UNKNOWN: Primary | ICD-10-CM

## 2021-09-20 DIAGNOSIS — M54.50 ACUTE MIDLINE LOW BACK PAIN, UNSPECIFIED WHETHER SCIATICA PRESENT: ICD-10-CM
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
## 2021-09-20 PROCEDURE — 99213 OFFICE O/P EST LOW 20 MIN: CPT | Performed by: NURSE PRACTITIONER

## 2021-09-20 PROCEDURE — 72100 X-RAY EXAM L-S SPINE 2/3 VWS: CPT

## 2021-09-20 PROCEDURE — 12001 RPR S/N/AX/GEN/TRNK 2.5CM/<: CPT | Performed by: NURSE PRACTITIONER

## 2021-09-20 PROCEDURE — G0463 HOSPITAL OUTPT CLINIC VISIT: HCPCS | Performed by: NURSE PRACTITIONER

## 2021-09-20 RX ORDER — CEPHALEXIN 500 MG/1
500 CAPSULE ORAL EVERY 8 HOURS SCHEDULED
Qty: 21 CAPSULE | Refills: 0 | Status: SHIPPED | OUTPATIENT
Start: 2021-09-20 | End: 2021-09-27

## 2021-09-20 NOTE — PROGRESS NOTES
[facility]        NAME: Darion Romano is a 80 y o  male  : 1926    MRN: [id number]  DATE: 2021  TIME: 1:01 PM    Assessment and Plan   Tetanus toxoid vaccination status unknown [Z78 9]  1  Tetanus toxoid vaccination status unknown  Tdap Vaccine greater than or equal to 8yo   2  Acute midline low back pain, unspecified whether sciatica present  XR spine lumbar 2 or 3 views injury   3  Laceration of right little finger without foreign body without damage to nail, initial encounter  cephalexin (KEFLEX) 500 mg capsule   4  Fall, initial encounter           Patient Instructions     Patient Instructions   Patient is not on any blood thinners  But did discussed risk of intracranial bleed since he did hit his head  Son-in-law states he did not hit his head hard and hit his back 1st   They declined going to the ER this time  They will continue to monitor closely  He denies any symptoms at this time  Four sutures inserted into right pinky finger laceration  Since wound was close to 24 hours since injury sutures were loosely applied  Continue monitor site closely for infection  Would recommend you have this rechecked in 2-3 days  Start antibiotic  Suture removal in 7-10 days  Keep site clean dry and covered  Apply triple antibiotic to site  There is degenerative changes noted on the x-ray of the back  There is no acute abnormality  Will call you if final read is different  As we discussed if he develops any headache, nausea, vomiting, change in vision, lethargy, confusion, loss of bowel or bladder or weaknesses like he needs to go directly to the emergency room  Chief Complaint     Chief Complaint   Patient presents with    Finger Laceration     fell and injured his right pinky finger also has pain to his lower back yesterday  History of Present Illness   Kevin Shah presents to the clinic c/o    This is a 80year old male here today with complaints fall  He is here today with son-in-law  They state yesterday afternoon he was on a deck  He states the dog jumped up and knocked him over  He fell onto his back  His son-in-law states he slightly back hip the back of his head  No loss of consciousness  No headaches  No nausea vomiting no dizziness, no confusion, and he is not on blood thinner  Patient states he is not having any symptoms at this time  He also sustained a laceration over the right 5th finger at the MCP joint  Tdap is not up-to-date  He is having minimal discomfort in his back  Pain is described as stiffness  Son-in-law states pain is primarily with moving in different positions  He denies any loss of bowel or bladder  He denies any numbness or tingling into his legs  Review of Systems   Review of Systems   Constitutional: Negative  Respiratory: Negative  Cardiovascular: Negative  Musculoskeletal: Positive for back pain  Skin: Positive for wound  Neurological: Negative  Negative for dizziness, weakness, light-headedness, numbness and headaches  Psychiatric/Behavioral: Negative            Current Medications     Long-Term Medications   Medication Sig Dispense Refill    lisinopril-hydrochlorothiazide (PRINZIDE,ZESTORETIC) 10-12 5 MG per tablet Take 1 tablet by mouth daily 90 tablet 2    meloxicam (MOBIC) 15 mg tablet Take 1 tablet (15 mg total) by mouth daily 30 tablet 0    omeprazole (PriLOSEC) 20 mg delayed release capsule Take 1 capsule (20 mg total) by mouth daily 90 capsule 2    rosuvastatin (CRESTOR) 5 mg tablet Take 1 tablet (5 mg total) by mouth daily 90 tablet 2       Current Allergies     Allergies as of 09/20/2021    (No Known Allergies)            The following portions of the patient's history were reviewed and updated as appropriate: allergies, current medications, past family history, past medical history, past social history, past surgical history and problem list     Objective   /78 (BP Location: Right arm, Patient Position: Sitting, Cuff Size: Large)   Pulse 65   Temp 98 2 °F (36 8 °C)   Ht 5' (1 524 m)   Wt 79 3 kg (174 lb 12 8 oz)   SpO2 97%   BMI 34 14 kg/m²          Physical Exam     Physical Exam  Vitals and nursing note reviewed  Constitutional:       General: He is not in acute distress  Appearance: Normal appearance  He is not ill-appearing or toxic-appearing  Cardiovascular:      Rate and Rhythm: Normal rate and regular rhythm  Pulses: Normal pulses  Heart sounds: Normal heart sounds  Pulmonary:      Effort: Pulmonary effort is normal       Breath sounds: Normal breath sounds  Musculoskeletal:      Comments: No tenderness to palpate over the lumbar spine  Mild tenderness palpate over the right and para left remember region  Slight decrease in range of motion due to discomfort  No weakness in the legs   Skin:     Comments: Right hand: there is a 2 5 cm laceration at the MCP joint on palmar surface of the hand  Wound soaked and cleansed  Neurological:      General: No focal deficit present  Mental Status: He is alert and oriented to person, place, and time  Psychiatric:         Mood and Affect: Mood normal          Behavior: Behavior normal          Thought Content: Thought content normal          Judgment: Judgment normal          Laceration repair    Date/Time: 9/20/2021 2:00 PM  Performed by: Adeel Lomeli  Authorized by: CHERRI Schmidt   Consent: Verbal consent obtained    Consent given by: patient  Patient identity confirmed: verbally with patient  Body area: upper extremity  Location details: right small finger  Laceration length: 2 5 cm  Foreign bodies: no foreign bodies    Anesthesia:  Local Anesthetic: lidocaine 1% without epinephrine  Anesthetic total: 1 mL      Procedure Details:  Amount of cleaning: standard  Skin closure: 5-0 nylon  Approximation: loose  Approximation difficulty: simple  Dressing: antibiotic ointment and 4x4 sterile gauze  Patient tolerance: patient tolerated the procedure well with no immediate complications

## 2021-09-20 NOTE — PATIENT INSTRUCTIONS
Patient is not on any blood thinners  But did discussed risk of intracranial bleed since he did hit his head  Son-in-law states he did not hit his head hard and hit his back 1st   They declined going to the ER this time  They will continue to monitor closely  He denies any symptoms at this time  Four sutures inserted into right pinky finger laceration  Since wound was close to 24 hours since injury sutures were loosely applied  Continue monitor site closely for infection  Would recommend you have this rechecked in 2-3 days  Start antibiotic  Suture removal in 7-10 days  Keep site clean dry and covered  Apply triple antibiotic to site  There is degenerative changes noted on the x-ray of the back  There is no acute abnormality  Will call you if final read is different  As we discussed if he develops any headache, nausea, vomiting, change in vision, lethargy, confusion, loss of bowel or bladder or weaknesses like he needs to go directly to the emergency room

## 2021-09-22 ENCOUNTER — TELEPHONE (OUTPATIENT)
Dept: URGENT CARE | Facility: CLINIC | Age: 86
End: 2021-09-22

## 2021-09-22 NOTE — TELEPHONE ENCOUNTER
Attempted to call patient to discuss x-ray results  Unable to reach patient on phone number in chart  Unable to leave voicemail  Attempted to call patient discussed that there is age indeterminate compression fracture and lumbar spine  Will attempt to call again  Will also send message to PCP which he is seeing tomorrow

## 2021-09-23 ENCOUNTER — OFFICE VISIT (OUTPATIENT)
Dept: FAMILY MEDICINE CLINIC | Facility: CLINIC | Age: 86
End: 2021-09-23
Payer: MEDICARE

## 2021-09-23 VITALS
HEART RATE: 68 BPM | TEMPERATURE: 98.7 F | SYSTOLIC BLOOD PRESSURE: 90 MMHG | BODY MASS INDEX: 30.88 KG/M2 | WEIGHT: 174.3 LBS | HEIGHT: 63 IN | DIASTOLIC BLOOD PRESSURE: 58 MMHG | RESPIRATION RATE: 18 BRPM | OXYGEN SATURATION: 97 %

## 2021-09-23 DIAGNOSIS — S00.83XA CONTUSION OF OTHER PART OF HEAD, INITIAL ENCOUNTER: ICD-10-CM

## 2021-09-23 DIAGNOSIS — I10 ESSENTIAL HYPERTENSION: ICD-10-CM
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
## 2021-09-23 DIAGNOSIS — M25.471 ANKLE EDEMA, BILATERAL: ICD-10-CM

## 2021-09-23 DIAGNOSIS — K21.9 GASTROESOPHAGEAL REFLUX DISEASE WITHOUT ESOPHAGITIS: Primary | ICD-10-CM

## 2021-09-23 DIAGNOSIS — M25.472 ANKLE EDEMA, BILATERAL: ICD-10-CM

## 2021-09-23 PROBLEM — S00.93XA CONTUSION OF HEAD: Status: ACTIVE | Noted: 2021-09-23

## 2021-09-23 PROCEDURE — 99215 OFFICE O/P EST HI 40 MIN: CPT | Performed by: FAMILY MEDICINE

## 2021-09-23 RX ORDER — LISINOPRIL 5 MG/1
5 TABLET ORAL DAILY
Qty: 90 TABLET | Refills: 3 | Status: SHIPPED | OUTPATIENT
Start: 2021-09-23 | End: 2021-09-23 | Stop reason: SDUPTHER

## 2021-09-23 RX ORDER — LISINOPRIL 5 MG/1
5 TABLET ORAL DAILY
Qty: 30 TABLET | Refills: 1 | Status: SHIPPED | OUTPATIENT
Start: 2021-09-23 | End: 2021-10-19 | Stop reason: SDUPTHER

## 2021-09-23 NOTE — PROGRESS NOTES
Assessment/Plan:       Problem List Items Addressed This Visit        Digestive    Gastroesophageal reflux disease without esophagitis - Primary      Stable currently continue omeprazole 20 mg            Cardiovascular and Mediastinum    Essential hypertension     Essential hypertension longstanding patient has been on lisinopril hydrochlorothiazide combination and stable for a long time however recently he fell injuring his back possibly hitting his head and injuring his finger requiring stitches  He may have had a brief contusion to his head and concussion and his daughter brought him here today for further evaluation  His blood pressure was low here on presentation and I would like to reduce his antihypertensive medication eliminating the hydrochlorothiazide and cutting lisinopril down to 5 mg and re-evaluate in 1 month         Relevant Medications    lisinopril (ZESTRIL) 5 mg tablet       Other    Ankle edema, bilateral      Longstanding ankle edema weight remains the same not short of breath at this time         Fall      Fall injury with possible head trauma causing contusion or concussion with symptoms of memory loss which are new he has been alert and oriented and independent for many years  Today is broader came in with him to supervise the appointment and discuss the new changes  Additionally it is noted that his blood pressure is significantly low with puts him at risk for additional falls and I would like to discontinue his lisinopril hydrochlorothiazide and give him a low-dose 5 mg tablet and re-evaluate him closely within 2 weeks  He additionally may need further workup with Neurology and I would like him to be more closely monitored  Further discussion with patient's daughter this occurred on her back deck at her home witnessed by her  they have large dogs and as the patient stood up 1 of the dogs jumped up on him and knocked him over he fell backwards    I had a discussion with the patient and his daughter regarding his overall state of health at this time he will not be driving until further workup and follow-up  Regarding his laceration on his 5th finger right hand the sutures are intact in will be re-evaluated and removed next week         Contusion of head     No loss of consciousness or concussion however patient notes mild neurologic change was inability to remember how to make his coffee at times this can be attributed to his low blood pressure is well and I will re-evaluate him at his next visit here he will be monitored with family and not drive                 Subjective:      Patient ID: Tatiana Flynn is a 80 y o  male  Patient presents today after a fall injury causing potentially minor head trauma with contusion no loss of consciousness reported but he has had some mild mental status changes with short-term memory loss and confusion calling his daughter unclear how to make his coffee in the morning she came out to stay with him and visit now brought him to the office today for further clarification and discussion regarding his recent fall injuries  He additionally hurt his back x-ray shows a wedge fracture and he lacerated his finger      The following portions of the patient's history were reviewed and updated as appropriate: allergies, current medications, past family history, past medical history, past social history, past surgical history and problem list     Review of Systems   Constitutional: Negative for chills, fatigue and fever  HENT: Negative for congestion, nosebleeds, rhinorrhea, sinus pressure and sore throat  Eyes: Negative for discharge and redness  Respiratory: Negative for cough and shortness of breath  Cardiovascular: Negative for chest pain, palpitations and leg swelling  Gastrointestinal: Negative for abdominal pain, blood in stool and nausea  Endocrine: Negative for cold intolerance, heat intolerance and polyuria     Genitourinary: Negative for dysuria and frequency  Musculoskeletal: Negative for arthralgias, back pain and myalgias  Skin: Negative for rash  Neurological: Negative for dizziness, weakness and headaches  Hematological: Negative for adenopathy  Psychiatric/Behavioral: Negative for behavioral problems and sleep disturbance  The patient is not nervous/anxious  Objective:      BP 90/58 (BP Location: Left arm, Patient Position: Sitting)   Pulse 68   Temp 98 7 °F (37 1 °C)   Resp 18   Ht 5' 3" (1 6 m)   Wt 79 1 kg (174 lb 4 8 oz)   SpO2 97%   BMI 30 88 kg/m²        Physical Exam  Vitals and nursing note reviewed  Constitutional:       Appearance: Normal appearance  He is well-developed  HENT:      Head: Normocephalic and atraumatic  Right Ear: Tympanic membrane, ear canal and external ear normal       Left Ear: Tympanic membrane, ear canal and external ear normal       Nose: Nose normal       Mouth/Throat:      Mouth: Mucous membranes are moist       Pharynx: Oropharynx is clear  Eyes:      General: No scleral icterus  Extraocular Movements: Extraocular movements intact  Conjunctiva/sclera: Conjunctivae normal       Pupils: Pupils are equal, round, and reactive to light  Neck:      Thyroid: No thyromegaly  Vascular: No JVD  Cardiovascular:      Rate and Rhythm: Normal rate and regular rhythm  Pulses: Normal pulses  Heart sounds: Normal heart sounds  No murmur heard  Pulmonary:      Effort: Pulmonary effort is normal       Breath sounds: Normal breath sounds  No wheezing or rales  Chest:      Chest wall: No tenderness  Abdominal:      General: Bowel sounds are normal  There is no distension  Palpations: Abdomen is soft  There is no mass  Tenderness: There is no abdominal tenderness  There is no guarding or rebound  Musculoskeletal:         General: No tenderness or deformity  Normal range of motion  Cervical back: Normal range of motion and neck supple  Lymphadenopathy:      Cervical: No cervical adenopathy  Skin:     General: Skin is warm and dry  Findings: No erythema or rash  Neurological:      General: No focal deficit present  Mental Status: He is alert and oriented to person, place, and time  Mental status is at baseline  Cranial Nerves: No cranial nerve deficit  Deep Tendon Reflexes: Reflexes are normal and symmetric  Reflexes normal    Psychiatric:         Mood and Affect: Mood normal          Behavior: Behavior normal          Thought Content: Thought content normal          Judgment: Judgment normal           Data:    Laboratory Results: I have personally reviewed the pertinent laboratory results/reports   Radiology/Other Diagnostic Testing Results: I have personally reviewed pertinent reports         Lab Results   Component Value Date    WBC 7 58 07/14/2021    HGB 14 5 07/14/2021    HCT 44 9 07/14/2021    MCV 97 07/14/2021     07/14/2021     Lab Results   Component Value Date    K 3 7 07/14/2021     07/14/2021    CO2 33 (H) 07/14/2021    BUN 7 07/14/2021    CREATININE 0 95 07/14/2021    GLUF 87 07/14/2021    CALCIUM 8 9 07/14/2021    AST 12 07/14/2021    ALT 16 07/14/2021    ALKPHOS 75 07/14/2021    EGFR 68 07/14/2021     Lab Results   Component Value Date    CHOLESTEROL 174 07/14/2021    CHOLESTEROL 177 01/05/2021    CHOLESTEROL 164 07/06/2020     Lab Results   Component Value Date    HDL 57 07/14/2021    HDL 58 01/05/2021    HDL 49 07/06/2020     Lab Results   Component Value Date    LDLCALC 97 07/14/2021    LDLCALC 105 (H) 01/05/2021    LDLCALC 97 07/06/2020     Lab Results   Component Value Date    TRIG 99 07/14/2021    TRIG 69 01/05/2021    TRIG 88 07/06/2020     No results found for: [place], [place]  Lab Results   Component Value Date    [id number] 1 120 07/14/2021     No results found for: HGBA1C  Lab Results   Component Value Date    PSA 5 4 (H) 07/14/2021       Pearl Granados DO

## 2021-09-23 NOTE — ASSESSMENT & PLAN NOTE
Essential hypertension longstanding patient has been on lisinopril hydrochlorothiazide combination and stable for a long time however recently he fell injuring his back possibly hitting his head and injuring his finger requiring stitches  He may have had a brief contusion to his head and concussion and his daughter brought him here today for further evaluation    His blood pressure was low here on presentation and I would like to reduce his antihypertensive medication eliminating the hydrochlorothiazide and cutting lisinopril down to 5 mg and re-evaluate in 1 month

## 2021-09-23 NOTE — ASSESSMENT & PLAN NOTE
Fall injury with possible head trauma causing contusion or concussion with symptoms of memory loss which are new he has been alert and oriented and independent for many years  Today is broader came in with him to supervise the appointment and discuss the new changes  Additionally it is noted that his blood pressure is significantly low with puts him at risk for additional falls and I would like to discontinue his lisinopril hydrochlorothiazide and give him a low-dose 5 mg tablet and re-evaluate him closely within 2 weeks  He additionally may need further workup with Neurology and I would like him to be more closely monitored  Further discussion with patient's daughter this occurred on her back deck at her home witnessed by her  they have large dogs and as the patient stood up 1 of the dogs jumped up on him and knocked him over he fell backwards  I had a discussion with the patient and his daughter regarding his overall state of health at this time he will not be driving until further workup and follow-up    Regarding his laceration on his 5th finger right hand the sutures are intact in will be re-evaluated and removed next week

## 2021-09-23 NOTE — ASSESSMENT & PLAN NOTE
No loss of consciousness or concussion however patient notes mild neurologic change was inability to remember how to make his coffee at times this can be attributed to his low blood pressure is well and I will re-evaluate him at his next visit here he will be monitored with family and not drive

## 2021-09-28 ENCOUNTER — OFFICE VISIT (OUTPATIENT)
Dept: FAMILY MEDICINE CLINIC | Facility: CLINIC | Age: 86
End: 2021-09-28
Payer: MEDICARE

## 2021-09-28 VITALS
SYSTOLIC BLOOD PRESSURE: 122 MMHG | WEIGHT: 171.6 LBS | DIASTOLIC BLOOD PRESSURE: 70 MMHG | HEIGHT: 63 IN | TEMPERATURE: 98.6 F | RESPIRATION RATE: 18 BRPM | OXYGEN SATURATION: 97 % | BODY MASS INDEX: 30.41 KG/M2 | HEART RATE: 68 BPM

## 2021-09-28 DIAGNOSIS — S61.216D LACERATION OF RIGHT LITTLE FINGER WITHOUT FOREIGN BODY WITHOUT DAMAGE TO NAIL, SUBSEQUENT ENCOUNTER: Primary | ICD-10-CM

## 2021-09-28 PROBLEM — S61.216A LACERATION OF RIGHT LITTLE FINGER WITHOUT FOREIGN BODY WITHOUT DAMAGE TO NAIL: Status: ACTIVE | Noted: 2021-09-28

## 2021-09-28 PROCEDURE — 99213 OFFICE O/P EST LOW 20 MIN: CPT | Performed by: FAMILY MEDICINE

## 2021-09-28 NOTE — PROGRESS NOTES
Assessment/Plan:       Problem List Items Addressed This Visit        Other    Laceration of right little finger without foreign body without damage to nail - Primary     Right hand laceration palmar surface of finger healed at this time sutures removed antibiotic ointment applied along with a gauze dressing he will keep this clean at this point and watch for any changes and contact me if needed                 Subjective:      Patient ID: Darion Romano is a 80 y o  male  Patient presents for follow-up evaluation for laceration of right hand 5th finger suture removal and examination of incision      The following portions of the patient's history were reviewed and updated as appropriate: allergies, current medications, past family history, past medical history, past social history, past surgical history and problem list     Review of Systems   Constitutional: Negative for chills, fatigue and fever  HENT: Negative for congestion, nosebleeds, rhinorrhea, sinus pressure and sore throat  Eyes: Negative for discharge and redness  Respiratory: Negative for cough and shortness of breath  Cardiovascular: Negative for chest pain, palpitations and leg swelling  Gastrointestinal: Negative for abdominal pain, blood in stool and nausea  Endocrine: Negative for cold intolerance, heat intolerance and polyuria  Genitourinary: Negative for dysuria and frequency  Musculoskeletal: Negative for arthralgias, back pain and myalgias  Skin: Negative for rash  Neurological: Negative for dizziness, weakness and headaches  Hematological: Negative for adenopathy  Psychiatric/Behavioral: Negative for behavioral problems and sleep disturbance  The patient is not nervous/anxious            Objective:      /70 (BP Location: Left arm, Patient Position: Sitting)   Pulse 68   Temp 98 6 °F (37 °C)   Resp 18   Ht 5' 3" (1 6 m)   Wt 77 8 kg (171 lb 9 6 oz)   SpO2 97%   BMI 30 40 kg/m²        Physical Exam  Vitals and nursing note reviewed  Constitutional:       Appearance: He is well-developed  HENT:      Head: Normocephalic and atraumatic  Right Ear: External ear normal       Left Ear: External ear normal       Nose: Nose normal    Eyes:      General: No scleral icterus  Conjunctiva/sclera: Conjunctivae normal       Pupils: Pupils are equal, round, and reactive to light  Neck:      Thyroid: No thyromegaly  Vascular: No JVD  Cardiovascular:      Rate and Rhythm: Normal rate and regular rhythm  Heart sounds: Normal heart sounds  No murmur heard  Pulmonary:      Effort: Pulmonary effort is normal       Breath sounds: Normal breath sounds  No wheezing or rales  Chest:      Chest wall: No tenderness  Abdominal:      General: Bowel sounds are normal  There is no distension  Palpations: Abdomen is soft  There is no mass  Tenderness: There is no abdominal tenderness  There is no guarding or rebound  Musculoskeletal:         General: No tenderness or deformity  Normal range of motion  Cervical back: Normal range of motion and neck supple  Lymphadenopathy:      Cervical: No cervical adenopathy  Skin:     General: Skin is warm and dry  Findings: No erythema or rash  Comments: Laceration healed right hand 5th finger palmar surface his sutures removed incision intact   Neurological:      Mental Status: He is alert and oriented to person, place, and time  Cranial Nerves: No cranial nerve deficit  Deep Tendon Reflexes: Reflexes are normal and symmetric  Reflexes normal    Psychiatric:         Behavior: Behavior normal          Thought Content:  Thought content normal          Judgment: Judgment normal           Data:    Radiology/Other Diagnostic Testing Results:      Lab Results   Component Value Date    WBC 7 58 07/14/2021    HGB 14 5 07/14/2021    HCT 44 9 07/14/2021    MCV 97 07/14/2021     07/14/2021     Lab Results   Component Value Date K 3 7 07/14/2021     07/14/2021    CO2 33 (H) 07/14/2021    BUN 7 07/14/2021    CREATININE 0 95 07/14/2021    GLUF 87 07/14/2021    CALCIUM 8 9 07/14/2021    AST 12 07/14/2021    ALT 16 07/14/2021    ALKPHOS 75 07/14/2021    EGFR 68 07/14/2021     Lab Results   Component Value Date    CHOLESTEROL 174 07/14/2021    CHOLESTEROL 177 01/05/2021    CHOLESTEROL 164 07/06/2020     Lab Results   Component Value Date    HDL 57 07/14/2021    HDL 58 01/05/2021    HDL 49 07/06/2020     Lab Results   Component Value Date    LDLCALC 97 07/14/2021    LDLCALC 105 (H) 01/05/2021    LDLCALC 97 07/06/2020     Lab Results   Component Value Date    TRIG 99 07/14/2021    TRIG 69 01/05/2021    TRIG 88 07/06/2020     No results found for: [place], [place]  Lab Results   Component Value Date    [id number] 1 120 07/14/2021     No results found for: HGBA1C  Lab Results   Component Value Date    PSA 5 4 (H) 07/14/2021       Sukhdeep Abel DO

## 2021-09-28 NOTE — ASSESSMENT & PLAN NOTE
Right hand laceration palmar surface of finger healed at this time sutures removed antibiotic ointment applied along with a gauze dressing he will keep this clean at this point and watch for any changes and contact me if needed

## 2021-10-19 ENCOUNTER — OFFICE VISIT (OUTPATIENT)
Dept: FAMILY MEDICINE CLINIC | Facility: CLINIC | Age: 86
End: 2021-10-19
Payer: MEDICARE

## 2021-10-19 VITALS
TEMPERATURE: 98.9 F | DIASTOLIC BLOOD PRESSURE: 72 MMHG | RESPIRATION RATE: 22 BRPM | HEIGHT: 63 IN | HEART RATE: 74 BPM | SYSTOLIC BLOOD PRESSURE: 124 MMHG | OXYGEN SATURATION: 98 % | WEIGHT: 171.8 LBS | BODY MASS INDEX: 30.44 KG/M2

## 2021-10-19 DIAGNOSIS — K21.9 GASTROESOPHAGEAL REFLUX DISEASE WITHOUT ESOPHAGITIS: Primary | ICD-10-CM

## 2021-10-19 DIAGNOSIS — R97.20 ELEVATED PSA: ICD-10-CM

## 2021-10-19 DIAGNOSIS — M25.472 ANKLE EDEMA, BILATERAL: ICD-10-CM

## 2021-10-19 DIAGNOSIS — I10 ESSENTIAL HYPERTENSION: ICD-10-CM

## 2021-10-19 DIAGNOSIS — E78.2 MIXED HYPERLIPIDEMIA: ICD-10-CM

## 2021-10-19 DIAGNOSIS — M25.471 ANKLE EDEMA, BILATERAL: ICD-10-CM

## 2021-10-19 PROCEDURE — 99214 OFFICE O/P EST MOD 30 MIN: CPT | Performed by: FAMILY MEDICINE

## 2021-10-19 RX ORDER — LISINOPRIL 5 MG/1
5 TABLET ORAL DAILY
Qty: 90 TABLET | Refills: 1 | Status: SHIPPED | OUTPATIENT
Start: 2021-10-19 | End: 2021-10-19 | Stop reason: SDUPTHER

## 2021-10-19 RX ORDER — LISINOPRIL 5 MG/1
5 TABLET ORAL DAILY
Qty: 15 TABLET | Refills: 1 | Status: SHIPPED | OUTPATIENT
Start: 2021-10-19 | End: 2021-10-28 | Stop reason: SDUPTHER

## 2021-10-28 DIAGNOSIS — E78.2 MIXED HYPERLIPIDEMIA: ICD-10-CM

## 2021-10-28 DIAGNOSIS — I10 ESSENTIAL HYPERTENSION: ICD-10-CM

## 2021-10-28 DIAGNOSIS — K21.9 GASTROESOPHAGEAL REFLUX DISEASE WITHOUT ESOPHAGITIS: ICD-10-CM

## 2021-10-28 RX ORDER — OMEPRAZOLE 20 MG/1
20 CAPSULE, DELAYED RELEASE ORAL DAILY
Qty: 90 CAPSULE | Refills: 2 | Status: SHIPPED | OUTPATIENT
Start: 2021-10-28 | End: 2022-03-18 | Stop reason: SDUPTHER

## 2021-10-28 RX ORDER — ROSUVASTATIN CALCIUM 5 MG/1
5 TABLET, COATED ORAL DAILY
Qty: 90 TABLET | Refills: 2 | Status: SHIPPED | OUTPATIENT
Start: 2021-10-28 | End: 2022-03-18 | Stop reason: SDUPTHER

## 2021-10-28 RX ORDER — LISINOPRIL 5 MG/1
5 TABLET ORAL DAILY
Qty: 90 TABLET | Refills: 1 | Status: SHIPPED | OUTPATIENT
Start: 2021-10-28 | End: 2022-01-06 | Stop reason: SDUPTHER

## 2022-01-06 DIAGNOSIS — I10 ESSENTIAL HYPERTENSION: ICD-10-CM

## 2022-01-06 RX ORDER — LISINOPRIL 5 MG/1
5 TABLET ORAL DAILY
Qty: 90 TABLET | Refills: 1 | Status: SHIPPED | OUTPATIENT
Start: 2022-01-06 | End: 2022-03-18 | Stop reason: SDUPTHER

## 2022-01-18 ENCOUNTER — APPOINTMENT (OUTPATIENT)
Dept: LAB | Facility: CLINIC | Age: 87
End: 2022-01-18
Payer: MEDICARE

## 2022-01-18 DIAGNOSIS — I10 ESSENTIAL HYPERTENSION: ICD-10-CM

## 2022-01-18 LAB
ALBUMIN SERPL BCP-MCNC: 3.8 G/DL (ref 3.5–5)
ALP SERPL-CCNC: 86 U/L (ref 46–116)
ALT SERPL W P-5'-P-CCNC: 17 U/L (ref 12–78)
ANION GAP SERPL CALCULATED.3IONS-SCNC: 5 MMOL/L (ref 4–13)
AST SERPL W P-5'-P-CCNC: 12 U/L (ref 5–45)
BASOPHILS # BLD AUTO: 0.04 THOUSANDS/ΜL (ref 0–0.1)
BASOPHILS NFR BLD AUTO: 1 % (ref 0–1)
BILIRUB SERPL-MCNC: 0.96 MG/DL (ref 0.2–1)
BUN SERPL-MCNC: 15 MG/DL (ref 5–25)
CALCIUM SERPL-MCNC: 8.8 MG/DL (ref 8.3–10.1)
CHLORIDE SERPL-SCNC: 102 MMOL/L (ref 100–108)
CO2 SERPL-SCNC: 30 MMOL/L (ref 21–32)
CREAT SERPL-MCNC: 1.12 MG/DL (ref 0.6–1.3)
EOSINOPHIL # BLD AUTO: 0.45 THOUSAND/ΜL (ref 0–0.61)
EOSINOPHIL NFR BLD AUTO: 6 % (ref 0–6)
ERYTHROCYTE [DISTWIDTH] IN BLOOD BY AUTOMATED COUNT: 13.3 % (ref 11.6–15.1)
GFR SERPL CREATININE-BSD FRML MDRD: 55 ML/MIN/1.73SQ M
GLUCOSE P FAST SERPL-MCNC: 84 MG/DL (ref 65–99)
HCT VFR BLD AUTO: 43.5 % (ref 36.5–49.3)
HGB BLD-MCNC: 14 G/DL (ref 12–17)
IMM GRANULOCYTES # BLD AUTO: 0.02 THOUSAND/UL (ref 0–0.2)
IMM GRANULOCYTES NFR BLD AUTO: 0 % (ref 0–2)
LYMPHOCYTES # BLD AUTO: 1.79 THOUSANDS/ΜL (ref 0.6–4.47)
LYMPHOCYTES NFR BLD AUTO: 23 % (ref 14–44)
MCH RBC QN AUTO: 29.8 PG (ref 26.8–34.3)
MCHC RBC AUTO-ENTMCNC: 32.2 G/DL (ref 31.4–37.4)
MCV RBC AUTO: 93 FL (ref 82–98)
MONOCYTES # BLD AUTO: 0.88 THOUSAND/ΜL (ref 0.17–1.22)
MONOCYTES NFR BLD AUTO: 11 % (ref 4–12)
NEUTROPHILS # BLD AUTO: 4.63 THOUSANDS/ΜL (ref 1.85–7.62)
NEUTS SEG NFR BLD AUTO: 59 % (ref 43–75)
NRBC BLD AUTO-RTO: 0 /100 WBCS
PLATELET # BLD AUTO: 199 THOUSANDS/UL (ref 149–390)
PMV BLD AUTO: 12 FL (ref 8.9–12.7)
POTASSIUM SERPL-SCNC: 4.4 MMOL/L (ref 3.5–5.3)
PROT SERPL-MCNC: 7.5 G/DL (ref 6.4–8.2)
RBC # BLD AUTO: 4.7 MILLION/UL (ref 3.88–5.62)
SODIUM SERPL-SCNC: 137 MMOL/L (ref 136–145)
WBC # BLD AUTO: 7.81 THOUSAND/UL (ref 4.31–10.16)

## 2022-01-18 PROCEDURE — 80053 COMPREHEN METABOLIC PANEL: CPT

## 2022-01-18 PROCEDURE — 36415 COLL VENOUS BLD VENIPUNCTURE: CPT

## 2022-01-18 PROCEDURE — 85025 COMPLETE CBC W/AUTO DIFF WBC: CPT

## 2022-01-20 ENCOUNTER — OFFICE VISIT (OUTPATIENT)
Dept: FAMILY MEDICINE CLINIC | Facility: CLINIC | Age: 87
End: 2022-01-20
Payer: MEDICARE

## 2022-01-20 VITALS
RESPIRATION RATE: 18 BRPM | SYSTOLIC BLOOD PRESSURE: 130 MMHG | HEART RATE: 74 BPM | WEIGHT: 179.6 LBS | BODY MASS INDEX: 31.82 KG/M2 | HEIGHT: 63 IN | OXYGEN SATURATION: 93 % | TEMPERATURE: 98.3 F | DIASTOLIC BLOOD PRESSURE: 70 MMHG
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
## 2022-01-20 DIAGNOSIS — M19.011 ARTHRITIS OF RIGHT SHOULDER REGION: ICD-10-CM

## 2022-01-20 DIAGNOSIS — K21.9 GASTROESOPHAGEAL REFLUX DISEASE WITHOUT ESOPHAGITIS: ICD-10-CM

## 2022-01-20 DIAGNOSIS — E78.2 MIXED HYPERLIPIDEMIA: ICD-10-CM

## 2022-01-20 DIAGNOSIS — I10 ESSENTIAL HYPERTENSION: Primary | ICD-10-CM

## 2022-01-20 PROCEDURE — 99214 OFFICE O/P EST MOD 30 MIN: CPT | Performed by: FAMILY MEDICINE

## 2022-01-20 NOTE — ASSESSMENT & PLAN NOTE
Bilateral shoulder pain patient injured his left shoulder falling the other day but is doing well as it heals continuing to exercise resting his shoulder for now

## 2022-01-20 NOTE — PROGRESS NOTES
BMI Counseling: Body mass index is 31 81 kg/m²  The BMI is above normal  Nutrition recommendations include reducing portion sizes, 3-5 servings of fruits/vegetables daily, reducing fast food intake, decreasing soda and/or juice intake, moderation in carbohydrate intake, increasing intake of lean protein, reducing intake of saturated fat and trans fat and reducing intake of cholesterol  Exercise recommendations include exercising 3-5 times per week

## 2022-01-20 NOTE — PATIENT INSTRUCTIONS
Physical Activity for Older Adults   AMBULATORY CARE:   What you need to know about physical activity and aging:  Physical activity can help you get or stay physically and mentally healthy as you age  You may be able to do your daily activities more easily  If you have arthritis, your joints may move more easily and with less pain  Your bones and muscles will get stronger  Strength and better balance help prevent osteoporosis and reduce your risk for falls  Regular activity can improve your mood and appetite, and help you sleep better  Your risk for diseases such as cancer, heart disease, diabetes, and stroke will be lower  Activity can help you control your blood pressure and blood sugar levels, and lower your cholesterol  Activity can also slow or prevent cognitive (thinking) problems as you age  Call your doctor or physical therapist if:   · You have pain with any physical activity  · You have questions or concerns about physical activity or your health  Physical activity safety:   · Talk to your healthcare provider before you start doing physical activities  Your provider will check your general health  He or she may recommend you avoid certain activities based on your health  He or she will also make sure your physical activity plan works with any medicines you are taking  Some medicines can make you sleepy or cause balance problems  These can make certain physical activities less safe  · Start slowly and work up to more activity  It is important not to become highly active too quickly  Your body will need time to adjust  For example, you can cause serious injury if you try to lift a weight that is too heavy  Your healthcare provider can help you create a plan  The plan may start with a few minutes of physical activity on certain days of the week  You can add activities slowly over time  This may include making sessions longer, or being active on more days of the week   You can also add more weight as you get stronger  · Do a variety of activities throughout the week  Do conditioning, strength training, flexibility, and balance activities  General guidelines are included below for how much of each activity to get  Your healthcare provider or physical therapist may give you specific instructions to follow  · Stretch before and after you do any conditioning or strengthening activities  You can also do stretching activities on days you do not do any other kind of activity  · Move slowly and smoothly  Avoid fast or jerky motions to help prevent an injury  · Do exercises and stretches on both sides  This helps the muscles on both sides remain strong and flexible  · Stop if you feel sharp pain or an increase in pain  Stop the exercise and contact your healthcare provider if you have these symptoms  It is normal to feel some discomfort, such as a dull ache, during exercise  Regular exercise will help decrease your discomfort over time  · Drink liquid before, during, and after activity  Liquid helps prevent dehydration during exercise  Dehydration can cause you to become dizzy or to fall  Liquids are especially important on hot days  Activities that help improve flexibility:  You can improve your flexibility by doing stretching activities  Only stretch far enough that you feel the stretch but do not feel pain  Hold the stretch for 30 to 60 seconds, or for as long as directed  Repeat the stretch 2 or 3 times before you move to the next body area  Breathe normally  Do not hold your breath  It is important to breathe in and out so you do not tense up during exercise  Tension could prevent your muscles from stretching  The following are examples of flexibility activities:  · Crossover arm stretch:  Relax your shoulders  Hold your upper arm with the opposite hand  Pull your arm across your chest until you feel a stretch  Hold the stretch for 30 seconds  Return to the starting position  · Back stretch:  Lie on your back with your hands behind your head  Bend your knees and turn the lower half of your body to one side  Hold this position for 10 seconds  Repeat 3 times on each side  · Hip stretch:  Lie on the ground  Place both hands on the shin of 1 leg  Pull your knee toward your chest  Repeat on the other side  · Standing quadriceps stretch:  Stand and place one hand against a wall or hold the back of a chair for balance  With your weight on one leg, bend your other leg and grab your ankle  Pull your heel toward your buttocks  · Standing hamstring stretch:  Stand with your feet hips distance apart  Life 1 leg and rest it on a firm surface, such as a table or chair  Keep your toes pointing up  Slide your hands forward along the side of your leg until you feel a stretch  Keep your chest lifted and your back straight  Hold for 30 seconds  Activities that help improve balance:  Good balance can help you prevent falls  Do balance activities a few times each week  You might want to ask someone to help you while you do these activities  The person can help make sure you do not fall  Make sure your path or activity area is clear of objects before you begin  The following are examples of activities that help improve balance:  · Balance on 1 foot:  Hold something sturdy, such as a wall, chair, or walker  Balance your weight on both feet first  Then slowly lift 1 foot off the ground  When you have your balance, you can try letting go of the sturdy object  Make sure it is available for you to hold again if needed  Try to stand on 1 foot for about 10 seconds  Then repeat on the other foot  · Walking in a straight line:  Put your weight equally on both feet to start  Make sure you have your balance  Then walk forward slowly, putting 1 foot directly in front of the other  · Walking backward: If no one is available to stand next to you, stand next to a wall  Lean against the wall, and keep your arm on the wall as you walk  Make sure you have your balance  Then walk backward  Go slowly  Make sure you have good balance before you take the next step back  · Usman Chi:  Usman Chi combines slow, precise movements with deep breathing and meditation  Classes may be available  An instructor will show you how to do the moves  Activities that help improve conditioning:  Conditioning includes activities that increase your heart rate and breathing  Activity that uses body weight is called weightbearing exercise  Examples include walking, dancing, and raking leaves  Weightbearing activities help strengthen bones  Nonweightbearing activities include riding a bicycle and swimming  Aim for 150 to 300 minutes (2 5 to 5 hours) of moderate aerobic activity each week  The following are examples of activities that help improve conditioning:  · Walking:  Walking is a good, low-impact exercise  If you are not able to go outdoors, you can still walk in your home  Make sure the walking path in your home is clear and has good lighting  You can also march in place  · Chair exercises:  Chair exercises are a safe way to move if you have balance problems  Sit in a hard chair that has a back  Keep your feet on the ground when you work your arms  Hold the seat or arms of the chair when you work your legs  You can lift weights, use a resistance band, hold an object such as a soup can, or just move your arms and legs  Chair exercise group classes may also be available  An instructor shows you moves to do while you sit in the chair  · Water aerobics:  Water creates resistance  This means it is harder to move in water than it is on dry ground  Water aerobics can help prevent falls while you exercise  You will raise your heart rate and breathing just by walking along the bottom of the pool through the water  You can also  place and work your upper body   Hold pool equipment such as a flotation device or pool noodle to add weight to your activity  Water aerobics group classes may also be available  An instructor shows you moves to do in the water  Activities that help build strength:  Strength training helps you keep the muscles you have and build new muscles  Strong muscles help protect your bones  You can also improve your balance by strengthening your leg, back, and core (abdomen) muscles  If you do not have wights or resistance bands, you can use household items, such as soup cans  You can stand or sit in a chair or wheelchair when you do strength training  Try to work all the major muscle groups, such as your legs, arms, abdomen, and chest  Do strength training at least 2 times each week  Spread the days out so you are not doing strength training 2 days in a row  You can cause injury if you do not rest muscles in between sessions  The following are examples of strength training exercises:  · Weightlifting:  Start with a weight you can lift easily  You can work up to [employer]  Do not try to lift heavy weights right away  You might cause a muscle or tendon injury  Repeat each move until you cannot do it even 1 more time  That is 1 set  Do 2 or 3 sets on each area  · Resistance training: The ends of a resistance band can be held in your hands  You can tie 1 end to a sturdy object  Repeat each move until you cannot do it even 1 more time  That is 1 set  Do 2 or 3 sets on each area  · Other activities:  Work such as digging and shoveling can strengthen muscles  Exercises such as push-ups, sit-ups, or squats can also build muscles  Tips to help you stay on track:   · Start slowly and work up  You do not have to do all the activity at one time  You can do a few minutes at a time  Remember that some physical activity is better than none  Stand up during the day, even if you cannot walk around  Your body uses more energy when you stand   When you do conditioning activities, aim for a speed that is challenging but not too difficult  You should be able to speak a few words at a time but not be able to sing  · Plan activities you enjoy  Do a variety of activities so you stay challenged  You do not have to do regular exercises to be active  Walk outdoors, go shopping, or visit a museum  The more you enjoy your activities, the easier it will be to continue doing them  · Ask for support from the people in your life  Go for a walk after dinner with your family  Meet friends at the park  Find someone who likes the same classes you like  Make plans to attend class together  You may be more likely to go if you know another person is counting on you to be there  Get involved in community events, such as cleaning a community park  Ask someone to help you stay on track  For example, you can tell the person about your daily or weekly activity  What you need to know about nutrition and activity:  Healthy foods will give you the energy you need to be active  Activity and good nutrition work together to help you reach or maintain a healthy weight  Healthy foods include fruits, vegetables, lean meats, fish, cooked beans, whole-grain breads, and low-fat dairy products  Your healthcare provider can help you create a healthy meal plan  He or she can tell you how many calories you need to stay active and still lose weight if needed  Follow up with your doctor or physical therapist as directed:  Write down your questions so you remember to ask them during your visits  © [employer] 2021 Information is for End User's use only and may not be sold, redistributed or otherwise used for commercial purposes  All illustrations and images included in CareNotes® are the copyrighted property of [employer]  or [facility]   The above information is an  only  It is not intended as medical advice for individual conditions or treatments   Talk to your doctor, nurse or pharmacist before following any medical regimen to see if it is safe and effective for you

## 2022-01-20 NOTE — PROGRESS NOTES
Assessment/Plan:       Problem List Items Addressed This Visit        Digestive    Gastroesophageal reflux disease without esophagitis     GERD symptoms stable continue omeprazole daily            Cardiovascular and Mediastinum    Essential hypertension - Primary     Hypertension under stable control continue with current medications without change            Musculoskeletal and Integument    Arthritis of right shoulder region     Bilateral shoulder pain patient injured his left shoulder falling the other day but is doing well as it heals continuing to exercise resting his shoulder for now            Other    Mixed hyperlipidemia     Mixed hyperlipidemia stable continue to monitor blood work and continue with Crestor         Fall     One time fall while exercising no other balance issues continue to exercise                 Subjective:      Patient ID: Madiha Osman is a 80 y o  male  Patient presents for a general checkup today doing well three-month evaluation overall no complaints review of laboratory work at this time notes that he is more tired at times recently stopped exercising because he fell 1 time off doing his exercises  The following portions of the patient's history were reviewed and updated as appropriate: allergies, current medications, past family history, past medical history, past social history, past surgical history and problem list     Review of Systems   Constitutional: Negative for chills, fatigue and fever  HENT: Negative for congestion, nosebleeds, rhinorrhea, sinus pressure and sore throat  Eyes: Negative for discharge and redness  Respiratory: Negative for cough and shortness of breath  Cardiovascular: Negative for chest pain, palpitations and leg swelling  Gastrointestinal: Negative for abdominal pain, blood in stool and nausea  Endocrine: Negative for cold intolerance, heat intolerance and polyuria  Genitourinary: Negative for dysuria and frequency  Musculoskeletal: Positive for arthralgias  Negative for back pain and myalgias  Skin: Negative for rash  Neurological: Negative for dizziness, weakness and headaches  Hematological: Negative for adenopathy  Psychiatric/Behavioral: Negative for behavioral problems and sleep disturbance  The patient is not nervous/anxious  Objective:      /70 (BP Location: Left arm, Patient Position: Sitting)   Pulse 74   Temp 98 3 °F (36 8 °C)   Resp 18   Ht 5' 3" (1 6 m)   Wt 81 5 kg (179 lb 9 6 oz)   SpO2 93%   BMI 31 81 kg/m²        Physical Exam  Vitals and nursing note reviewed  Constitutional:       Appearance: Normal appearance  He is well-developed and normal weight  HENT:      Head: Normocephalic and atraumatic  Right Ear: Tympanic membrane and external ear normal       Left Ear: Tympanic membrane and external ear normal       Nose: Nose normal       Mouth/Throat:      Mouth: Mucous membranes are moist       Pharynx: Oropharynx is clear  Eyes:      General: No scleral icterus  Extraocular Movements: Extraocular movements intact  Conjunctiva/sclera: Conjunctivae normal       Pupils: Pupils are equal, round, and reactive to light  Neck:      Thyroid: No thyromegaly  Vascular: No JVD  Cardiovascular:      Rate and Rhythm: Normal rate and regular rhythm  Pulses: Normal pulses  Heart sounds: Normal heart sounds  No murmur heard  Pulmonary:      Effort: Pulmonary effort is normal       Breath sounds: Normal breath sounds  No wheezing or rales  Chest:      Chest wall: No tenderness  Abdominal:      General: Bowel sounds are normal  There is no distension  Palpations: Abdomen is soft  There is no mass  Tenderness: There is no abdominal tenderness  There is no guarding or rebound  Musculoskeletal:         General: No tenderness or deformity  Normal range of motion  Cervical back: Normal range of motion and neck supple     Lymphadenopathy: Cervical: No cervical adenopathy  Skin:     General: Skin is warm and dry  Capillary Refill: Capillary refill takes less than 2 seconds  Findings: No erythema or rash  Neurological:      General: No focal deficit present  Mental Status: He is alert and oriented to person, place, and time  Mental status is at baseline  Cranial Nerves: No cranial nerve deficit  Deep Tendon Reflexes: Reflexes are normal and symmetric  Reflexes normal    Psychiatric:         Mood and Affect: Mood normal          Behavior: Behavior normal          Thought Content: Thought content normal          Judgment: Judgment normal           Data:    Laboratory Results: I have personally reviewed the pertinent laboratory results/reports   Radiology/Other Diagnostic Testing Results: I have personally reviewed pertinent reports         Lab Results   Component Value Date    WBC 7 81 01/18/2022    HGB 14 0 01/18/2022    HCT 43 5 01/18/2022    MCV 93 01/18/2022     01/18/2022     Lab Results   Component Value Date    K 4 4 01/18/2022     01/18/2022    CO2 30 01/18/2022    BUN 15 01/18/2022    CREATININE 1 12 01/18/2022    GLUF 84 01/18/2022    CALCIUM 8 8 01/18/2022    AST 12 01/18/2022    ALT 17 01/18/2022    ALKPHOS 86 01/18/2022    EGFR 55 01/18/2022     Lab Results   Component Value Date    CHOLESTEROL 174 07/14/2021    CHOLESTEROL 177 01/05/2021    CHOLESTEROL 164 07/06/2020     Lab Results   Component Value Date    HDL 57 07/14/2021    HDL 58 01/05/2021    HDL 49 07/06/2020     Lab Results   Component Value Date    LDLCALC 97 07/14/2021    LDLCALC 105 (H) 01/05/2021    LDLCALC 97 07/06/2020     Lab Results   Component Value Date    TRIG 99 07/14/2021    TRIG 69 01/05/2021    TRIG 88 07/06/2020     No results found for: [place], [place]  Lab Results   Component Value Date    IJZ1RRYBWMPK 1 120 07/14/2021     No results found for: HGBA1C  Lab Results   Component Value Date    PSA 5 4 (H) 07/14/2021       [facility] Brian, DO

## 2022-03-18 DIAGNOSIS — K21.9 GASTROESOPHAGEAL REFLUX DISEASE WITHOUT ESOPHAGITIS: ICD-10-CM

## 2022-03-18 DIAGNOSIS — I10 ESSENTIAL HYPERTENSION: ICD-10-CM

## 2022-03-18 DIAGNOSIS — E78.2 MIXED HYPERLIPIDEMIA: ICD-10-CM

## 2022-03-18 RX ORDER — ROSUVASTATIN CALCIUM 5 MG/1
5 TABLET, COATED ORAL DAILY
Qty: 90 TABLET | Refills: 2 | Status: SHIPPED | OUTPATIENT
Start: 2022-03-18 | End: 2022-06-14 | Stop reason: SDUPTHER

## 2022-03-18 RX ORDER — OMEPRAZOLE 20 MG/1
20 CAPSULE, DELAYED RELEASE ORAL DAILY
Qty: 90 CAPSULE | Refills: 2 | Status: SHIPPED | OUTPATIENT
Start: 2022-03-18 | End: 2022-06-14 | Stop reason: SDUPTHER

## 2022-03-18 RX ORDER — LISINOPRIL 5 MG/1
5 TABLET ORAL DAILY
Qty: 90 TABLET | Refills: 2 | Status: SHIPPED | OUTPATIENT
Start: 2022-03-18 | End: 2022-06-14 | Stop reason: SDUPTHER

## 2022-04-19 ENCOUNTER — RA CDI HCC (OUTPATIENT)
Dept: OTHER | Facility: HOSPITAL | Age: 87
End: 2022-04-19

## 2022-04-19 NOTE — PROGRESS NOTES
[address]  coding opportunities       Chart reviewed, no opportunity found: CHART REVIEWED, NO OPPORTUNITY FOUND        Patients Insurance     Medicare Insurance: Medicare

## 2022-04-21 ENCOUNTER — OFFICE VISIT (OUTPATIENT)
Dept: FAMILY MEDICINE CLINIC | Facility: CLINIC | Age: 87
End: 2022-04-21
Payer: MEDICARE

## 2022-04-21 VITALS
SYSTOLIC BLOOD PRESSURE: 120 MMHG | HEIGHT: 63 IN | BODY MASS INDEX: 31.96 KG/M2 | HEART RATE: 76 BPM | DIASTOLIC BLOOD PRESSURE: 70 MMHG | OXYGEN SATURATION: 93 % | RESPIRATION RATE: 18 BRPM | TEMPERATURE: 98.5 F | WEIGHT: 180.4 LBS

## 2022-04-21 DIAGNOSIS — M25.471 ANKLE EDEMA, BILATERAL: ICD-10-CM

## 2022-04-21 DIAGNOSIS — M25.472 ANKLE EDEMA, BILATERAL: ICD-10-CM

## 2022-04-21 DIAGNOSIS — K21.9 GASTROESOPHAGEAL REFLUX DISEASE WITHOUT ESOPHAGITIS: Primary | ICD-10-CM

## 2022-04-21 DIAGNOSIS — E78.2 MIXED HYPERLIPIDEMIA: ICD-10-CM

## 2022-04-21 DIAGNOSIS — M19.012 ARTHRITIS OF LEFT SHOULDER REGION: ICD-10-CM

## 2022-04-21 DIAGNOSIS — I10 ESSENTIAL HYPERTENSION: ICD-10-CM

## 2022-04-21 PROCEDURE — 99214 OFFICE O/P EST MOD 30 MIN: CPT | Performed by: FAMILY MEDICINE

## 2022-04-21 NOTE — ASSESSMENT & PLAN NOTE
Patient will continue with home stretching exercise program and take Motrin p r n   Refer for physical therapy if symptoms change or worsen

## 2022-04-21 NOTE — ASSESSMENT & PLAN NOTE
Stable reviewed laboratory work continue Crestor 5 mg FMH:  3 y/o brother: Healthy  4 y/o sister: 13 months old dx with intussusception.   Mother: H/o 3 C-sections  Father: H/o knee surgery  MGM: HTN  MGF: Unknown  PGM: DM  PGF: DM Vaccines UTD.  Denies any vaccines in the past 14 days.  Informed parents that pt. is not to receive any vaccines for 7 days after dos.

## 2022-04-21 NOTE — ASSESSMENT & PLAN NOTE
GERD symptoms stable with omeprazole patient takes Motrin periodically for arthritis pains most recently left shoulder pain

## 2022-04-21 NOTE — ASSESSMENT & PLAN NOTE
Recommend compression socks patient will attempt to find these and use these and re-evaluate at next office visit discussed need for this rather than water pills to prevent kidney damage

## 2022-04-21 NOTE — PROGRESS NOTES
Assessment/Plan:       Problem List Items Addressed This Visit        Digestive    Gastroesophageal reflux disease without esophagitis - Primary     GERD symptoms stable with omeprazole patient takes Motrin periodically for arthritis pains most recently left shoulder pain            Cardiovascular and Mediastinum    Essential hypertension     Hypertension stable control continuing on current lisinopril regimen            Musculoskeletal and Integument    Arthritis of left shoulder region     Patient will continue with home stretching exercise program and take Motrin p r n  Refer for physical therapy if symptoms change or worsen            Other    Mixed hyperlipidemia     Stable reviewed laboratory work continue Crestor 5 mg         Ankle edema, bilateral     Recommend compression socks patient will attempt to find these and use these and re-evaluate at next office visit discussed need for this rather than water pills to prevent kidney damage                 Subjective:      Patient ID: Aryan Tobias is a 80 y o  male  Patient presents for general checkup and complains of left shoulder pain      The following portions of the patient's history were reviewed and updated as appropriate: allergies, current medications, past family history, past medical history, past social history, past surgical history and problem list     Review of Systems   Constitutional: Negative for chills, fatigue and fever  HENT: Negative for congestion, nosebleeds, rhinorrhea, sinus pressure and sore throat  Eyes: Negative for discharge and redness  Respiratory: Negative for cough and shortness of breath  Cardiovascular: Negative for chest pain, palpitations and leg swelling  Gastrointestinal: Negative for abdominal pain, blood in stool and nausea  Endocrine: Negative for cold intolerance, heat intolerance and polyuria  Genitourinary: Negative for dysuria and frequency  Musculoskeletal: Positive for arthralgias   Negative for back pain and myalgias  Left shoulder pain   Skin: Negative for rash  Neurological: Negative for dizziness, weakness and headaches  Hematological: Negative for adenopathy  Psychiatric/Behavioral: Negative for behavioral problems and sleep disturbance  The patient is not nervous/anxious  Objective:      /70 (BP Location: Left arm, Patient Position: Sitting)   Pulse 76   Temp 98 5 °F (36 9 °C)   Resp 18   Ht 5' 3" (1 6 m)   Wt 81 8 kg (180 lb 6 4 oz)   SpO2 93%   BMI 31 96 kg/m²        Physical Exam  Vitals and nursing note reviewed  Constitutional:       Appearance: Normal appearance  He is well-developed and normal weight  HENT:      Head: Normocephalic and atraumatic  Right Ear: Tympanic membrane, ear canal and external ear normal       Left Ear: Tympanic membrane, ear canal and external ear normal       Nose: Nose normal       Mouth/Throat:      Mouth: Mucous membranes are moist       Pharynx: Oropharynx is clear  Eyes:      General: No scleral icterus  Extraocular Movements: Extraocular movements intact  Conjunctiva/sclera: Conjunctivae normal       Pupils: Pupils are equal, round, and reactive to light  Neck:      Thyroid: No thyromegaly  Vascular: No JVD  Cardiovascular:      Rate and Rhythm: Normal rate and regular rhythm  Pulses: Normal pulses  Heart sounds: Normal heart sounds  No murmur heard  Pulmonary:      Effort: Pulmonary effort is normal       Breath sounds: Normal breath sounds  No wheezing or rales  Chest:      Chest wall: No tenderness  Abdominal:      General: Bowel sounds are normal  There is no distension  Palpations: Abdomen is soft  There is no mass  Tenderness: There is no abdominal tenderness  There is no guarding or rebound  Musculoskeletal:         General: No tenderness or deformity  Normal range of motion  Cervical back: Normal range of motion and neck supple     Lymphadenopathy: Cervical: No cervical adenopathy  Skin:     General: Skin is warm and dry  Capillary Refill: Capillary refill takes less than 2 seconds  Findings: No erythema or rash  Neurological:      General: No focal deficit present  Mental Status: He is alert and oriented to person, place, and time  Mental status is at baseline  Cranial Nerves: No cranial nerve deficit  Deep Tendon Reflexes: Reflexes are normal and symmetric  Reflexes normal    Psychiatric:         Mood and Affect: Mood normal          Behavior: Behavior normal          Thought Content: Thought content normal          Judgment: Judgment normal           Data:    Laboratory Results: I have personally reviewed the pertinent laboratory results/reports   Radiology/Other Diagnostic Testing Results: I have personally reviewed pertinent reports         Lab Results   Component Value Date    WBC 7 81 01/18/2022    HGB 14 0 01/18/2022    HCT 43 5 01/18/2022    MCV 93 01/18/2022     01/18/2022     Lab Results   Component Value Date    K 4 4 01/18/2022     01/18/2022    CO2 30 01/18/2022    BUN 15 01/18/2022    CREATININE 1 12 01/18/2022    GLUF 84 01/18/2022    CALCIUM 8 8 01/18/2022    AST 12 01/18/2022    ALT 17 01/18/2022    ALKPHOS 86 01/18/2022    EGFR 55 01/18/2022     Lab Results   Component Value Date    CHOLESTEROL 174 07/14/2021    CHOLESTEROL 177 01/05/2021    CHOLESTEROL 164 07/06/2020     Lab Results   Component Value Date    HDL 57 07/14/2021    HDL 58 01/05/2021    HDL 49 07/06/2020     Lab Results   Component Value Date    LDLCALC 97 07/14/2021    LDLCALC 105 (H) 01/05/2021    LDLCALC 97 07/06/2020     Lab Results   Component Value Date    TRIG 99 07/14/2021    TRIG 69 01/05/2021    TRIG 88 07/06/2020     No results found for: [place], [place]  Lab Results   Component Value Date    [id number] 1 120 07/14/2021     No results found for: HGBA1C  Lab Results   Component Value Date    PSA 5 4 (H) 07/14/2021       Sandro Tafoya Brian, DO

## 2022-06-14 DIAGNOSIS — E78.2 MIXED HYPERLIPIDEMIA: ICD-10-CM

## 2022-06-14 DIAGNOSIS — K21.9 GASTROESOPHAGEAL REFLUX DISEASE WITHOUT ESOPHAGITIS: ICD-10-CM

## 2022-06-14 DIAGNOSIS — I10 ESSENTIAL HYPERTENSION: ICD-10-CM

## 2022-06-14 RX ORDER — LISINOPRIL 5 MG/1
5 TABLET ORAL DAILY
Qty: 90 TABLET | Refills: 2 | Status: SHIPPED | OUTPATIENT
Start: 2022-06-14

## 2022-06-14 RX ORDER — OMEPRAZOLE 20 MG/1
20 CAPSULE, DELAYED RELEASE ORAL DAILY
Qty: 90 CAPSULE | Refills: 2 | Status: SHIPPED | OUTPATIENT
Start: 2022-06-14

## 2022-06-14 RX ORDER — ROSUVASTATIN CALCIUM 5 MG/1
5 TABLET, COATED ORAL DAILY
Qty: 90 TABLET | Refills: 2 | Status: SHIPPED | OUTPATIENT
Start: 2022-06-14

## 2022-07-11 NOTE — ASSESSMENT & PLAN NOTE
Essential hypertension under good control continue with the same medication lisinopril hydrochlorothiazide blood pressure stable at 1 20/70 no change in dosage at this time avoid sodium in the diet continue to remain active with daily physical activity in light of his advanced age she understands that he needs to contact me for any change in his overall physical well being Detail Level: Simple Detail Level: Detailed

## 2022-07-19 ENCOUNTER — OFFICE VISIT (OUTPATIENT)
Dept: FAMILY MEDICINE CLINIC | Facility: CLINIC | Age: 87
End: 2022-07-19
Payer: MEDICARE

## 2022-07-19 VITALS
TEMPERATURE: 98.9 F | SYSTOLIC BLOOD PRESSURE: 140 MMHG | WEIGHT: 177.4 LBS | OXYGEN SATURATION: 96 % | HEART RATE: 64 BPM | RESPIRATION RATE: 22 BRPM | HEIGHT: 63 IN | BODY MASS INDEX: 31.43 KG/M2 | DIASTOLIC BLOOD PRESSURE: 80 MMHG

## 2022-07-19 DIAGNOSIS — N18.31 STAGE 3A CHRONIC KIDNEY DISEASE (HCC): ICD-10-CM

## 2022-07-19 DIAGNOSIS — Z00.00 MEDICARE ANNUAL WELLNESS VISIT, SUBSEQUENT: ICD-10-CM

## 2022-07-19 DIAGNOSIS — I10 ESSENTIAL HYPERTENSION: Primary | ICD-10-CM

## 2022-07-19 DIAGNOSIS — M15.9 OSTEOARTHRITIS, GENERALIZED: ICD-10-CM

## 2022-07-19 DIAGNOSIS — H61.23 BILATERAL HEARING LOSS DUE TO CERUMEN IMPACTION: ICD-10-CM

## 2022-07-19 DIAGNOSIS — E78.2 MIXED HYPERLIPIDEMIA: ICD-10-CM

## 2022-07-19 DIAGNOSIS — K21.9 GASTROESOPHAGEAL REFLUX DISEASE WITHOUT ESOPHAGITIS: ICD-10-CM

## 2022-07-19 DIAGNOSIS — R97.20 ELEVATED PSA: ICD-10-CM

## 2022-07-19 PROCEDURE — 99214 OFFICE O/P EST MOD 30 MIN: CPT | Performed by: FAMILY MEDICINE

## 2022-07-19 PROCEDURE — G0439 PPPS, SUBSEQ VISIT: HCPCS | Performed by: FAMILY MEDICINE

## 2022-07-19 PROCEDURE — 69209 REMOVE IMPACTED EAR WAX UNI: CPT | Performed by: FAMILY MEDICINE

## 2022-07-19 NOTE — ASSESSMENT & PLAN NOTE
Longstanding elevated PSA from benign prostatic enlargement if patient notes urinary changes or other status change in his bladder then will repeat levels but otherwise not necessary at this age and time

## 2022-07-19 NOTE — ASSESSMENT & PLAN NOTE
Diffuse generalized osteoarthritis with occasional back pain which patient is tolerable of and he has been stable now he exercises regularly he does stretching exercises every day and I encouraged him to continue to remain active

## 2022-07-19 NOTE — ASSESSMENT & PLAN NOTE
GERD symptoms are stable continuing with omeprazole 20 mg patient notes that his diet has been the same he is not seeing any indigestion blood in stool or heartburn I recommend not eating within 2 hours of bedtime keeping up with plenty of fluids and follow-up with me as scheduled at his next office visit in 3 months

## 2022-07-19 NOTE — PROGRESS NOTES
Assessment and Plan:     Problem List Items Addressed This Visit        Digestive    Gastroesophageal reflux disease without esophagitis     GERD symptoms are stable continuing with omeprazole 20 mg patient notes that his diet has been the same he is not seeing any indigestion blood in stool or heartburn I recommend not eating within 2 hours of bedtime keeping up with plenty of fluids and follow-up with me as scheduled at his next office visit in 3 months            Cardiovascular and Mediastinum    Essential hypertension - Primary     Hypertension under stable control continuing with lisinopril 5 mg daily            Nervous and Auditory    Bilateral hearing loss due to cerumen impaction     Hearing loss over the last several days            Musculoskeletal and Integument    Osteoarthritis, generalized     Diffuse generalized osteoarthritis with occasional back pain which patient is tolerable of and he has been stable now he exercises regularly he does stretching exercises every day and I encouraged him to continue to remain active            Other    Mixed hyperlipidemia     Longstanding mixed hyperlipidemia has been stable with Crestor 5 mg tablets continue this daily  Monitor laboratory work every 6 months         Medicare annual wellness visit, subsequent    Elevated PSA     Longstanding elevated PSA from benign prostatic enlargement if patient notes urinary changes or other status change in his bladder then will repeat levels but otherwise not necessary at this age and time                Preventive health issues were discussed with patient, and age appropriate screening tests were ordered as noted in patient's After Visit Summary  Personalized health advice and appropriate referrals for health education or preventive services given if needed, as noted in patient's After Visit Summary       History of Present Illness:     Patient presents for a Medicare Wellness Visit    This patient is here today for his Medicare wellness visit and general checkup regarding lab work and overall health concerns he is independent and remains well overall  Patient notes hearing loss especially the right ear concerned about wax buildup     Patient Care Team:  Denise Shaw DO as PCP - General (Family Medicine)     Review of Systems:     Review of Systems   Constitutional: Negative for chills, fatigue and fever  HENT: Positive for hearing loss  Negative for congestion, nosebleeds, rhinorrhea, sinus pressure and sore throat  Eyes: Negative for discharge and redness  Respiratory: Negative for cough and shortness of breath  Cardiovascular: Positive for leg swelling  Negative for chest pain and palpitations  Gastrointestinal: Negative for abdominal pain, blood in stool and nausea  Endocrine: Negative for cold intolerance, heat intolerance and polyuria  Genitourinary: Negative for dysuria and frequency  Musculoskeletal: Positive for back pain  Negative for arthralgias and myalgias  Skin: Negative for rash  Neurological: Negative for dizziness, weakness and headaches  Hematological: Negative for adenopathy  Psychiatric/Behavioral: Negative for behavioral problems and sleep disturbance  The patient is not nervous/anxious           Problem List:     Patient Active Problem List   Diagnosis    Essential hypertension    Mixed hyperlipidemia    Gastroesophageal reflux disease without esophagitis    Medicare annual wellness visit, subsequent    Elevated PSA    Ankle edema, bilateral    Acute bilateral low back pain without sciatica    Arthritis of right shoulder region    Fall    Contusion of head    Laceration of right little finger without foreign body without damage to nail    Arthritis of left shoulder region    Osteoarthritis, generalized    Bilateral hearing loss due to cerumen impaction      Past Medical and Surgical History:     Past Medical History:   Diagnosis Date    Elevated cholesterol     GERD (gastroesophageal reflux disease)     Hypertension     Sleep apnea      Past Surgical History:   Procedure Laterality Date    APPENDECTOMY        Family History:     Family History   Problem Relation Age of Onset    No Known Problems Mother     No Known Problems Father       Social History:     Social History     Socioeconomic History    Marital status: /Civil Union     Spouse name: None    Number of children: None    Years of education: None    Highest education level: None   Occupational History    None   Tobacco Use    Smoking status: Former Smoker     Packs/day: 0 25     Years: 10 00     Pack years: 2 50     Types: Cigarettes     Quit date:      Years since quittin 5    Smokeless tobacco: Never Used   Vaping Use    Vaping Use: Never used   Substance and Sexual Activity    Alcohol use: Yes     Comment: glass of wine daily    Drug use: No    Sexual activity: None   Other Topics Concern    None   Social History Narrative    None     Social Determinants of Health     Financial Resource Strain: Not on file   Food Insecurity: Not on file   Transportation Needs: Not on file   Physical Activity: Not on file   Stress: Not on file   Social Connections: Not on file   Intimate Partner Violence: Not on file   Housing Stability: Not on file      Medications and Allergies:     Current Outpatient Medications   Medication Sig Dispense Refill    lisinopril (ZESTRIL) 5 mg tablet Take 1 tablet (5 mg total) by mouth daily 90 tablet 2    omeprazole (PriLOSEC) 20 mg delayed release capsule Take 1 capsule (20 mg total) by mouth daily 90 capsule 2    rosuvastatin (CRESTOR) 5 mg tablet Take 1 tablet (5 mg total) by mouth daily 90 tablet 2    meloxicam (MOBIC) 15 mg tablet Take 1 tablet (15 mg total) by mouth daily (Patient not taking: No sig reported) 30 tablet 0     No current facility-administered medications for this visit       No Known Allergies   Immunizations:     Immunization History Administered Date(s) Administered    COVID-19 J[employer]J ([employer]) vaccine 0 5 mL 04/09/2021    INFLUENZA 09/26/2018    Influenza, high dose seasonal 0 7 mL 09/12/2019, 10/08/2020    Tdap 09/20/2021      Health Maintenance: There are no preventive care reminders to display for this patient  Topic Date Due    Pneumococcal Vaccine: 65+ Years (1 - PCV) Never done    COVID-19 Vaccine (2 - Booster for [employer] series) 06/04/2021    Influenza Vaccine (1) 09/01/2022      Medicare Screening Tests and Risk Assessments:     Wash July is here for his Initial Wellness visit  Health Risk Assessment:   Patient rates overall health as good  Patient feels that their physical health rating is same  Patient is satisfied with their life  Eyesight was rated as same  Hearing was rated as slightly worse  Patient feels that their emotional and mental health rating is same  Patients states they are never, rarely angry  Patient states they are sometimes unusually tired/fatigued  Pain experienced in the last 7 days has been none  Patient states that he has experienced no weight loss or gain in last 6 months  Depression Screening:   PHQ-2 Score: 0      Fall Risk Screening: In the past year, patient has experienced: no history of falling in past year      Home Safety:  Patient does not have trouble with stairs inside or outside of their home  Patient has working smoke alarms and has working carbon monoxide detector  Home safety hazards include: none  Nutrition:   Current diet is Regular  Medications:   Patient is currently taking over-the-counter supplements  OTC medications include: see medication list  Patient is able to manage medications  Activities of Daily Living (ADLs)/Instrumental Activities of Daily Living (IADLs):   Walk and transfer into and out of bed and chair?: Yes  Dress and groom yourself?: Yes    Bathe or shower yourself?: Yes    Feed yourself?  Yes  Do your laundry/housekeeping?: Yes  Manage your money, pay your bills and track your expenses?: Yes  Make your own meals?: Yes    Do your own shopping?: Yes    Previous Hospitalizations:   Any hospitalizations or ED visits within the last 12 months?: No      Advance Care Planning:   Living will: Yes    Durable POA for healthcare: Yes    Advanced directive: Yes      PREVENTIVE SCREENINGS      Cardiovascular Screening:    General: Screening Not Indicated and History Lipid Disorder      Diabetes Screening:     General: Screening Current      Colorectal Cancer Screening:     General: Screening Not Indicated      Prostate Cancer Screening:    General: Screening Not Indicated      Abdominal Aortic Aneurysm (AAA) Screening:    Risk factors include: tobacco use        Lung Cancer Screening:     General: Screening Not Indicated    Screening, Brief Intervention, and Referral to Treatment (SBIRT)    Screening  Typical number of drinks in a day: 0  Typical number of drinks in a week: 0  Interpretation: Low risk drinking behavior  AUDIT-C Screenin) How often did you have a drink containing alcohol in the past year? never  2) How many drinks did you have on a typical day when you were drinking in the past year? 0  3) How often did you have 6 or more drinks on one occasion in the past year? never    AUDIT-C Score: 0  Interpretation: Score 0-3 (male): Negative screen for alcohol misuse    Single Item Drug Screening:  How often have you used an illegal drug (including marijuana) or a prescription medication for non-medical reasons in the past year? never    Single Item Drug Screen Score: 0  Interpretation: Negative screen for possible drug use disorder    No exam data present     Physical Exam:     /80 (BP Location: Left arm, Patient Position: Sitting)   Pulse 64   Temp 98 9 °F (37 2 °C)   Resp 22   Ht 5' 3" (1 6 m)   Wt 80 5 kg (177 lb 6 4 oz)   SpO2 96%   BMI 31 42 kg/m²       Physical Exam  Vitals and nursing note reviewed     Constitutional: Appearance: Normal appearance  He is well-developed and normal weight  HENT:      Head: Normocephalic and atraumatic  Right Ear: Tympanic membrane, ear canal and external ear normal       Left Ear: Tympanic membrane, ear canal and external ear normal       Nose: Nose normal       Mouth/Throat:      Mouth: Mucous membranes are moist       Pharynx: Oropharynx is clear  Eyes:      Extraocular Movements: Extraocular movements intact  Conjunctiva/sclera: Conjunctivae normal       Pupils: Pupils are equal, round, and reactive to light  Cardiovascular:      Rate and Rhythm: Normal rate and regular rhythm  Heart sounds: No murmur heard  Pulmonary:      Effort: Pulmonary effort is normal  No respiratory distress  Breath sounds: Normal breath sounds  Abdominal:      General: Bowel sounds are normal       Palpations: Abdomen is soft  Tenderness: There is no abdominal tenderness  Musculoskeletal:         General: Normal range of motion  Cervical back: Normal range of motion and neck supple  Right lower leg: Edema present  Left lower leg: Edema present  Skin:     General: Skin is warm and dry  Capillary Refill: Capillary refill takes less than 2 seconds  Neurological:      General: No focal deficit present  Mental Status: He is alert and oriented to person, place, and time  Mental status is at baseline  Psychiatric:         Mood and Affect: Mood normal          Behavior: Behavior normal          Thought Content: Thought content normal          Judgment: Judgment normal       Ear cerumen removal    Date/Time: 7/19/2022 11:07 AM  Performed by: Andrade Lim DO  Authorized by: Andrade Lim DO   Universal Protocol:  Consent: Verbal consent obtained    Consent given by: patient  Patient understanding: patient states understanding of the procedure being performed  Patient identity confirmed: verbally with patient      Patient location:  Clinic  Procedure details:     Local anesthetic:  None    Location:  L ear and R ear    Procedure type: irrigation only    Post-procedure details:     Complication:  None    Hearing quality:  Improved    Patient tolerance of procedure:   Tolerated well, no immediate complications        Tj Carrera DO

## 2022-07-19 NOTE — ASSESSMENT & PLAN NOTE
Longstanding mixed hyperlipidemia has been stable with Crestor 5 mg tablets continue this daily    Monitor laboratory work every 6 months

## 2022-07-26 ENCOUNTER — OFFICE VISIT (OUTPATIENT)
Dept: FAMILY MEDICINE CLINIC | Facility: CLINIC | Age: 87
End: 2022-07-26
Payer: MEDICARE

## 2022-07-26 VITALS
TEMPERATURE: 98.1 F | HEART RATE: 61 BPM | HEIGHT: 63 IN | BODY MASS INDEX: 31.36 KG/M2 | OXYGEN SATURATION: 95 % | WEIGHT: 177 LBS | RESPIRATION RATE: 22 BRPM | DIASTOLIC BLOOD PRESSURE: 60 MMHG | SYSTOLIC BLOOD PRESSURE: 130 MMHG

## 2022-07-26 DIAGNOSIS — M25.472 ANKLE EDEMA, BILATERAL: Primary | ICD-10-CM

## 2022-07-26 DIAGNOSIS — M25.471 ANKLE EDEMA, BILATERAL: Primary | ICD-10-CM

## 2022-07-26 PROCEDURE — 99213 OFFICE O/P EST LOW 20 MIN: CPT | Performed by: FAMILY MEDICINE

## 2022-07-27 NOTE — PROGRESS NOTES
Assessment/Plan:       Problem List Items Addressed This Visit        Other    Ankle edema, bilateral - Primary     eDEMA UNCHANGED, aDD COMPRESSION SOCKS-NEW TO BE ORDERED BY HIS NEIGHBOR WITH HIM TODAY -WE GAVE aMAZON INFO PAGE 15-20MM [employer] PRESSURE MEDIUM                  Subjective:      Patient ID: Jonathan Diamond is a 80 y o  male  LEG SWELLING NOTED BY HIS EYE DOCTOR TOLD HIM TO COME TO ME FOR EVALUATION      The following portions of the patient's history were reviewed and updated as appropriate: allergies, current medications, past family history, past medical history, past social history, past surgical history and problem list     Review of Systems   Constitutional: Negative for chills, fatigue and fever  HENT: Negative for congestion, nosebleeds, rhinorrhea, sinus pressure and sore throat  Eyes: Negative for discharge and redness  Respiratory: Negative for cough and shortness of breath  Cardiovascular: Positive for leg swelling  Negative for chest pain and palpitations  Gastrointestinal: Negative for abdominal pain, blood in stool and nausea  Endocrine: Negative for cold intolerance, heat intolerance and polyuria  Genitourinary: Negative for dysuria and frequency  Musculoskeletal: Negative for arthralgias, back pain and myalgias  Skin: Negative for rash  Neurological: Negative for dizziness, weakness and headaches  Hematological: Negative for adenopathy  Psychiatric/Behavioral: Negative for behavioral problems and sleep disturbance  The patient is not nervous/anxious  Objective:      /60 (BP Location: Left arm, Patient Position: Sitting)   Pulse 61   Temp 98 1 °F (36 7 °C)   Resp 22   Ht 5' 3" (1 6 m)   Wt 80 3 kg (177 lb)   SpO2 95%   BMI 31 35 kg/m²        Physical Exam  Vitals and nursing note reviewed  Constitutional:       Appearance: He is well-developed  HENT:      Head: Normocephalic and atraumatic        Right Ear: External ear normal  Left Ear: External ear normal       Nose: Nose normal    Eyes:      General: No scleral icterus  Conjunctiva/sclera: Conjunctivae normal       Pupils: Pupils are equal, round, and reactive to light  Neck:      Thyroid: No thyromegaly  Vascular: No JVD  Cardiovascular:      Rate and Rhythm: Normal rate and regular rhythm  Heart sounds: Normal heart sounds  No murmur heard  Pulmonary:      Effort: Pulmonary effort is normal       Breath sounds: Normal breath sounds  No wheezing or rales  Chest:      Chest wall: No tenderness  Abdominal:      General: Bowel sounds are normal  There is no distension  Palpations: Abdomen is soft  There is no mass  Tenderness: There is no abdominal tenderness  There is no guarding or rebound  Musculoskeletal:         General: No tenderness or deformity  Normal range of motion  Cervical back: Normal range of motion and neck supple  Right lower leg: Edema present  Left lower leg: Edema present  Lymphadenopathy:      Cervical: No cervical adenopathy  Skin:     General: Skin is warm and dry  Findings: No erythema or rash  Neurological:      Mental Status: He is alert and oriented to person, place, and time  Cranial Nerves: No cranial nerve deficit  Deep Tendon Reflexes: Reflexes are normal and symmetric  Reflexes normal    Psychiatric:         Behavior: Behavior normal          Thought Content:  Thought content normal          Judgment: Judgment normal           Data:    Laboratory Results:   Radiology/Other Diagnostic Testing Results:      Lab Results   Component Value Date    WBC 7 81 01/18/2022    HGB 14 0 01/18/2022    HCT 43 5 01/18/2022    MCV 93 01/18/2022     01/18/2022     Lab Results   Component Value Date    K 4 4 01/18/2022     01/18/2022    CO2 30 01/18/2022    BUN 15 01/18/2022    CREATININE 1 12 01/18/2022    GLUF 84 01/18/2022    CALCIUM 8 8 01/18/2022    AST 12 01/18/2022    ALT 17 01/18/2022 ALKPHOS 86 01/18/2022    EGFR 55 01/18/2022     Lab Results   Component Value Date    CHOLESTEROL 174 07/14/2021    CHOLESTEROL 177 01/05/2021    CHOLESTEROL 164 07/06/2020     Lab Results   Component Value Date    HDL 57 07/14/2021    HDL 58 01/05/2021    HDL 49 07/06/2020     Lab Results   Component Value Date    LDLCALC 97 07/14/2021    LDLCALC 105 (H) 01/05/2021    LDLCALC 97 07/06/2020     Lab Results   Component Value Date    TRIG 99 07/14/2021    TRIG 69 01/05/2021    TRIG 88 07/06/2020     No results found for: [place], [place]  Lab Results   Component Value Date    [id number] 1 120 07/14/2021     No results found for: HGBA1C  Lab Results   Component Value Date    PSA 5 4 (H) 07/14/2021       June Puri DO

## 2022-07-27 NOTE — ASSESSMENT & PLAN NOTE
eDEMA UNCHANGED, aDD COMPRESSION SOCKS-NEW TO BE ORDERED BY HIS NEIGHBOR WITH HIM TODAY -WE GAVE aMAZON INFO PAGE 15-20MM [employer]URE MEDIUM

## 2022-09-01 DIAGNOSIS — I10 ESSENTIAL HYPERTENSION: ICD-10-CM

## 2022-09-01 DIAGNOSIS — K21.9 GASTROESOPHAGEAL REFLUX DISEASE WITHOUT ESOPHAGITIS: ICD-10-CM

## 2022-09-01 DIAGNOSIS — E78.2 MIXED HYPERLIPIDEMIA: ICD-10-CM

## 2022-09-01 RX ORDER — OMEPRAZOLE 20 MG/1
20 CAPSULE, DELAYED RELEASE ORAL DAILY
Qty: 90 CAPSULE | Refills: 2 | Status: SHIPPED | OUTPATIENT
Start: 2022-09-01

## 2022-09-01 RX ORDER — LISINOPRIL 5 MG/1
5 TABLET ORAL DAILY
Qty: 90 TABLET | Refills: 2 | Status: SHIPPED | OUTPATIENT
Start: 2022-09-01

## 2022-09-01 RX ORDER — ROSUVASTATIN CALCIUM 5 MG/1
5 TABLET, COATED ORAL DAILY
Qty: 90 TABLET | Refills: 2 | Status: SHIPPED | OUTPATIENT
Start: 2022-09-01

## 2022-10-11 PROBLEM — H61.23 BILATERAL HEARING LOSS DUE TO CERUMEN IMPACTION: Status: RESOLVED | Noted: 2022-07-19 | Resolved: 2022-10-11

## 2022-10-12 PROBLEM — S61.216A LACERATION OF RIGHT LITTLE FINGER WITHOUT FOREIGN BODY WITHOUT DAMAGE TO NAIL: Status: RESOLVED | Noted: 2021-09-28 | Resolved: 2022-10-12

## 2022-10-18 ENCOUNTER — APPOINTMENT (OUTPATIENT)
Dept: LAB | Facility: CLINIC | Age: 87
End: 2022-10-18
Payer: MEDICARE

## 2022-10-18 DIAGNOSIS — E78.2 MIXED HYPERLIPIDEMIA: ICD-10-CM

## 2022-10-18 DIAGNOSIS — I10 ESSENTIAL HYPERTENSION: ICD-10-CM

## 2022-10-18 LAB
ALBUMIN SERPL BCP-MCNC: 3.5 G/DL (ref 3.5–5)
ALP SERPL-CCNC: 99 U/L (ref 46–116)
ALT SERPL W P-5'-P-CCNC: 14 U/L (ref 12–78)
ANION GAP SERPL CALCULATED.3IONS-SCNC: 3 MMOL/L (ref 4–13)
AST SERPL W P-5'-P-CCNC: 14 U/L (ref 5–45)
BASOPHILS # BLD AUTO: 0.04 THOUSANDS/ΜL (ref 0–0.1)
BASOPHILS NFR BLD AUTO: 0 % (ref 0–1)
BILIRUB SERPL-MCNC: 0.69 MG/DL (ref 0.2–1)
BUN SERPL-MCNC: 12 MG/DL (ref 5–25)
CALCIUM SERPL-MCNC: 8.7 MG/DL (ref 8.3–10.1)
CHLORIDE SERPL-SCNC: 106 MMOL/L (ref 96–108)
CHOLEST SERPL-MCNC: 149 MG/DL
CO2 SERPL-SCNC: 28 MMOL/L (ref 21–32)
CREAT SERPL-MCNC: 1.01 MG/DL (ref 0.6–1.3)
EOSINOPHIL # BLD AUTO: 0.53 THOUSAND/ΜL (ref 0–0.61)
EOSINOPHIL NFR BLD AUTO: 5 % (ref 0–6)
ERYTHROCYTE [DISTWIDTH] IN BLOOD BY AUTOMATED COUNT: 14.6 % (ref 11.6–15.1)
GFR SERPL CREATININE-BSD FRML MDRD: 62 ML/MIN/1.73SQ M
GLUCOSE P FAST SERPL-MCNC: 85 MG/DL (ref 65–99)
HCT VFR BLD AUTO: 42.1 % (ref 36.5–49.3)
HDLC SERPL-MCNC: 45 MG/DL
HGB BLD-MCNC: 13 G/DL (ref 12–17)
IMM GRANULOCYTES # BLD AUTO: 0.02 THOUSAND/UL (ref 0–0.2)
IMM GRANULOCYTES NFR BLD AUTO: 0 % (ref 0–2)
LDLC SERPL CALC-MCNC: 89 MG/DL (ref 0–100)
LYMPHOCYTES # BLD AUTO: 1.38 THOUSANDS/ΜL (ref 0.6–4.47)
LYMPHOCYTES NFR BLD AUTO: 14 % (ref 14–44)
MCH RBC QN AUTO: 28.9 PG (ref 26.8–34.3)
MCHC RBC AUTO-ENTMCNC: 30.9 G/DL (ref 31.4–37.4)
MCV RBC AUTO: 94 FL (ref 82–98)
MONOCYTES # BLD AUTO: 0.93 THOUSAND/ΜL (ref 0.17–1.22)
MONOCYTES NFR BLD AUTO: 9 % (ref 4–12)
NEUTROPHILS # BLD AUTO: 7.01 THOUSANDS/ΜL (ref 1.85–7.62)
NEUTS SEG NFR BLD AUTO: 72 % (ref 43–75)
NONHDLC SERPL-MCNC: 104 MG/DL
NRBC BLD AUTO-RTO: 0 /100 WBCS
PLATELET # BLD AUTO: 229 THOUSANDS/UL (ref 149–390)
PMV BLD AUTO: 12.1 FL (ref 8.9–12.7)
POTASSIUM SERPL-SCNC: 4.1 MMOL/L (ref 3.5–5.3)
PROT SERPL-MCNC: 7.5 G/DL (ref 6.4–8.4)
RBC # BLD AUTO: 4.5 MILLION/UL (ref 3.88–5.62)
SODIUM SERPL-SCNC: 137 MMOL/L (ref 135–147)
TRIGL SERPL-MCNC: 76 MG/DL
TSH SERPL DL<=0.05 MIU/L-ACNC: 1.05 UIU/ML (ref 0.45–4.5)
WBC # BLD AUTO: 9.91 THOUSAND/UL (ref 4.31–10.16)

## 2022-10-18 PROCEDURE — 84443 ASSAY THYROID STIM HORMONE: CPT

## 2022-10-18 PROCEDURE — 80061 LIPID PANEL: CPT

## 2022-10-18 PROCEDURE — 36415 COLL VENOUS BLD VENIPUNCTURE: CPT

## 2022-10-18 PROCEDURE — 85025 COMPLETE CBC W/AUTO DIFF WBC: CPT

## 2022-10-18 PROCEDURE — 80053 COMPREHEN METABOLIC PANEL: CPT

## 2022-10-25 ENCOUNTER — OFFICE VISIT (OUTPATIENT)
Dept: FAMILY MEDICINE CLINIC | Facility: CLINIC | Age: 87
End: 2022-10-25
Payer: MEDICARE

## 2022-10-25 VITALS
OXYGEN SATURATION: 95 % | RESPIRATION RATE: 22 BRPM | TEMPERATURE: 98 F | WEIGHT: 173.6 LBS | SYSTOLIC BLOOD PRESSURE: 128 MMHG | BODY MASS INDEX: 30.76 KG/M2 | HEIGHT: 63 IN | HEART RATE: 62 BPM | DIASTOLIC BLOOD PRESSURE: 78 MMHG

## 2022-10-25 DIAGNOSIS — M25.471 ANKLE EDEMA, BILATERAL: ICD-10-CM

## 2022-10-25 DIAGNOSIS — M25.472 ANKLE EDEMA, BILATERAL: ICD-10-CM

## 2022-10-25 DIAGNOSIS — M15.9 OSTEOARTHRITIS, GENERALIZED: ICD-10-CM

## 2022-10-25 DIAGNOSIS — I10 ESSENTIAL HYPERTENSION: Primary | ICD-10-CM

## 2022-10-25 DIAGNOSIS — N18.31 STAGE 3A CHRONIC KIDNEY DISEASE (HCC): ICD-10-CM

## 2022-10-25 DIAGNOSIS — Z23 ENCOUNTER FOR IMMUNIZATION: ICD-10-CM

## 2022-10-25 PROCEDURE — 90662 IIV NO PRSV INCREASED AG IM: CPT

## 2022-10-25 PROCEDURE — G0008 ADMIN INFLUENZA VIRUS VAC: HCPCS

## 2022-10-25 PROCEDURE — 99214 OFFICE O/P EST MOD 30 MIN: CPT | Performed by: FAMILY MEDICINE

## 2022-10-25 NOTE — ASSESSMENT & PLAN NOTE
Lab Results   Component Value Date    EGFR 62 10/18/2022    EGFR 55 01/18/2022    EGFR 68 07/14/2021    CREATININE 1 01 10/18/2022    CREATININE 1 12 01/18/2022    CREATININE 0 95 07/14/2021   Monitor laboratory work continue same medications

## 2022-10-25 NOTE — PROGRESS NOTES
Assessment/Plan:       Problem List Items Addressed This Visit        Cardiovascular and Mediastinum    Essential hypertension - Primary     Hypertension stable control continue with lisinopril            Musculoskeletal and Integument    Osteoarthritis, generalized     Continue HEP stretching and exercises daily            Genitourinary    Stage 3a chronic kidney disease (HCC)     Lab Results   Component Value Date    EGFR 62 10/18/2022    EGFR 55 01/18/2022    EGFR 68 07/14/2021    CREATININE 1 01 10/18/2022    CREATININE 1 12 01/18/2022    CREATININE 0 95 07/14/2021   Monitor laboratory work continue same medications            Other    Ankle edema, bilateral     Patient had difficulty with compression socks unable to get them on and off he had to cut off the pair that he had on once before now he is using standard compression socks with less pressure and he is able to manipulate these and get them off and on and they are providing good relief I would like him to continue with these over-the-counter products                 Subjective:      Patient ID: Sarah Ewing is a 80 y o  male  Patient presents for 3 month follow-up evaluation for edema of his lower extremities      The following portions of the patient's history were reviewed and updated as appropriate: allergies, current medications, past family history, past medical history, past social history, past surgical history and problem list     Review of Systems   Constitutional: Negative for chills, fatigue and fever  HENT: Negative for congestion, nosebleeds, rhinorrhea, sinus pressure and sore throat  Eyes: Negative for discharge and redness  Respiratory: Positive for shortness of breath  Negative for cough  Cardiovascular: Positive for leg swelling  Negative for chest pain and palpitations  Gastrointestinal: Negative for abdominal pain, blood in stool and nausea  Endocrine: Negative for cold intolerance, heat intolerance and polyuria  Genitourinary: Negative for dysuria and frequency  Musculoskeletal: Negative for arthralgias, back pain and myalgias  Skin: Negative for rash  Neurological: Negative for dizziness, weakness and headaches  Hematological: Negative for adenopathy  Psychiatric/Behavioral: Negative for behavioral problems and sleep disturbance  The patient is not nervous/anxious  Objective:      /78 (BP Location: Left arm, Patient Position: Sitting)   Pulse 62   Temp 98 °F (36 7 °C)   Resp 22   Ht 5' 3" (1 6 m)   Wt 78 7 kg (173 lb 9 6 oz)   SpO2 95%   BMI 30 75 kg/m²        Physical Exam  Vitals and nursing note reviewed  Constitutional:       Appearance: Normal appearance  He is well-developed  HENT:      Head: Normocephalic and atraumatic  Right Ear: Tympanic membrane and external ear normal       Left Ear: Tympanic membrane and external ear normal       Nose: Nose normal       Mouth/Throat:      Mouth: Mucous membranes are moist       Pharynx: Oropharynx is clear  Eyes:      General: No scleral icterus  Conjunctiva/sclera: Conjunctivae normal       Pupils: Pupils are equal, round, and reactive to light  Neck:      Thyroid: No thyromegaly  Vascular: No JVD  Cardiovascular:      Rate and Rhythm: Normal rate and regular rhythm  Pulses: Normal pulses  Heart sounds: Normal heart sounds  No murmur heard  Pulmonary:      Effort: Pulmonary effort is normal       Breath sounds: Normal breath sounds  No wheezing or rales  Chest:      Chest wall: No tenderness  Abdominal:      General: Bowel sounds are normal  There is no distension  Palpations: Abdomen is soft  There is no mass  Tenderness: There is no abdominal tenderness  There is no guarding or rebound  Musculoskeletal:         General: No tenderness or deformity  Normal range of motion  Cervical back: Normal range of motion and neck supple  Lymphadenopathy:      Cervical: No cervical adenopathy  Skin:     General: Skin is warm and dry  Capillary Refill: Capillary refill takes less than 2 seconds  Findings: No erythema or rash  Neurological:      General: No focal deficit present  Mental Status: He is alert and oriented to person, place, and time  Mental status is at baseline  Cranial Nerves: No cranial nerve deficit  Deep Tendon Reflexes: Reflexes are normal and symmetric  Reflexes normal    Psychiatric:         Mood and Affect: Mood normal          Behavior: Behavior normal          Thought Content: Thought content normal          Judgment: Judgment normal           Data:    Laboratory Results: I have personally reviewed the pertinent laboratory results/reports   Radiology/Other Diagnostic Testing Results: I have personally reviewed pertinent reports         Lab Results   Component Value Date    WBC 9 91 10/18/2022    HGB 13 0 10/18/2022    HCT 42 1 10/18/2022    MCV 94 10/18/2022     10/18/2022     Lab Results   Component Value Date    K 4 1 10/18/2022     10/18/2022    CO2 28 10/18/2022    BUN 12 10/18/2022    CREATININE 1 01 10/18/2022    GLUF 85 10/18/2022    CALCIUM 8 7 10/18/2022    AST 14 10/18/2022    ALT 14 10/18/2022    ALKPHOS 99 10/18/2022    EGFR 62 10/18/2022     Lab Results   Component Value Date    CHOLESTEROL 149 10/18/2022    CHOLESTEROL 174 07/14/2021    CHOLESTEROL 177 01/05/2021     Lab Results   Component Value Date    HDL 45 10/18/2022    HDL 57 07/14/2021    HDL 58 01/05/2021     Lab Results   Component Value Date    LDLCALC 89 10/18/2022    LDLCALC 97 07/14/2021    LDLCALC 105 (H) 01/05/2021     Lab Results   Component Value Date    TRIG 76 10/18/2022    TRIG 99 07/14/2021    TRIG 69 01/05/2021     No results found for: [place], [place]  Lab Results   Component Value Date    [id number] 1 050 10/18/2022     No results found for: HGBA1C  Lab Results   Component Value Date    PSA 5 4 (H) 07/14/2021       Tg Temple

## 2022-10-25 NOTE — ASSESSMENT & PLAN NOTE
Patient had difficulty with compression socks unable to get them on and off he had to cut off the pair that he had on once before now he is using standard compression socks with less pressure and he is able to manipulate these and get them off and on and they are providing good relief I would like him to continue with these over-the-counter products

## 2022-11-22 DIAGNOSIS — E78.2 MIXED HYPERLIPIDEMIA: ICD-10-CM

## 2022-11-22 DIAGNOSIS — I10 ESSENTIAL HYPERTENSION: ICD-10-CM

## 2022-11-22 DIAGNOSIS — K21.9 GASTROESOPHAGEAL REFLUX DISEASE WITHOUT ESOPHAGITIS: ICD-10-CM

## 2022-11-22 RX ORDER — LISINOPRIL 5 MG/1
5 TABLET ORAL DAILY
Qty: 90 TABLET | Refills: 2 | Status: SHIPPED | OUTPATIENT
Start: 2022-11-22

## 2022-11-22 RX ORDER — ROSUVASTATIN CALCIUM 5 MG/1
5 TABLET, COATED ORAL DAILY
Qty: 90 TABLET | Refills: 2 | Status: SHIPPED | OUTPATIENT
Start: 2022-11-22

## 2022-11-22 RX ORDER — OMEPRAZOLE 20 MG/1
20 CAPSULE, DELAYED RELEASE ORAL DAILY
Qty: 90 CAPSULE | Refills: 2 | Status: SHIPPED | OUTPATIENT
Start: 2022-11-22

## 2022-12-30 ENCOUNTER — OFFICE VISIT (OUTPATIENT)
Dept: FAMILY MEDICINE CLINIC | Facility: CLINIC | Age: 87
End: 2022-12-30

## 2022-12-30 VITALS
HEART RATE: 72 BPM | HEIGHT: 63 IN | BODY MASS INDEX: 31.08 KG/M2 | TEMPERATURE: 97 F | SYSTOLIC BLOOD PRESSURE: 146 MMHG | OXYGEN SATURATION: 96 % | DIASTOLIC BLOOD PRESSURE: 70 MMHG | WEIGHT: 175.4 LBS | RESPIRATION RATE: 22 BRPM

## 2022-12-30 DIAGNOSIS — I10 ESSENTIAL HYPERTENSION: ICD-10-CM

## 2022-12-30 DIAGNOSIS — K92.1: Primary | ICD-10-CM

## 2022-12-30 NOTE — ASSESSMENT & PLAN NOTE
Patient presents for a follow-up evaluation after he saw blood in his bowel movement 3 days ago since that time he has had normal bowel movements without blood on examination today rectal exam shows a small hemorrhoid and he is Hemoccult negative  He does have a very tight anal opening and I recommend fiber in his diet watch for avoidance of hard stools and apply Preparation H now or as needed for hemorrhoids and to allow for lubrication on bowel movements    He will follow-up if this persists or continues no immediate treatment now otherwise as symptoms resolved

## 2022-12-30 NOTE — PROGRESS NOTES
Assessment/Plan:       Problem List Items Addressed This Visit        Cardiovascular and Mediastinum    Essential hypertension     Stable blood pressure no change in medications            Other    Blood in stool, so - Primary     Patient presents for a follow-up evaluation after he saw blood in his bowel movement 3 days ago since that time he has had normal bowel movements without blood on examination today rectal exam shows a small hemorrhoid and he is Hemoccult negative  He does have a very tight anal opening and I recommend fiber in his diet watch for avoidance of hard stools and apply Preparation H now or as needed for hemorrhoids and to allow for lubrication on bowel movements  He will follow-up if this persists or continues no immediate treatment now otherwise as symptoms resolved              Subjective:      Patient ID: Diaz Arauz is a 80 y o  male  Patient presents for blood in his bowel movement 3 days ago      The following portions of the patient's history were reviewed and updated as appropriate: allergies, current medications, past family history, past medical history, past social history, past surgical history and problem list     Review of Systems   Constitutional: Negative for chills, fatigue and fever  HENT: Negative for congestion, nosebleeds, rhinorrhea, sinus pressure and sore throat  Eyes: Negative for discharge and redness  Respiratory: Negative for cough and shortness of breath  Cardiovascular: Negative for chest pain, palpitations and leg swelling  Gastrointestinal: Positive for blood in stool  Negative for abdominal pain and nausea  Endocrine: Negative for cold intolerance, heat intolerance and polyuria  Genitourinary: Negative for dysuria and frequency  Musculoskeletal: Negative for arthralgias, back pain and myalgias  Skin: Negative for rash  Neurological: Negative for dizziness, weakness and headaches  Hematological: Negative for adenopathy  Psychiatric/Behavioral: Negative for behavioral problems and sleep disturbance  The patient is not nervous/anxious  Objective:      /70 (BP Location: Left arm, Patient Position: Sitting, Cuff Size: Standard)   Pulse 72   Temp (!) 97 °F (36 1 °C)   Resp 22   Ht 5' 3" (1 6 m)   Wt 79 6 kg (175 lb 6 4 oz)   SpO2 96%   BMI 31 07 kg/m²        Physical Exam  Vitals and nursing note reviewed  Constitutional:       Appearance: He is well-developed  HENT:      Head: Normocephalic and atraumatic  Right Ear: External ear normal       Left Ear: External ear normal       Nose: Nose normal    Eyes:      General: No scleral icterus  Conjunctiva/sclera: Conjunctivae normal       Pupils: Pupils are equal, round, and reactive to light  Neck:      Thyroid: No thyromegaly  Vascular: No JVD  Cardiovascular:      Rate and Rhythm: Normal rate and regular rhythm  Heart sounds: Normal heart sounds  No murmur heard  Pulmonary:      Effort: Pulmonary effort is normal       Breath sounds: Normal breath sounds  No wheezing or rales  Chest:      Chest wall: No tenderness  Abdominal:      General: Bowel sounds are normal  There is no distension  Palpations: Abdomen is soft  There is no mass  Tenderness: There is no abdominal tenderness  There is no guarding or rebound  Genitourinary:     Rectum: Guaiac result negative  Comments: Tight anal sphincter difficult opening with small hemorrhoid  Musculoskeletal:         General: No tenderness or deformity  Normal range of motion  Cervical back: Normal range of motion and neck supple  Lymphadenopathy:      Cervical: No cervical adenopathy  Skin:     General: Skin is warm and dry  Findings: No erythema or rash  Neurological:      Mental Status: He is alert and oriented to person, place, and time  Cranial Nerves: No cranial nerve deficit  Deep Tendon Reflexes: Reflexes are normal and symmetric   Reflexes normal    Psychiatric:         Behavior: Behavior normal          Thought Content: Thought content normal          Judgment: Judgment normal           Data:    Laboratory Results: I have personally reviewed the pertinent laboratory results/reports   Radiology/Other Diagnostic Testing Results: I have personally reviewed pertinent reports         Lab Results   Component Value Date    WBC 9 91 10/18/2022    HGB 13 0 10/18/2022    HCT 42 1 10/18/2022    MCV 94 10/18/2022     10/18/2022     Lab Results   Component Value Date    K 4 1 10/18/2022     10/18/2022    CO2 28 10/18/2022    BUN 12 10/18/2022    CREATININE 1 01 10/18/2022    GLUF 85 10/18/2022    CALCIUM 8 7 10/18/2022    AST 14 10/18/2022    ALT 14 10/18/2022    ALKPHOS 99 10/18/2022    EGFR 62 10/18/2022     Lab Results   Component Value Date    CHOLESTEROL 149 10/18/2022    CHOLESTEROL 174 07/14/2021    CHOLESTEROL 177 01/05/2021     Lab Results   Component Value Date    HDL 45 10/18/2022    HDL 57 07/14/2021    HDL 58 01/05/2021     Lab Results   Component Value Date    LDLCALC 89 10/18/2022    LDLCALC 97 07/14/2021    LDLCALC 105 (H) 01/05/2021     Lab Results   Component Value Date    TRIG 76 10/18/2022    TRIG 99 07/14/2021    TRIG 69 01/05/2021     No results found for: [place], [place]  Lab Results   Component Value Date    [id number] 1 050 10/18/2022     No results found for: HGBA1C  Lab Results   Component Value Date    PSA 5 4 (H) 07/14/2021       Silver Monsalve DO

## 2023-01-24 ENCOUNTER — OFFICE VISIT (OUTPATIENT)
Dept: FAMILY MEDICINE CLINIC | Facility: CLINIC | Age: 88
End: 2023-01-24

## 2023-01-24 VITALS
DIASTOLIC BLOOD PRESSURE: 70 MMHG | OXYGEN SATURATION: 93 % | BODY MASS INDEX: 30.69 KG/M2 | WEIGHT: 173.2 LBS | TEMPERATURE: 98.4 F | SYSTOLIC BLOOD PRESSURE: 110 MMHG | HEIGHT: 63 IN | HEART RATE: 76 BPM | RESPIRATION RATE: 22 BRPM

## 2023-01-24 DIAGNOSIS — M25.471 ANKLE EDEMA, BILATERAL: ICD-10-CM

## 2023-01-24 DIAGNOSIS — M25.472 ANKLE EDEMA, BILATERAL: ICD-10-CM

## 2023-01-24 DIAGNOSIS — K21.9 GASTROESOPHAGEAL REFLUX DISEASE WITHOUT ESOPHAGITIS: ICD-10-CM

## 2023-01-24 DIAGNOSIS — I10 ESSENTIAL HYPERTENSION: Primary | ICD-10-CM

## 2023-01-24 DIAGNOSIS — N18.31 STAGE 3A CHRONIC KIDNEY DISEASE (HCC): ICD-10-CM

## 2023-01-24 DIAGNOSIS — M15.9 OSTEOARTHRITIS, GENERALIZED: ICD-10-CM

## 2023-01-24 NOTE — PROGRESS NOTES
Assessment/Plan:       Problem List Items Addressed This Visit        Digestive    Gastroesophageal reflux disease without esophagitis     GERD symptoms stable on omeprazole no change in dosing follow-up at next office visit in 3 months         Relevant Orders    Comprehensive metabolic panel    Lipid panel    TSH, 3rd generation with Free T4 reflex    CBC and differential       Cardiovascular and Mediastinum    Essential hypertension - Primary     Hypertension under stable control with blood pressure at 110/70 continuing on lisinopril 5 mg continue same medication dosage         Relevant Orders    Comprehensive metabolic panel    Lipid panel    TSH, 3rd generation with Free T4 reflex    CBC and differential       Musculoskeletal and Integument    Osteoarthritis, generalized     Unchanged diffuse arthritis causing aches and pains in his knees and lower back but patient is functional            Genitourinary    Stage 3a chronic kidney disease (HCC)     Lab Results   Component Value Date    EGFR 62 10/18/2022    EGFR 55 01/18/2022    EGFR 68 07/14/2021    CREATININE 1 01 10/18/2022    CREATININE 1 12 01/18/2022    CREATININE 0 95 07/14/2021   Chronic kidney disease stable continue same medications follow-up renal function laboratory work as scheduled every 6 months now         Relevant Orders    Comprehensive metabolic panel    Lipid panel    TSH, 3rd generation with Free T4 reflex    CBC and differential       Other    Ankle edema, bilateral     Continue with compression socks         Relevant Orders    Compression bandages    Comprehensive metabolic panel    Lipid panel    TSH, 3rd generation with Free T4 reflex    CBC and differential         Subjective:      Patient ID: Diaz Arauz is a 80 y o  male      Patient presents for 3-month follow-up evaluation      The following portions of the patient's history were reviewed and updated as appropriate: allergies, current medications, past family history, past medical history, past social history, past surgical history and problem list     Review of Systems   Constitutional: Negative for chills, fatigue and fever  HENT: Negative for congestion, nosebleeds, rhinorrhea, sinus pressure and sore throat  Eyes: Negative for discharge and redness  Respiratory: Negative for cough and shortness of breath  Cardiovascular: Negative for chest pain, palpitations and leg swelling  Gastrointestinal: Negative for abdominal pain, blood in stool and nausea  Endocrine: Negative for cold intolerance, heat intolerance and polyuria  Genitourinary: Negative for dysuria and frequency  Musculoskeletal: Negative for arthralgias, back pain and myalgias  Skin: Negative for rash  Neurological: Negative for dizziness, weakness and headaches  Hematological: Negative for adenopathy  Psychiatric/Behavioral: Negative for behavioral problems and sleep disturbance  The patient is not nervous/anxious  Objective:      /70 (BP Location: Left arm, Patient Position: Sitting, Cuff Size: Standard)   Pulse 76   Temp 98 4 °F (36 9 °C) (Tympanic)   Resp 22   Ht 5' 3" (1 6 m)   Wt 78 6 kg (173 lb 3 2 oz)   SpO2 93%   BMI 30 68 kg/m²        Physical Exam  Vitals and nursing note reviewed  Constitutional:       Appearance: Normal appearance  He is well-developed and normal weight  HENT:      Head: Normocephalic and atraumatic  Right Ear: Tympanic membrane, ear canal and external ear normal       Left Ear: Tympanic membrane, ear canal and external ear normal       Nose: Nose normal       Mouth/Throat:      Mouth: Mucous membranes are moist       Pharynx: Oropharynx is clear  Eyes:      General: No scleral icterus  Extraocular Movements: Extraocular movements intact  Conjunctiva/sclera: Conjunctivae normal       Pupils: Pupils are equal, round, and reactive to light  Neck:      Thyroid: No thyromegaly  Vascular: No JVD     Cardiovascular:      Rate and Rhythm: Normal rate and regular rhythm  Pulses: Normal pulses  Heart sounds: Normal heart sounds  No murmur heard  Pulmonary:      Effort: Pulmonary effort is normal       Breath sounds: Normal breath sounds  No wheezing or rales  Chest:      Chest wall: No tenderness  Abdominal:      General: Bowel sounds are normal  There is no distension  Palpations: Abdomen is soft  There is no mass  Tenderness: There is no abdominal tenderness  There is no guarding or rebound  Musculoskeletal:         General: No tenderness or deformity  Normal range of motion  Cervical back: Normal range of motion and neck supple  Lymphadenopathy:      Cervical: No cervical adenopathy  Skin:     General: Skin is warm and dry  Capillary Refill: Capillary refill takes less than 2 seconds  Findings: No erythema or rash  Neurological:      General: No focal deficit present  Mental Status: He is alert and oriented to person, place, and time  Mental status is at baseline  Cranial Nerves: No cranial nerve deficit  Deep Tendon Reflexes: Reflexes are normal and symmetric  Reflexes normal    Psychiatric:         Mood and Affect: Mood normal          Behavior: Behavior normal          Thought Content: Thought content normal          Judgment: Judgment normal           Data:    Laboratory Results: I have personally reviewed the pertinent laboratory results/reports   Radiology/Other Diagnostic Testing Results: I have personally reviewed pertinent reports         Lab Results   Component Value Date    WBC 9 91 10/18/2022    HGB 13 0 10/18/2022    HCT 42 1 10/18/2022    MCV 94 10/18/2022     10/18/2022     Lab Results   Component Value Date    K 4 1 10/18/2022     10/18/2022    CO2 28 10/18/2022    BUN 12 10/18/2022    CREATININE 1 01 10/18/2022    GLUF 85 10/18/2022    CALCIUM 8 7 10/18/2022    AST 14 10/18/2022    ALT 14 10/18/2022    ALKPHOS 99 10/18/2022    EGFR 62 10/18/2022 Lab Results   Component Value Date    CHOLESTEROL 149 10/18/2022    CHOLESTEROL 174 07/14/2021    CHOLESTEROL 177 01/05/2021     Lab Results   Component Value Date    HDL 45 10/18/2022    HDL 57 07/14/2021    HDL 58 01/05/2021     Lab Results   Component Value Date    LDLCALC 89 10/18/2022    LDLCALC 97 07/14/2021    LDLCALC 105 (H) 01/05/2021     Lab Results   Component Value Date    TRIG 76 10/18/2022    TRIG 99 07/14/2021    TRIG 69 01/05/2021     No results found for: [place], [place]  Lab Results   Component Value Date    [id number] 1 050 10/18/2022     No results found for: HGBA1C  Lab Results   Component Value Date    PSA 5 4 (H) 07/14/2021       Patti Sousa DO

## 2023-01-24 NOTE — PROGRESS NOTES
BMI Counseling: Body mass index is 30 68 kg/m²  The BMI is above normal  No BMI follow-up plan is appropriate  Patient is 72 years old and weight reduction/weight gain would further complicate their underlying physical disability

## 2023-01-24 NOTE — ASSESSMENT & PLAN NOTE
Hypertension under stable control with blood pressure at 110/70 continuing on lisinopril 5 mg continue same medication dosage

## 2023-01-24 NOTE — ASSESSMENT & PLAN NOTE
Unchanged diffuse arthritis causing aches and pains in his knees and lower back but patient is functional

## 2023-01-24 NOTE — ASSESSMENT & PLAN NOTE
Lab Results   Component Value Date    EGFR 62 10/18/2022    EGFR 55 01/18/2022    EGFR 68 07/14/2021    CREATININE 1 01 10/18/2022    CREATININE 1 12 01/18/2022    CREATININE 0 95 07/14/2021   Chronic kidney disease stable continue same medications follow-up renal function laboratory work as scheduled every 6 months now

## 2023-04-28 ENCOUNTER — OFFICE VISIT (OUTPATIENT)
Dept: FAMILY MEDICINE CLINIC | Facility: CLINIC | Age: 88
End: 2023-04-28

## 2023-04-28 VITALS
RESPIRATION RATE: 18 BRPM | OXYGEN SATURATION: 94 % | SYSTOLIC BLOOD PRESSURE: 110 MMHG | HEART RATE: 72 BPM | TEMPERATURE: 98.3 F | WEIGHT: 165.4 LBS | DIASTOLIC BLOOD PRESSURE: 64 MMHG | BODY MASS INDEX: 29.3 KG/M2 | HEIGHT: 63 IN

## 2023-04-28 DIAGNOSIS — M25.471 ANKLE EDEMA, BILATERAL: ICD-10-CM

## 2023-04-28 DIAGNOSIS — K21.9 GASTROESOPHAGEAL REFLUX DISEASE WITHOUT ESOPHAGITIS: ICD-10-CM

## 2023-04-28 DIAGNOSIS — I10 ESSENTIAL HYPERTENSION: Primary | ICD-10-CM

## 2023-04-28 DIAGNOSIS — M15.9 OSTEOARTHRITIS, GENERALIZED: ICD-10-CM

## 2023-04-28 DIAGNOSIS — N18.31 STAGE 3A CHRONIC KIDNEY DISEASE (HCC): ICD-10-CM

## 2023-04-28 DIAGNOSIS — M25.472 ANKLE EDEMA, BILATERAL: ICD-10-CM

## 2023-04-28 NOTE — PROGRESS NOTES
Assessment/Plan:       Problem List Items Addressed This Visit        Digestive    Gastroesophageal reflux disease without esophagitis     GERD symptoms are stable continue with omeprazole 20 mg daily            Cardiovascular and Mediastinum    Essential hypertension - Primary     Blood pressure stable continue with lisinopril 5 mg            Musculoskeletal and Integument    Osteoarthritis, generalized     Generalized osteoarthritis with knee hip and back pain however he is stable he remains active  Continue with meloxicam as needed patient stopped at this point            Genitourinary    Stage 3a chronic kidney disease [facility])     Lab Results   Component Value Date    EGFR 62 10/18/2022    EGFR 55 01/18/2022    EGFR 68 07/14/2021    CREATININE 1 01 10/18/2022    CREATININE 1 12 01/18/2022    CREATININE 0 95 07/14/2021   Stable monitor laboratory work and repeat at next visit            Other    Ankle edema, bilateral     Right leg worse than left with edema and papular discolorations no other changes at this time              Subjective:      Patient ID: Neil Gray is a 80 y o  male  Follow-up evaluation doing well overall complains of loss of taste of his food      The following portions of the patient's history were reviewed and updated as appropriate: allergies, current medications, past family history, past medical history, past social history, past surgical history and problem list     Review of Systems   Constitutional: Negative for chills, fatigue and fever  HENT: Negative for congestion, nosebleeds, rhinorrhea, sinus pressure and sore throat  Eyes: Negative for discharge and redness  Respiratory: Negative for cough and shortness of breath  Cardiovascular: Positive for leg swelling  Negative for chest pain and palpitations  Gastrointestinal: Negative for abdominal pain, blood in stool and nausea  Endocrine: Negative for cold intolerance, heat intolerance and polyuria  "  Genitourinary: Negative for dysuria and frequency  Musculoskeletal: Negative for arthralgias, back pain and myalgias  Skin: Negative for rash  Neurological: Negative for dizziness, weakness and headaches  Hematological: Negative for adenopathy  Psychiatric/Behavioral: Negative for behavioral problems and sleep disturbance  The patient is not nervous/anxious  Objective:      /64 (BP Location: Left arm, Patient Position: Sitting, Cuff Size: Standard)   Pulse 72   Temp 98 3 °F (36 8 °C) (Temporal)   Resp 18   Ht 5' 3\" (1 6 m)   Wt 75 kg (165 lb 6 4 oz)   SpO2 94%   BMI 29 30 kg/m²        Physical Exam  Vitals and nursing note reviewed  Constitutional:       Appearance: Normal appearance  He is well-developed and normal weight  HENT:      Head: Normocephalic and atraumatic  Right Ear: Tympanic membrane, ear canal and external ear normal       Left Ear: Tympanic membrane, ear canal and external ear normal       Nose: Nose normal       Mouth/Throat:      Mouth: Mucous membranes are moist       Pharynx: Oropharynx is clear  Eyes:      General: No scleral icterus  Extraocular Movements: Extraocular movements intact  Conjunctiva/sclera: Conjunctivae normal       Pupils: Pupils are equal, round, and reactive to light  Neck:      Thyroid: No thyromegaly  Vascular: No JVD  Cardiovascular:      Rate and Rhythm: Normal rate and regular rhythm  Pulses: Normal pulses  Heart sounds: Normal heart sounds  No murmur heard  Pulmonary:      Effort: Pulmonary effort is normal       Breath sounds: Normal breath sounds  No wheezing or rales  Chest:      Chest wall: No tenderness  Abdominal:      General: Bowel sounds are normal  There is no distension  Palpations: Abdomen is soft  There is no mass  Tenderness: There is no abdominal tenderness  There is no guarding or rebound  Musculoskeletal:         General: No tenderness or deformity   Normal range of " motion  Cervical back: Normal range of motion and neck supple  Lymphadenopathy:      Cervical: No cervical adenopathy  Skin:     General: Skin is warm and dry  Capillary Refill: Capillary refill takes less than 2 seconds  Findings: No erythema or rash  Neurological:      General: No focal deficit present  Mental Status: He is alert and oriented to person, place, and time  Mental status is at baseline  Cranial Nerves: No cranial nerve deficit  Deep Tendon Reflexes: Reflexes are normal and symmetric  Reflexes normal    Psychiatric:         Mood and Affect: Mood normal          Behavior: Behavior normal          Thought Content: Thought content normal          Judgment: Judgment normal           Data:    Laboratory Results: I have personally reviewed the pertinent laboratory results/reports   Radiology/Other Diagnostic Testing Results: I have personally reviewed pertinent reports         Lab Results   Component Value Date    WBC 9 91 10/18/2022    HGB 13 0 10/18/2022    HCT 42 1 10/18/2022    MCV 94 10/18/2022     10/18/2022     Lab Results   Component Value Date    K 4 1 10/18/2022     10/18/2022    CO2 28 10/18/2022    BUN 12 10/18/2022    CREATININE 1 01 10/18/2022    GLUF 85 10/18/2022    CALCIUM 8 7 10/18/2022    AST 14 10/18/2022    ALT 14 10/18/2022    ALKPHOS 99 10/18/2022    EGFR 62 10/18/2022     Lab Results   Component Value Date    CHOLESTEROL 149 10/18/2022    CHOLESTEROL 174 07/14/2021    CHOLESTEROL 177 01/05/2021     Lab Results   Component Value Date    HDL 45 10/18/2022    HDL 57 07/14/2021    HDL 58 01/05/2021     Lab Results   Component Value Date    LDLCALC 89 10/18/2022    LDLCALC 97 07/14/2021    LDLCALC 105 (H) 01/05/2021     Lab Results   Component Value Date    TRIG 76 10/18/2022    TRIG 99 07/14/2021    TRIG 69 01/05/2021     No results found for: [place], [place]  Lab Results   Component Value Date    [id number] 1 050 10/18/2022     No results found for: HGBA1C  Lab Results   Component Value Date    PSA 5 4 (H) 07/14/2021       Mike Szymanski DO

## 2023-04-28 NOTE — ASSESSMENT & PLAN NOTE
Generalized osteoarthritis with knee hip and back pain however he is stable he remains active    Continue with meloxicam as needed patient stopped at this point

## 2023-04-28 NOTE — ASSESSMENT & PLAN NOTE
Lab Results   Component Value Date    EGFR 62 10/18/2022    EGFR 55 01/18/2022    EGFR 68 07/14/2021    CREATININE 1 01 10/18/2022    CREATININE 1 12 01/18/2022    CREATININE 0 95 07/14/2021   Stable monitor laboratory work and repeat at next visit

## 2023-07-11 DIAGNOSIS — K21.9 GASTROESOPHAGEAL REFLUX DISEASE WITHOUT ESOPHAGITIS: ICD-10-CM

## 2023-07-11 DIAGNOSIS — E78.2 MIXED HYPERLIPIDEMIA: ICD-10-CM

## 2023-07-11 DIAGNOSIS — I10 ESSENTIAL HYPERTENSION: ICD-10-CM

## 2023-07-11 RX ORDER — ROSUVASTATIN CALCIUM 5 MG/1
TABLET, COATED ORAL
Qty: 90 TABLET | Refills: 1 | Status: SHIPPED | OUTPATIENT
Start: 2023-07-11 | End: 2023-07-31 | Stop reason: SDUPTHER

## 2023-07-11 RX ORDER — OMEPRAZOLE 20 MG/1
CAPSULE, DELAYED RELEASE ORAL
Qty: 90 CAPSULE | Refills: 1 | Status: SHIPPED | OUTPATIENT
Start: 2023-07-11 | End: 2023-07-31 | Stop reason: SDUPTHER

## 2023-07-11 RX ORDER — LISINOPRIL 5 MG/1
TABLET ORAL
Qty: 90 TABLET | Refills: 1 | Status: SHIPPED | OUTPATIENT
Start: 2023-07-11 | End: 2023-07-31 | Stop reason: SDUPTHER

## 2023-07-25 ENCOUNTER — APPOINTMENT (OUTPATIENT)
Dept: LAB | Facility: CLINIC | Age: 88
End: 2023-07-25
Payer: MEDICARE

## 2023-07-25 DIAGNOSIS — M25.471 ANKLE EDEMA, BILATERAL: ICD-10-CM

## 2023-07-25 DIAGNOSIS — I10 ESSENTIAL HYPERTENSION: ICD-10-CM

## 2023-07-25 DIAGNOSIS — N18.31 STAGE 3A CHRONIC KIDNEY DISEASE (HCC): ICD-10-CM

## 2023-07-25 DIAGNOSIS — K21.9 GASTROESOPHAGEAL REFLUX DISEASE WITHOUT ESOPHAGITIS: ICD-10-CM

## 2023-07-25 DIAGNOSIS — M25.472 ANKLE EDEMA, BILATERAL: ICD-10-CM

## 2023-07-25 LAB
ALBUMIN SERPL BCP-MCNC: 3.4 G/DL (ref 3.5–5)
ALP SERPL-CCNC: 70 U/L (ref 46–116)
ALT SERPL W P-5'-P-CCNC: 14 U/L (ref 12–78)
ANION GAP SERPL CALCULATED.3IONS-SCNC: 3 MMOL/L
AST SERPL W P-5'-P-CCNC: 12 U/L (ref 5–45)
BASOPHILS # BLD AUTO: 0.07 THOUSANDS/ÂΜL (ref 0–0.1)
BASOPHILS NFR BLD AUTO: 1 % (ref 0–1)
BILIRUB SERPL-MCNC: 0.81 MG/DL (ref 0.2–1)
BUN SERPL-MCNC: 21 MG/DL (ref 5–25)
CALCIUM ALBUM COR SERPL-MCNC: 9.4 MG/DL (ref 8.3–10.1)
CALCIUM SERPL-MCNC: 8.9 MG/DL (ref 8.3–10.1)
CHLORIDE SERPL-SCNC: 107 MMOL/L (ref 96–108)
CHOLEST SERPL-MCNC: 136 MG/DL
CO2 SERPL-SCNC: 28 MMOL/L (ref 21–32)
CREAT SERPL-MCNC: 1.11 MG/DL (ref 0.6–1.3)
EOSINOPHIL # BLD AUTO: 0.28 THOUSAND/ÂΜL (ref 0–0.61)
EOSINOPHIL NFR BLD AUTO: 4 % (ref 0–6)
ERYTHROCYTE [DISTWIDTH] IN BLOOD BY AUTOMATED COUNT: 21.7 % (ref 11.6–15.1)
GFR SERPL CREATININE-BSD FRML MDRD: 55 ML/MIN/1.73SQ M
GLUCOSE P FAST SERPL-MCNC: 90 MG/DL (ref 65–99)
HCT VFR BLD AUTO: 32.9 % (ref 36.5–49.3)
HDLC SERPL-MCNC: 48 MG/DL
HGB BLD-MCNC: 9.3 G/DL (ref 12–17)
IMM GRANULOCYTES # BLD AUTO: 0.01 THOUSAND/UL (ref 0–0.2)
IMM GRANULOCYTES NFR BLD AUTO: 0 % (ref 0–2)
LDLC SERPL CALC-MCNC: 71 MG/DL (ref 0–100)
LYMPHOCYTES # BLD AUTO: 1.52 THOUSANDS/ÂΜL (ref 0.6–4.47)
LYMPHOCYTES NFR BLD AUTO: 22 % (ref 14–44)
MCH RBC QN AUTO: 20.5 PG (ref 26.8–34.3)
MCHC RBC AUTO-ENTMCNC: 28.3 G/DL (ref 31.4–37.4)
MCV RBC AUTO: 73 FL (ref 82–98)
MONOCYTES # BLD AUTO: 0.8 THOUSAND/ÂΜL (ref 0.17–1.22)
MONOCYTES NFR BLD AUTO: 12 % (ref 4–12)
NEUTROPHILS # BLD AUTO: 4.21 THOUSANDS/ÂΜL (ref 1.85–7.62)
NEUTS SEG NFR BLD AUTO: 61 % (ref 43–75)
NONHDLC SERPL-MCNC: 88 MG/DL
NRBC BLD AUTO-RTO: 0 /100 WBCS
PLATELET # BLD AUTO: 228 THOUSANDS/UL (ref 149–390)
POTASSIUM SERPL-SCNC: 4.5 MMOL/L (ref 3.5–5.3)
PROT SERPL-MCNC: 7 G/DL (ref 6.4–8.4)
RBC # BLD AUTO: 4.54 MILLION/UL (ref 3.88–5.62)
SODIUM SERPL-SCNC: 138 MMOL/L (ref 135–147)
TRIGL SERPL-MCNC: 83 MG/DL
TSH SERPL DL<=0.05 MIU/L-ACNC: 0.8 UIU/ML (ref 0.45–4.5)
WBC # BLD AUTO: 6.89 THOUSAND/UL (ref 4.31–10.16)

## 2023-07-25 PROCEDURE — 85025 COMPLETE CBC W/AUTO DIFF WBC: CPT

## 2023-07-25 PROCEDURE — 36415 COLL VENOUS BLD VENIPUNCTURE: CPT

## 2023-07-25 PROCEDURE — 80053 COMPREHEN METABOLIC PANEL: CPT

## 2023-07-25 PROCEDURE — 80061 LIPID PANEL: CPT

## 2023-07-25 PROCEDURE — 84443 ASSAY THYROID STIM HORMONE: CPT

## 2023-07-31 ENCOUNTER — OFFICE VISIT (OUTPATIENT)
Dept: FAMILY MEDICINE CLINIC | Facility: CLINIC | Age: 88
End: 2023-07-31
Payer: MEDICARE

## 2023-07-31 VITALS
HEIGHT: 63 IN | TEMPERATURE: 97.6 F | OXYGEN SATURATION: 95 % | WEIGHT: 164.8 LBS | SYSTOLIC BLOOD PRESSURE: 134 MMHG | DIASTOLIC BLOOD PRESSURE: 82 MMHG | BODY MASS INDEX: 29.2 KG/M2 | HEART RATE: 64 BPM | RESPIRATION RATE: 20 BRPM

## 2023-07-31 DIAGNOSIS — M25.472 ANKLE EDEMA, BILATERAL: ICD-10-CM

## 2023-07-31 DIAGNOSIS — Z00.00 MEDICARE ANNUAL WELLNESS VISIT, SUBSEQUENT: Primary | ICD-10-CM

## 2023-07-31 DIAGNOSIS — M15.9 OSTEOARTHRITIS, GENERALIZED: ICD-10-CM

## 2023-07-31 DIAGNOSIS — K21.9 GASTROESOPHAGEAL REFLUX DISEASE WITHOUT ESOPHAGITIS: ICD-10-CM

## 2023-07-31 DIAGNOSIS — I10 ESSENTIAL HYPERTENSION: ICD-10-CM

## 2023-07-31 DIAGNOSIS — E78.2 MIXED HYPERLIPIDEMIA: ICD-10-CM

## 2023-07-31 DIAGNOSIS — R97.20 ELEVATED PSA: ICD-10-CM

## 2023-07-31 DIAGNOSIS — M25.471 ANKLE EDEMA, BILATERAL: ICD-10-CM

## 2023-07-31 DIAGNOSIS — N18.31 STAGE 3A CHRONIC KIDNEY DISEASE (HCC): ICD-10-CM

## 2023-07-31 DIAGNOSIS — D50.9 IRON DEFICIENCY ANEMIA, UNSPECIFIED IRON DEFICIENCY ANEMIA TYPE: ICD-10-CM

## 2023-07-31 PROCEDURE — G0439 PPPS, SUBSEQ VISIT: HCPCS | Performed by: FAMILY MEDICINE

## 2023-07-31 PROCEDURE — 99214 OFFICE O/P EST MOD 30 MIN: CPT | Performed by: FAMILY MEDICINE

## 2023-07-31 RX ORDER — OMEPRAZOLE 20 MG/1
CAPSULE, DELAYED RELEASE ORAL
Qty: 90 CAPSULE | Refills: 1 | Status: SHIPPED | OUTPATIENT
Start: 2023-07-31

## 2023-07-31 RX ORDER — LISINOPRIL 5 MG/1
TABLET ORAL
Qty: 90 TABLET | Refills: 1 | Status: SHIPPED | OUTPATIENT
Start: 2023-07-31

## 2023-07-31 RX ORDER — ROSUVASTATIN CALCIUM 5 MG/1
TABLET, COATED ORAL
Qty: 90 TABLET | Refills: 1 | Status: SHIPPED | OUTPATIENT
Start: 2023-07-31

## 2023-07-31 NOTE — ASSESSMENT & PLAN NOTE
Lab Results   Component Value Date    EGFR 55 07/25/2023    EGFR 62 10/18/2022    EGFR 55 01/18/2022    CREATININE 1.11 07/25/2023    CREATININE 1.01 10/18/2022    CREATININE 1.12 01/18/2022   Recent laboratory work showing stable GFR at 55 with creatinine at 1.11 patient averages between 55 and 65.   No change in his medications currently maintain stable blood pressure control continue with lisinopril continue daily hydration encouraged and follow-up with me at next visit

## 2023-07-31 NOTE — PATIENT INSTRUCTIONS
Iron Rich Diet   WHAT YOU NEED TO KNOW:   An iron-rich diet includes foods that are good sources of iron. People need extra iron during childhood, adolescence (teenage years), and pregnancy. Iron is a mineral that your body needs to make hemoglobin. Hemoglobin is part of your blood and helps carry oxygen from your lungs to the rest of your body. Eat iron-rich foods and vitamin C every day to prevent iron deficiency anemia. Iron deficiency anemia can lead to other health problems in adults and growth or development problems in children. DISCHARGE INSTRUCTIONS:   Daily iron needs:   Males:      3to 1years old: 7 mg    3to 6years old: 10 mg    5to 15years old: 8 mg    15to 25years old: 11 mg    19 years and older: 8 mg    Females:      3to 1years old: 7 mg    3to 6years old: 10 mg    5to 15years old: 8 mg    15to 25years old: 15 mg    19 to 50 years: 18 mg    Over 46years old: 8 mg    Pregnant women:  27 mg    Foods that contain iron:   Meat, fish, and poultry are good sources of iron. They contain heme iron, a form of iron that your body absorbs very well. Fruit, vegetables, eggs, and grains such as pasta, rice, and cereal also contain iron. They contain nonheme iron, a form of iron that is not absorbed as well as heme iron. You can absorb more iron from these foods by eating a food that is high in vitamin C at the same time. You can also absorb more nonheme iron by eating a food from the meat, fish, and poultry group at the same time. Fish and shellfish contain some mercury, a metal that can be harmful to the body. Children and unborn babies are at higher risk for harm caused by mercury. Children and pregnant women should avoid eating fish high in mercury, such as shark and swordfish. They should also eat only fish that are lower in mercury, such as salmon, canned light tuna, and catfish. Limit the amount of low-mercury fish and shellfish you eat to less than 12 ounces per week.     Iron-rich foods: Foods that contain 2 mg or more per serving:      3 ounces of cooked beef (suresh, eye of round) or cooked turkey (dark meat)    ½ cup of beans (black, kidney, or lentil, or soybeans)    ½ cup of tofu    1 medium baked potato    1 cup of cooked artichoke or cooked spinach    ¾ cup of instant oatmeal    1 cup of corn flakes    Foods that contain 1 to 2 mg per serving:      3 ounces of chicken    3 ounces of pork    3 ounces of turkey (light meat)    3 ounces of light tuna    ½ cup of seedless, packed raisins    1 slice of whole-wheat or white bread       Good sources of vitamin C:  Eat a serving of vitamin C with any iron-rich food to help your body absorb more iron. The following fruits and vegetables are good sources of vitamin C:  1 cup of fresh orange juice (124 mg) or pink grapefruit juice (83 mg)    1 cup of strawberries (106 mg)    1 cup of diced cantaloupe (68 mg)    1 cup of sweet yellow pepper (283 mg)    1 cup of fresh, boiled broccoli (116 mg) or cooked brussels sprouts (97 mg)    1 cup of kale (53 mg)    1 cup of tomato juice (45 mg)       Other guidelines to follow:   Tea and coffee can decrease the amount of iron that your body absorbs from iron-rich foods. Drink coffee and tea separately from meals that contain iron-rich foods. Have foods and liquids high in calcium separately from iron-rich foods. Calcium prevents iron from being absorbed. Cow's milk and products made from it, such as cheese and yogurt, are high in calcium. Children older than 1 year only need about 24 ounces of cow's milk each day. Other foods high in calcium include leafy greens, green vegetables, almonds, and canned sardines. © Copyright [employer] 2022 Information is for End User's use only and may not be sold, redistributed or otherwise used for commercial purposes. The above information is an  only. It is not intended as medical advice for individual conditions or treatments.  Talk to your doctor, nurse or pharmacist before following any medical regimen to see if it is safe and effective for you.

## 2023-07-31 NOTE — ASSESSMENT & PLAN NOTE
Overall generalized osteoarthritis with joint pain at any given area in his body quite frequently but he is managing on a daily basis to continue to stretch and exercise at home and remains independent but he has support from his daughter who lives nearby

## 2023-07-31 NOTE — ASSESSMENT & PLAN NOTE
Longstanding bilateral ankle edema no need for additional treatment patient will elevate legs when possible and use compression socks as needed

## 2023-07-31 NOTE — ASSESSMENT & PLAN NOTE
Anemia possibly iron deficiency or from blood loss he notes that he had difficulty with constipation off and on over several years but within the last 1 to 2 months he has had to do more straining and noticed a lot of blood in the toilet. He refuses digital rectal for Hemoccult exam today I recommend at least a CT scan. Not want any work-up at this point and states that his bowel movements have returned to normal and he does not see blood.   I will at least reorder a CBC test and iron levels contract this again

## 2023-07-31 NOTE — PROGRESS NOTES
Assessment and Plan:     Problem List Items Addressed This Visit        Digestive    Gastroesophageal reflux disease without esophagitis     GERD symptoms are stable continuing with omeprazole 20 mg daily         Relevant Medications    omeprazole (PriLOSEC) 20 mg delayed release capsule       Cardiovascular and Mediastinum    Essential hypertension     GERD symptoms are stable continuing with lisinopril 5 mg tablets         Relevant Medications    lisinopril (ZESTRIL) 5 mg tablet       Musculoskeletal and Integument    Osteoarthritis, generalized     Overall generalized osteoarthritis with joint pain at any given area in his body quite frequently but he is managing on a daily basis to continue to stretch and exercise at home and remains independent but he has support from his daughter who lives nearby            Genitourinary    Stage 3a chronic kidney disease [facility])     Lab Results   Component Value Date    EGFR 55 07/25/2023    EGFR 62 10/18/2022    EGFR 55 01/18/2022    CREATININE 1.11 07/25/2023    CREATININE 1.01 10/18/2022    CREATININE 1.12 01/18/2022   Recent laboratory work showing stable GFR at 55 with creatinine at 1.11 patient averages between 55 and 65.   No change in his medications currently maintain stable blood pressure control continue with lisinopril continue daily hydration encouraged and follow-up with me at next visit            Other    Mixed hyperlipidemia    Relevant Medications    rosuvastatin (CRESTOR) 5 mg tablet    Medicare annual wellness visit, subsequent - Primary    Elevated PSA     Stable overall no change in current medications follow-up PSA yearly if needed at this point not necessary         Ankle edema, bilateral     Longstanding bilateral ankle edema no need for additional treatment patient will elevate legs when possible and use compression socks as needed         Iron deficiency anemia     Anemia possibly iron deficiency or from blood loss he notes that he had difficulty with constipation off and on over several years but within the last 1 to 2 months he has had to do more straining and noticed a lot of blood in the toilet. He refuses digital rectal for Hemoccult exam today I recommend at least a CT scan. Not want any work-up at this point and states that his bowel movements have returned to normal and he does not see blood. I will at least reorder a CBC test and iron levels contract this again         Relevant Orders    Iron Panel (Includes Ferritin, Iron Sat%, Iron, and TIBC)    CBC and differential        Preventive health issues were discussed with patient, and age appropriate screening tests were ordered as noted in patient's After Visit Summary. Personalized health advice and appropriate referrals for health education or preventive services given if needed, as noted in patient's After Visit Summary. History of Present Illness:     Patient presents for a Medicare Wellness Visit    Patient presents today for follow-up evaluation and review of laboratory work he has been doing relatively well     Patient Care Team:  Saqib Huntley DO as PCP - General (Family Medicine)     Review of Systems:     Review of Systems   Constitutional: Negative for chills and fever. HENT: Negative for ear pain and sore throat. Eyes: Negative for pain and visual disturbance. Respiratory: Negative for cough and shortness of breath. Cardiovascular: Negative for chest pain and palpitations. Gastrointestinal: Negative for abdominal pain and vomiting. Genitourinary: Negative for dysuria and hematuria. Musculoskeletal: Negative for arthralgias and back pain. Skin: Negative for color change and rash. Neurological: Negative for seizures and syncope. All other systems reviewed and are negative.        Problem List:     Patient Active Problem List   Diagnosis   • Essential hypertension   • Mixed hyperlipidemia   • Gastroesophageal reflux disease without esophagitis   • Medicare annual wellness visit, subsequent   • Elevated PSA   • Ankle edema, bilateral   • Acute bilateral low back pain without sciatica   • Arthritis of right shoulder region   • Fall   • Contusion of head   • Arthritis of left shoulder region   • Osteoarthritis, generalized   • Stage 3a chronic kidney disease (HCC)   • Blood in stool, so   • Iron deficiency anemia      Past Medical and Surgical History:     Past Medical History:   Diagnosis Date   • Elevated cholesterol    • GERD (gastroesophageal reflux disease)    • Hypertension    • Sleep apnea      Past Surgical History:   Procedure Laterality Date   • APPENDECTOMY        Family History:     Family History   Problem Relation Age of Onset   • No Known Problems Mother    • No Known Problems Father       Social History:     Social History     Socioeconomic History   • Marital status: /Civil Union     Spouse name: None   • Number of children: None   • Years of education: None   • Highest education level: None   Occupational History   • None   Tobacco Use   • Smoking status: Former     Packs/day: 0.25     Years: 10.00     Total pack years: 2.50     Types: Cigarettes     Quit date:      Years since quittin.6   • Smokeless tobacco: Never   Vaping Use   • Vaping Use: Never used   Substance and Sexual Activity   • Alcohol use: Yes     Comment: glass of wine daily   • Drug use: No   • Sexual activity: None   Other Topics Concern   • None   Social History Narrative   • None     Social Determinants of Health     Financial Resource Strain: Low Risk  (2023)    Overall Financial Resource Strain (CARDIA)    • Difficulty of Paying Living Expenses: Not very hard   Food Insecurity: Not on file   Transportation Needs: No Transportation Needs (2023)    PRAPARE - Transportation    • Lack of Transportation (Medical): No    • Lack of Transportation (Non-Medical):  No   Physical Activity: Not on file   Stress: Not on file   Social Connections: Not on file   Intimate Partner Violence: Not on file   Housing Stability: Not on file      Medications and Allergies:     Current Outpatient Medications   Medication Sig Dispense Refill   • lisinopril (ZESTRIL) 5 mg tablet Daily 90 tablet 1   • omeprazole (PriLOSEC) 20 mg delayed release capsule Daily 90 capsule 1   • rosuvastatin (CRESTOR) 5 mg tablet Daily 90 tablet 1   • meloxicam (MOBIC) 15 mg tablet Take 1 tablet (15 mg total) by mouth daily (Patient not taking: Reported on 9/28/2021) 30 tablet 0     No current facility-administered medications for this visit. No Known Allergies   Immunizations:     Immunization History   Administered Date(s) Administered   • COVID-19 J&J (Isidoro) vaccine 0.5 mL 04/09/2021   • INFLUENZA 09/26/2018   • Influenza, high dose seasonal 0.7 mL 09/12/2019, 10/08/2020, 10/25/2022   • Tdap 09/20/2021      Health Maintenance: There are no preventive care reminders to display for this patient. Topic Date Due   • Pneumococcal Vaccine: 65+ Years (1 - PCV) Never done   • COVID-19 Vaccine (2 - Booster for Isidoro series) 06/04/2021   • Influenza Vaccine (1) 09/01/2023      Medicare Screening Tests and Risk Assessments:     Aaliyah Chavira is here for his Subsequent Wellness visit. Health Risk Assessment:   Patient rates overall health as good. Patient feels that their physical health rating is same. Patient is satisfied with their life. Hearing was rated as same. Patient feels that their emotional and mental health rating is same. Patients states they are never, rarely angry. Patient states they are sometimes unusually tired/fatigued. Pain experienced in the last 7 days has been none. Patient states that he has experienced no weight loss or gain in last 6 months. Depression Screening:   PHQ-2 Score: 2      Fall Risk Screening: In the past year, patient has experienced: no history of falling in past year      Home Safety:  Patient does not have trouble with stairs inside or outside of their home. Patient has working smoke alarms and has working carbon monoxide detector. Home safety hazards include: none. Nutrition:   Current diet is Regular. Medications:   Patient is not currently taking any over-the-counter supplements. Patient is able to manage medications. Activities of Daily Living (ADLs)/Instrumental Activities of Daily Living (IADLs):   Walk and transfer into and out of bed and chair?: Yes  Dress and groom yourself?: Yes    Bathe or shower yourself?: Yes    Feed yourself? Yes  Do your laundry/housekeeping?: Yes  Manage your money, pay your bills and track your expenses?: Yes  Make your own meals?: Yes    Do your own shopping?: Yes    Previous Hospitalizations:   Any hospitalizations or ED visits within the last 12 months?: No      PREVENTIVE SCREENINGS      Cardiovascular Screening:    General: Screening Not Indicated and History Lipid Disorder      Diabetes Screening:     General: Screening Current      Colorectal Cancer Screening:     General: Screening Not Indicated      Prostate Cancer Screening:    General: Screening Not Indicated      Abdominal Aortic Aneurysm (AAA) Screening:    Risk factors include: tobacco use        Lung Cancer Screening:     General: Screening Not Indicated    Screening, Brief Intervention, and Referral to Treatment (SBIRT)    Screening  Typical number of drinks in a day: 0  Typical number of drinks in a week: 0  Interpretation: Low risk drinking behavior. Single Item Drug Screening:  How often have you used an illegal drug (including marijuana) or a prescription medication for non-medical reasons in the past year? never    Single Item Drug Screen Score: 0  Interpretation: Negative screen for possible drug use disorder    No results found.      Physical Exam:     /82 (BP Location: Left arm, Patient Position: Sitting, Cuff Size: Standard)   Pulse 64   Temp 97.6 °F (36.4 °C) (Temporal)   Resp 20   Ht 5' 3" (1.6 m)   Wt 74.8 kg (164 lb 12.8 oz)   SpO2 95%   BMI 29.19 kg/m²     Physical Exam  Vitals and nursing note reviewed. Constitutional:       General: He is not in acute distress. Appearance: Normal appearance. He is well-developed and normal weight. HENT:      Head: Normocephalic and atraumatic. Right Ear: Tympanic membrane, ear canal and external ear normal.      Left Ear: Tympanic membrane, ear canal and external ear normal.      Nose: Nose normal.      Mouth/Throat:      Mouth: Mucous membranes are moist.      Pharynx: Oropharynx is clear. Eyes:      Extraocular Movements: Extraocular movements intact. Conjunctiva/sclera: Conjunctivae normal.      Pupils: Pupils are equal, round, and reactive to light. Cardiovascular:      Rate and Rhythm: Normal rate and regular rhythm. Heart sounds: Normal heart sounds. No murmur heard. Pulmonary:      Effort: Pulmonary effort is normal. No respiratory distress. Breath sounds: Normal breath sounds. Abdominal:      Palpations: Abdomen is soft. Tenderness: There is no abdominal tenderness. Musculoskeletal:         General: No swelling. Cervical back: Neck supple. Right lower leg: Edema present. Left lower leg: Edema present. Skin:     General: Skin is warm and dry. Capillary Refill: Capillary refill takes less than 2 seconds. Neurological:      General: No focal deficit present. Mental Status: He is alert. Mental status is at baseline. Psychiatric:         Mood and Affect: Mood normal.         Behavior: Behavior normal.         Thought Content:  Thought content normal.         Judgment: Judgment normal.          Shayy Barrett,

## 2023-07-31 NOTE — ASSESSMENT & PLAN NOTE
Stable overall no change in current medications follow-up PSA yearly if needed at this point not necessary

## 2023-08-04 NOTE — ASSESSMENT & PLAN NOTE
Mixed hyperlipidemia stable with Crestor 5 mg tablets continue follow-up lipid profile stay with heart healthy diet Hypertension

## 2023-08-31 ENCOUNTER — APPOINTMENT (OUTPATIENT)
Dept: LAB | Facility: CLINIC | Age: 88
End: 2023-08-31
Payer: MEDICARE

## 2023-08-31 LAB
BASOPHILS # BLD AUTO: 0.05 THOUSANDS/ÂΜL (ref 0–0.1)
BASOPHILS NFR BLD AUTO: 1 % (ref 0–1)
EOSINOPHIL # BLD AUTO: 0.23 THOUSAND/ÂΜL (ref 0–0.61)
EOSINOPHIL NFR BLD AUTO: 3 % (ref 0–6)
ERYTHROCYTE [DISTWIDTH] IN BLOOD BY AUTOMATED COUNT: 22.2 % (ref 11.6–15.1)
FERRITIN SERPL-MCNC: 9 NG/ML (ref 24–336)
HCT VFR BLD AUTO: 34.6 % (ref 36.5–49.3)
HGB BLD-MCNC: 10 G/DL (ref 12–17)
IMM GRANULOCYTES # BLD AUTO: 0.02 THOUSAND/UL (ref 0–0.2)
IMM GRANULOCYTES NFR BLD AUTO: 0 % (ref 0–2)
IRON SATN MFR SERPL: 5 % (ref 15–50)
IRON SERPL-MCNC: 21 UG/DL (ref 50–212)
LYMPHOCYTES # BLD AUTO: 1.54 THOUSANDS/ÂΜL (ref 0.6–4.47)
LYMPHOCYTES NFR BLD AUTO: 21 % (ref 14–44)
MCH RBC QN AUTO: 20.9 PG (ref 26.8–34.3)
MCHC RBC AUTO-ENTMCNC: 28.9 G/DL (ref 31.4–37.4)
MCV RBC AUTO: 72 FL (ref 82–98)
MONOCYTES # BLD AUTO: 0.71 THOUSAND/ÂΜL (ref 0.17–1.22)
MONOCYTES NFR BLD AUTO: 10 % (ref 4–12)
NEUTROPHILS # BLD AUTO: 4.65 THOUSANDS/ÂΜL (ref 1.85–7.62)
NEUTS SEG NFR BLD AUTO: 65 % (ref 43–75)
NRBC BLD AUTO-RTO: 0 /100 WBCS
PLATELET # BLD AUTO: 225 THOUSANDS/UL (ref 149–390)
RBC # BLD AUTO: 4.79 MILLION/UL (ref 3.88–5.62)
TIBC SERPL-MCNC: 420 UG/DL (ref 250–450)
UIBC SERPL-MCNC: 399 UG/DL (ref 155–355)
WBC # BLD AUTO: 7.2 THOUSAND/UL (ref 4.31–10.16)

## 2023-09-01 ENCOUNTER — TELEPHONE (OUTPATIENT)
Dept: FAMILY MEDICINE CLINIC | Facility: CLINIC | Age: 88
End: 2023-09-01

## 2023-09-01 DIAGNOSIS — D50.9 IRON DEFICIENCY ANEMIA, UNSPECIFIED IRON DEFICIENCY ANEMIA TYPE: Primary | ICD-10-CM

## 2023-11-08 ENCOUNTER — OFFICE VISIT (OUTPATIENT)
Dept: FAMILY MEDICINE CLINIC | Facility: CLINIC | Age: 88
End: 2023-11-08
Payer: MEDICARE

## 2023-11-08 VITALS
SYSTOLIC BLOOD PRESSURE: 130 MMHG | BODY MASS INDEX: 30.19 KG/M2 | OXYGEN SATURATION: 98 % | DIASTOLIC BLOOD PRESSURE: 78 MMHG | HEIGHT: 63 IN | TEMPERATURE: 98.6 F | RESPIRATION RATE: 18 BRPM | WEIGHT: 170.4 LBS | HEART RATE: 82 BPM

## 2023-11-08 DIAGNOSIS — I10 ESSENTIAL HYPERTENSION: ICD-10-CM

## 2023-11-08 DIAGNOSIS — K21.9 GASTROESOPHAGEAL REFLUX DISEASE WITHOUT ESOPHAGITIS: Primary | ICD-10-CM

## 2023-11-08 DIAGNOSIS — D50.9 IRON DEFICIENCY ANEMIA, UNSPECIFIED IRON DEFICIENCY ANEMIA TYPE: ICD-10-CM

## 2023-11-08 DIAGNOSIS — E78.2 MIXED HYPERLIPIDEMIA: ICD-10-CM

## 2023-11-08 DIAGNOSIS — N18.31 STAGE 3A CHRONIC KIDNEY DISEASE (HCC): ICD-10-CM

## 2023-11-08 DIAGNOSIS — Z23 ENCOUNTER FOR IMMUNIZATION: ICD-10-CM

## 2023-11-08 PROCEDURE — G0008 ADMIN INFLUENZA VIRUS VAC: HCPCS

## 2023-11-08 PROCEDURE — 90662 IIV NO PRSV INCREASED AG IM: CPT

## 2023-11-08 PROCEDURE — 99214 OFFICE O/P EST MOD 30 MIN: CPT | Performed by: FAMILY MEDICINE

## 2023-11-08 NOTE — ASSESSMENT & PLAN NOTE
Lab Results   Component Value Date    EGFR 55 07/25/2023    EGFR 62 10/18/2022    EGFR 55 01/18/2022    CREATININE 1.11 07/25/2023    CREATININE 1.01 10/18/2022    CREATININE 1.12 01/18/2022     Function stable monitor laboratory work at next visit continue with daily hydration

## 2023-11-08 NOTE — PROGRESS NOTES
Assessment/Plan:       Problem List Items Addressed This Visit          Digestive    Gastroesophageal reflux disease without esophagitis - Primary     Patient is stable no indigestion no blood in stool at this time continue with omeprazole daily            Cardiovascular and Mediastinum    Essential hypertension     Hypertension stable continue with lisinopril follow-up as scheduled at next visit            Genitourinary    Stage 3a chronic kidney disease ([address])     Lab Results   Component Value Date    EGFR 55 07/25/2023    EGFR 62 10/18/2022    EGFR 55 01/18/2022    CREATININE 1.11 07/25/2023    CREATININE 1.01 10/18/2022    CREATININE 1.12 01/18/2022   Function stable monitor laboratory work at next visit continue with daily hydration            Other    Mixed hyperlipidemia     Stable continue with Crestor follow-up lipid profile next visit         Iron deficiency anemia     Patient is taking iron supplements daily now repeat iron level for next visit repeat CBC              Subjective:      Patient ID: Matti Watts is a 80 y.o. male. Follow-up evaluation discussed laboratory work and iron deficiency        The following portions of the patient's history were reviewed and updated as appropriate: allergies, current medications, past family history, past medical history, past social history, past surgical history and problem list.    Review of Systems   Constitutional:  Negative for chills, fatigue and fever. HENT:  Negative for congestion, nosebleeds, rhinorrhea, sinus pressure and sore throat. Eyes:  Negative for discharge and redness. Respiratory:  Negative for cough and shortness of breath. Cardiovascular:  Positive for leg swelling. Negative for chest pain and palpitations. Gastrointestinal:  Negative for abdominal pain, blood in stool and nausea. Endocrine: Negative for cold intolerance, heat intolerance and polyuria. Genitourinary:  Negative for dysuria and frequency. Musculoskeletal:  Positive for arthralgias. Negative for back pain and myalgias. Skin:  Negative for rash. Neurological:  Negative for dizziness, weakness and headaches. Hematological:  Negative for adenopathy. Psychiatric/Behavioral:  Negative for behavioral problems and sleep disturbance. The patient is not nervous/anxious. Objective:      /78 (BP Location: Left arm, Patient Position: Sitting, Cuff Size: Large)   Pulse 82   Temp 98.6 °F (37 °C) (Tympanic)   Resp 18   Ht 5' 3" (1.6 m)   Wt 77.3 kg (170 lb 6.4 oz)   SpO2 98%   BMI 30.19 kg/m²        Physical Exam  Vitals and nursing note reviewed. Constitutional:       Appearance: Normal appearance. He is well-developed. HENT:      Head: Normocephalic and atraumatic. Right Ear: External ear normal.      Left Ear: External ear normal.      Nose: Nose normal.      Mouth/Throat:      Mouth: Mucous membranes are moist.   Eyes:      General: No scleral icterus. Conjunctiva/sclera: Conjunctivae normal.      Pupils: Pupils are equal, round, and reactive to light. Neck:      Thyroid: No thyromegaly. Vascular: No JVD. Cardiovascular:      Rate and Rhythm: Normal rate and regular rhythm. Pulses: Normal pulses. Heart sounds: Normal heart sounds. No murmur heard. Pulmonary:      Effort: Pulmonary effort is normal.      Breath sounds: Normal breath sounds. No wheezing or rales. Chest:      Chest wall: No tenderness. Abdominal:      General: Bowel sounds are normal. There is no distension. Palpations: Abdomen is soft. There is no mass. Tenderness: There is no abdominal tenderness. There is no guarding or rebound. Musculoskeletal:         General: No tenderness or deformity. Normal range of motion. Cervical back: Normal range of motion and neck supple. Lymphadenopathy:      Cervical: No cervical adenopathy. Skin:     General: Skin is warm and dry.       Capillary Refill: Capillary refill takes less than 2 seconds. Findings: No erythema or rash. Neurological:      General: No focal deficit present. Mental Status: He is alert and oriented to person, place, and time. Mental status is at baseline. Cranial Nerves: No cranial nerve deficit. Deep Tendon Reflexes: Reflexes are normal and symmetric. Reflexes normal.   Psychiatric:         Mood and Affect: Mood normal.         Behavior: Behavior normal.         Thought Content: Thought content normal.         Judgment: Judgment normal.          Data:    Laboratory Results: I have personally reviewed the pertinent laboratory results/reports   Radiology/Other Diagnostic Testing Results: I have personally reviewed pertinent reports.        Lab Results   Component Value Date    WBC 7.20 08/31/2023    HGB 10.0 (L) 08/31/2023    HCT 34.6 (L) 08/31/2023    MCV 72 (L) 08/31/2023     08/31/2023     Lab Results   Component Value Date    K 4.5 07/25/2023     07/25/2023    CO2 28 07/25/2023    BUN 21 07/25/2023    CREATININE 1.11 07/25/2023    GLUF 90 07/25/2023    CALCIUM 8.9 07/25/2023    CORRECTEDCA 9.4 07/25/2023    AST 12 07/25/2023    ALT 14 07/25/2023    ALKPHOS 70 07/25/2023    EGFR 55 07/25/2023     Lab Results   Component Value Date    CHOLESTEROL 136 07/25/2023    CHOLESTEROL 149 10/18/2022    CHOLESTEROL 174 07/14/2021     Lab Results   Component Value Date    HDL 48 07/25/2023    HDL 45 10/18/2022    HDL 57 07/14/2021     Lab Results   Component Value Date    LDLCALC 71 07/25/2023    LDLCALC 89 10/18/2022    LDLCALC 97 07/14/2021     Lab Results   Component Value Date    TRIG 83 07/25/2023    TRIG 76 10/18/2022    TRIG 99 07/14/2021     No results found for: "CHOLHDL"  Lab Results   Component Value Date    [id number] 0.798 07/25/2023     No results found for: "HGBA1C"  Lab Results   Component Value Date    PSA 5.4 (H) 07/14/2021       Soha Weaver DO

## 2023-12-12 DIAGNOSIS — E78.2 MIXED HYPERLIPIDEMIA: ICD-10-CM

## 2023-12-12 DIAGNOSIS — I10 ESSENTIAL HYPERTENSION: ICD-10-CM

## 2023-12-12 DIAGNOSIS — K21.9 GASTROESOPHAGEAL REFLUX DISEASE WITHOUT ESOPHAGITIS: ICD-10-CM

## 2023-12-12 RX ORDER — OMEPRAZOLE 20 MG/1
CAPSULE, DELAYED RELEASE ORAL
Qty: 90 CAPSULE | Refills: 3 | Status: SHIPPED | OUTPATIENT
Start: 2023-12-12

## 2023-12-12 RX ORDER — ROSUVASTATIN CALCIUM 5 MG/1
TABLET, COATED ORAL
Qty: 90 TABLET | Refills: 3 | Status: SHIPPED | OUTPATIENT
Start: 2023-12-12

## 2023-12-12 RX ORDER — LISINOPRIL 5 MG/1
TABLET ORAL
Qty: 90 TABLET | Refills: 3 | Status: SHIPPED | OUTPATIENT
Start: 2023-12-12

## 2024-02-08 ENCOUNTER — OFFICE VISIT (OUTPATIENT)
Dept: FAMILY MEDICINE CLINIC | Facility: CLINIC | Age: 89
End: 2024-02-08
Payer: MEDICARE

## 2024-02-08 VITALS
BODY MASS INDEX: 29.91 KG/M2 | DIASTOLIC BLOOD PRESSURE: 68 MMHG | WEIGHT: 168.8 LBS | HEART RATE: 71 BPM | TEMPERATURE: 98 F | RESPIRATION RATE: 20 BRPM | OXYGEN SATURATION: 98 % | SYSTOLIC BLOOD PRESSURE: 122 MMHG | HEIGHT: 63 IN

## 2024-02-08 DIAGNOSIS — M25.472 ANKLE EDEMA, BILATERAL: ICD-10-CM

## 2024-02-08 DIAGNOSIS — K21.9 GASTROESOPHAGEAL REFLUX DISEASE WITHOUT ESOPHAGITIS: ICD-10-CM

## 2024-02-08 DIAGNOSIS — D50.9 IRON DEFICIENCY ANEMIA, UNSPECIFIED IRON DEFICIENCY ANEMIA TYPE: ICD-10-CM

## 2024-02-08 DIAGNOSIS — I10 ESSENTIAL HYPERTENSION: ICD-10-CM

## 2024-02-08 DIAGNOSIS — N18.31 STAGE 3A CHRONIC KIDNEY DISEASE (HCC): ICD-10-CM

## 2024-02-08 DIAGNOSIS — E78.2 MIXED HYPERLIPIDEMIA: Primary | ICD-10-CM

## 2024-02-08 DIAGNOSIS — M25.471 ANKLE EDEMA, BILATERAL: ICD-10-CM

## 2024-02-08 DIAGNOSIS — M15.9 OSTEOARTHRITIS, GENERALIZED: ICD-10-CM

## 2024-02-08 PROCEDURE — 99214 OFFICE O/P EST MOD 30 MIN: CPT | Performed by: FAMILY MEDICINE

## 2024-02-08 NOTE — PROGRESS NOTES
Assessment/Plan:       Problem List Items Addressed This Visit          Digestive    Gastroesophageal reflux disease without esophagitis     GERD symptoms stable continue omeprazole daily            Cardiovascular and Mediastinum    Essential hypertension     Blood pressure stable control continue lisinopril 5 mg daily            Musculoskeletal and Integument    Osteoarthritis, generalized     Osteoarthritis is stable continue with daily exercise meloxicam.  Can be used as needed            Genitourinary    Stage 3a chronic kidney disease (HCC)     Lab Results   Component Value Date    EGFR 55 07/25/2023    EGFR 62 10/18/2022    EGFR 55 01/18/2022    CREATININE 1.11 07/25/2023    CREATININE 1.01 10/18/2022    CREATININE 1.12 01/18/2022   Stable repeat CMP at next visit in 3 months         Relevant Orders    CBC and differential    Comprehensive metabolic panel    Lipid panel    TSH, 3rd generation with Free T4 reflex    Iron Panel (Includes Ferritin, Iron Sat%, Iron, and TIBC)       Other    Mixed hyperlipidemia - Primary    Relevant Orders    CBC and differential    Comprehensive metabolic panel    Lipid panel    TSH, 3rd generation with Free T4 reflex    Iron Panel (Includes Ferritin, Iron Sat%, Iron, and TIBC)    Ankle edema, bilateral     Continue with compression socks         Iron deficiency anemia     Check iron level now continue iron supplement daily         Relevant Orders    CBC and differential    Comprehensive metabolic panel    Lipid panel    TSH, 3rd generation with Free T4 reflex    Iron Panel (Includes Ferritin, Iron Sat%, Iron, and TIBC)         Subjective:      Patient ID: Kevin Rodriguez is a 89 y.o. male.    3-month follow-up evaluation patient doing well overall        The following portions of the patient's history were reviewed and updated as appropriate: allergies, current medications, past family history, past medical history, past social history, past surgical history and problem  "list.    Review of Systems   Constitutional:  Negative for chills, fatigue and fever.        Loss of taste   HENT:  Negative for congestion, nosebleeds, rhinorrhea, sinus pressure and sore throat.    Eyes:  Negative for discharge and redness.   Respiratory:  Negative for cough and shortness of breath.    Cardiovascular:  Negative for chest pain, palpitations and leg swelling.   Gastrointestinal:  Negative for abdominal pain, blood in stool and nausea.   Endocrine: Negative for cold intolerance, heat intolerance and polyuria.   Genitourinary:  Negative for dysuria and frequency.   Musculoskeletal:  Negative for arthralgias, back pain and myalgias.   Skin:  Negative for rash.   Neurological:  Negative for dizziness, weakness and headaches.   Hematological:  Negative for adenopathy.   Psychiatric/Behavioral:  Negative for behavioral problems and sleep disturbance. The patient is not nervous/anxious.          Objective:      /68 (BP Location: Left arm, Patient Position: Sitting, Cuff Size: Large)   Pulse 71   Temp 98 °F (36.7 °C) (Temporal)   Resp 20   Ht 5' 3\" (1.6 m)   Wt 76.6 kg (168 lb 12.8 oz)   SpO2 98%   BMI 29.90 kg/m²        Physical Exam  Vitals and nursing note reviewed.   Constitutional:       Appearance: Normal appearance. He is well-developed.   HENT:      Head: Normocephalic and atraumatic.      Right Ear: Tympanic membrane and external ear normal.      Left Ear: Tympanic membrane and external ear normal.      Nose: Nose normal.   Eyes:      General: No scleral icterus.     Conjunctiva/sclera: Conjunctivae normal.      Pupils: Pupils are equal, round, and reactive to light.   Neck:      Thyroid: No thyromegaly.      Vascular: No JVD.   Cardiovascular:      Rate and Rhythm: Normal rate and regular rhythm.      Pulses: Normal pulses.      Heart sounds: Normal heart sounds. No murmur heard.  Pulmonary:      Effort: Pulmonary effort is normal.      Breath sounds: Normal breath sounds. No wheezing " or rales.   Chest:      Chest wall: No tenderness.   Abdominal:      General: Bowel sounds are normal. There is no distension.      Palpations: Abdomen is soft. There is no mass.      Tenderness: There is no abdominal tenderness. There is no guarding or rebound.   Musculoskeletal:         General: No tenderness or deformity. Normal range of motion.      Cervical back: Normal range of motion and neck supple.      Right lower leg: Edema present.      Left lower leg: Edema present.   Lymphadenopathy:      Cervical: No cervical adenopathy.   Skin:     General: Skin is warm and dry.      Findings: No erythema or rash.   Neurological:      General: No focal deficit present.      Mental Status: He is alert and oriented to person, place, and time. Mental status is at baseline.      Cranial Nerves: No cranial nerve deficit.      Deep Tendon Reflexes: Reflexes are normal and symmetric. Reflexes normal.   Psychiatric:         Mood and Affect: Mood normal.         Behavior: Behavior normal.         Thought Content: Thought content normal.         Judgment: Judgment normal.          Data:    Laboratory Results: I have personally reviewed the pertinent laboratory results/reports   Radiology/Other Diagnostic Testing Results: I have personally reviewed pertinent reports.       Lab Results   Component Value Date    WBC 7.20 08/31/2023    HGB 10.0 (L) 08/31/2023    HCT 34.6 (L) 08/31/2023    MCV 72 (L) 08/31/2023     08/31/2023     Lab Results   Component Value Date    K 4.5 07/25/2023     07/25/2023    CO2 28 07/25/2023    BUN 21 07/25/2023    CREATININE 1.11 07/25/2023    GLUF 90 07/25/2023    CALCIUM 8.9 07/25/2023    CORRECTEDCA 9.4 07/25/2023    AST 12 07/25/2023    ALT 14 07/25/2023    ALKPHOS 70 07/25/2023    EGFR 55 07/25/2023     Lab Results   Component Value Date    CHOLESTEROL 136 07/25/2023    CHOLESTEROL 149 10/18/2022    CHOLESTEROL 174 07/14/2021     Lab Results   Component Value Date    HDL 48 07/25/2023  "   HDL 45 10/18/2022    HDL 57 07/14/2021     Lab Results   Component Value Date    LDLCALC 71 07/25/2023    LDLCALC 89 10/18/2022    LDLCALC 97 07/14/2021     Lab Results   Component Value Date    TRIG 83 07/25/2023    TRIG 76 10/18/2022    TRIG 99 07/14/2021     No results found for: \"CHOLHDL\"  Lab Results   Component Value Date    [id number] 0.798 07/25/2023     No results found for: \"HGBA1C\"  Lab Results   Component Value Date    PSA 5.4 (H) 07/14/2021       Parth Torres, DO      "

## 2024-02-08 NOTE — ASSESSMENT & PLAN NOTE
Lab Results   Component Value Date    EGFR 55 07/25/2023    EGFR 62 10/18/2022    EGFR 55 01/18/2022    CREATININE 1.11 07/25/2023    CREATININE 1.01 10/18/2022    CREATININE 1.12 01/18/2022   Stable repeat CMP at next visit in 3 months

## 2024-02-21 PROBLEM — Z00.00 MEDICARE ANNUAL WELLNESS VISIT, SUBSEQUENT: Status: RESOLVED | Noted: 2018-12-12 | Resolved: 2024-02-21

## 2024-03-14 ENCOUNTER — OFFICE VISIT (OUTPATIENT)
Dept: FAMILY MEDICINE CLINIC | Facility: CLINIC | Age: 89
End: 2024-03-14
Payer: MEDICARE

## 2024-03-14 VITALS
DIASTOLIC BLOOD PRESSURE: 68 MMHG | WEIGHT: 166 LBS | HEART RATE: 63 BPM | SYSTOLIC BLOOD PRESSURE: 120 MMHG | OXYGEN SATURATION: 97 % | HEIGHT: 63 IN | TEMPERATURE: 97.8 F | BODY MASS INDEX: 29.41 KG/M2 | RESPIRATION RATE: 18 BRPM

## 2024-03-14 DIAGNOSIS — M54.50 ACUTE BILATERAL LOW BACK PAIN WITHOUT SCIATICA: ICD-10-CM

## 2024-03-14 DIAGNOSIS — I10 ESSENTIAL HYPERTENSION: ICD-10-CM

## 2024-03-14 DIAGNOSIS — M25.511 ACUTE PAIN OF RIGHT SHOULDER: Primary | ICD-10-CM

## 2024-03-14 DIAGNOSIS — M15.9 OSTEOARTHRITIS, GENERALIZED: ICD-10-CM

## 2024-03-14 DIAGNOSIS — M79.604 RIGHT LEG PAIN: ICD-10-CM

## 2024-03-14 PROCEDURE — G2211 COMPLEX E/M VISIT ADD ON: HCPCS | Performed by: FAMILY MEDICINE

## 2024-03-14 PROCEDURE — 99214 OFFICE O/P EST MOD 30 MIN: CPT | Performed by: FAMILY MEDICINE

## 2024-03-14 NOTE — ASSESSMENT & PLAN NOTE
Patient fell in his garage onto his right side injuring his right shoulder and he additionally has pain in the right leg and hip.  He is ambulatory and able to drive his car and walk and move but came in for evaluation.  His examination shows contusion to the shoulder most likely with range of motion limited by pain but nothing appears to be torn or broken clinically.  I recommend that he allow this area to rest over the next week but continues with normal activities limiting his need to lift anything heavier than 10 pounds.  If pain persist greater than the next 2 weeks he will go to physical therapy or we can follow-up with imaging studies

## 2024-03-14 NOTE — ASSESSMENT & PLAN NOTE
Generalized osteoarthritis and due to his advanced age I recommend daily stretching and exercise as he has been doing for years and this has been helping him to maintain his independence.  If symptoms flareup and do not improve I suggest physical therapy

## 2024-03-14 NOTE — PROGRESS NOTES
Assessment/Plan:       Problem List Items Addressed This Visit          Cardiovascular and Mediastinum    Essential hypertension     Hypertension stable continue same medication regimen no change at this time            Musculoskeletal and Integument    Osteoarthritis, generalized     Generalized osteoarthritis and due to his advanced age I recommend daily stretching and exercise as he has been doing for years and this has been helping him to maintain his independence.  If symptoms flareup and do not improve I suggest physical therapy            Surgery/Wound/Pain    Acute bilateral low back pain without sciatica     Longstanding chronic back pain with arthritis and he currently had a fall which created worsening stiffness in his lower back without signs of nerve or sciatic involvement.  He will continue to stretch and do exercises at home as he does every day and if not improving after 1 week from now I will ask him to see the physical therapist         Right shoulder pain - Primary     Patient fell in his garage onto his right side injuring his right shoulder and he additionally has pain in the right leg and hip.  He is ambulatory and able to drive his car and walk and move but came in for evaluation.  His examination shows contusion to the shoulder most likely with range of motion limited by pain but nothing appears to be torn or broken clinically.  I recommend that he allow this area to rest over the next week but continues with normal activities limiting his need to lift anything heavier than 10 pounds.  If pain persist greater than the next 2 weeks he will go to physical therapy or we can follow-up with imaging studies         Right leg pain     Right leg pain after a fall injury he is ambulatory and does not show any significant ecchymosis or contusions to the area he fell in his garage and is in pain and wanted further evaluation today no x-rays needed at this point and he will watch this area for any  "changes over the next 2 weeks as it should continue to recover.  I reassured him now as he lives alone              Subjective:      Patient ID: Kevin Rodriguez is a 89 y.o. male.    Presents for a fall injury in his garage yesterday.  He injured his right shoulder right hip and back area and his right knee.  He notes that he felt slightly dizzy when this occurred.  He was doing a lot of activity yesterday and made about 4 different trips out of the home making different stops.  I discussed with the patient and his family regarding keeping up with fluids and snacks or food and to limit activity each day    Fall  Pertinent negatives include no abdominal pain, fever, hematuria or vomiting.       The following portions of the patient's history were reviewed and updated as appropriate: allergies, current medications, past family history, past medical history, past social history, past surgical history and problem list.    Review of Systems   Constitutional:  Negative for chills and fever.   HENT:  Negative for ear pain and sore throat.    Eyes:  Negative for pain and visual disturbance.   Respiratory:  Negative for cough and shortness of breath.    Cardiovascular:  Negative for chest pain and palpitations.   Gastrointestinal:  Negative for abdominal pain and vomiting.   Genitourinary:  Negative for dysuria and hematuria.   Musculoskeletal:  Negative for arthralgias and back pain.   Skin:  Negative for color change and rash.   Neurological:  Negative for seizures and syncope.   All other systems reviewed and are negative.        Objective:      /68 (BP Location: Left arm, Patient Position: Sitting, Cuff Size: Standard)   Pulse 63   Temp 97.8 °F (36.6 °C) (Temporal)   Resp 18   Ht 5' 3\" (1.6 m)   Wt 75.3 kg (166 lb)   SpO2 97%   BMI 29.41 kg/m²        Physical Exam  Vitals and nursing note reviewed.   Constitutional:       Appearance: Normal appearance. He is well-developed.   HENT:      Head: Normocephalic " and atraumatic.      Right Ear: External ear normal.      Left Ear: External ear normal.      Nose: Nose normal.      Mouth/Throat:      Mouth: Mucous membranes are moist.   Eyes:      General: No scleral icterus.     Conjunctiva/sclera: Conjunctivae normal.      Pupils: Pupils are equal, round, and reactive to light.   Neck:      Thyroid: No thyromegaly.      Vascular: No JVD.   Cardiovascular:      Rate and Rhythm: Normal rate and regular rhythm.      Heart sounds: Normal heart sounds. No murmur heard.  Pulmonary:      Effort: Pulmonary effort is normal.      Breath sounds: Normal breath sounds. No wheezing or rales.   Chest:      Chest wall: No tenderness.   Abdominal:      General: Bowel sounds are normal. There is no distension.      Palpations: Abdomen is soft. There is no mass.      Tenderness: There is no abdominal tenderness. There is no guarding or rebound.   Musculoskeletal:         General: No tenderness or deformity. Normal range of motion.      Cervical back: Normal range of motion and neck supple.      Comments: Good range of motion right lower extremity on internal/external rotation at the hip joint knee has normal flexion extension for his age and based on his baseline with significant arthritis but no effusion or other damage notable.  Right shoulder shows good range of motion for his baseline with significant arthritis but nothing new from previous exam.   Lymphadenopathy:      Cervical: No cervical adenopathy.   Skin:     General: Skin is warm and dry.      Findings: No erythema or rash.   Neurological:      Mental Status: He is alert and oriented to person, place, and time.      Cranial Nerves: No cranial nerve deficit.      Deep Tendon Reflexes: Reflexes are normal and symmetric. Reflexes normal.   Psychiatric:         Behavior: Behavior normal.         Thought Content: Thought content normal.         Judgment: Judgment normal.          Data:    Laboratory Results: I have personally reviewed  "the pertinent laboratory results/reports   Radiology/Other Diagnostic Testing Results: I have personally reviewed pertinent reports.       Lab Results   Component Value Date    WBC 7.20 08/31/2023    HGB 10.0 (L) 08/31/2023    HCT 34.6 (L) 08/31/2023    MCV 72 (L) 08/31/2023     08/31/2023     Lab Results   Component Value Date    K 4.5 07/25/2023     07/25/2023    CO2 28 07/25/2023    BUN 21 07/25/2023    CREATININE 1.11 07/25/2023    GLUF 90 07/25/2023    CALCIUM 8.9 07/25/2023    CORRECTEDCA 9.4 07/25/2023    AST 12 07/25/2023    ALT 14 07/25/2023    ALKPHOS 70 07/25/2023    EGFR 55 07/25/2023     Lab Results   Component Value Date    CHOLESTEROL 136 07/25/2023    CHOLESTEROL 149 10/18/2022    CHOLESTEROL 174 07/14/2021     Lab Results   Component Value Date    HDL 48 07/25/2023    HDL 45 10/18/2022    HDL 57 07/14/2021     Lab Results   Component Value Date    LDLCALC 71 07/25/2023    LDLCALC 89 10/18/2022    LDLCALC 97 07/14/2021     Lab Results   Component Value Date    TRIG 83 07/25/2023    TRIG 76 10/18/2022    TRIG 99 07/14/2021     No results found for: \"CHOLHDL\"  Lab Results   Component Value Date    [id number] 0.798 07/25/2023     No results found for: \"HGBA1C\"  Lab Results   Component Value Date    PSA 5.4 (H) 07/14/2021       Parth Torres, DO      "

## 2024-03-14 NOTE — ASSESSMENT & PLAN NOTE
Right leg pain after a fall injury he is ambulatory and does not show any significant ecchymosis or contusions to the area he fell in his garage and is in pain and wanted further evaluation today no x-rays needed at this point and he will watch this area for any changes over the next 2 weeks as it should continue to recover.  I reassured him now as he lives alone

## 2024-03-14 NOTE — ASSESSMENT & PLAN NOTE
Longstanding chronic back pain with arthritis and he currently had a fall which created worsening stiffness in his lower back without signs of nerve or sciatic involvement.  He will continue to stretch and do exercises at home as he does every day and if not improving after 1 week from now I will ask him to see the physical therapist

## 2024-03-25 PROBLEM — I12.9 HYPERTENSIVE KIDNEY DISEASE WITH CKD (CHRONIC KIDNEY DISEASE): Status: ACTIVE | Noted: 2024-03-25

## 2024-04-12 ENCOUNTER — TELEPHONE (OUTPATIENT)
Dept: FAMILY MEDICINE CLINIC | Facility: CLINIC | Age: 89
End: 2024-04-12

## 2024-05-02 ENCOUNTER — RA CDI HCC (OUTPATIENT)
Dept: OTHER | Facility: HOSPITAL | Age: 89
End: 2024-05-02

## 2024-05-10 ENCOUNTER — TELEPHONE (OUTPATIENT)
Age: 89
End: 2024-05-10

## 2024-05-10 NOTE — TELEPHONE ENCOUNTER
Patients daughter called in looking for suggestions for any home health services that could come to the house and check on her dad. Daughter states  they have decided to no longer bring her dad out the house and to appointments. Please advise and call daughter back.

## 2024-05-13 DIAGNOSIS — M15.9 OSTEOARTHRITIS, GENERALIZED: ICD-10-CM

## 2024-05-13 DIAGNOSIS — M25.472 ANKLE EDEMA, BILATERAL: Primary | ICD-10-CM

## 2024-05-13 DIAGNOSIS — R53.81 PHYSICAL DECONDITIONING: ICD-10-CM

## 2024-05-13 DIAGNOSIS — M25.471 ANKLE EDEMA, BILATERAL: Primary | ICD-10-CM

## 2024-05-13 DIAGNOSIS — I10 ESSENTIAL HYPERTENSION: ICD-10-CM

## 2024-05-13 NOTE — TELEPHONE ENCOUNTER
Spoke to daughter, she said they need someone to come into the home to do vitals and ensure patient is ok. He is no longer leaving the home, patient is off all his medication and no longer doing blood work. Patients daughter said they are just riding this out. Daughter does have power of  and can supply all paperwork.

## 2024-05-14 ENCOUNTER — HOME CARE VISIT (OUTPATIENT)
Dept: HOME HEALTH SERVICES | Facility: HOME HEALTHCARE | Age: 89
End: 2024-05-14

## 2024-05-14 ENCOUNTER — TELEPHONE (OUTPATIENT)
Age: 89
End: 2024-05-14

## 2024-05-14 NOTE — TELEPHONE ENCOUNTER
Spoke with Lina. Advised her patient has not had this panel done since July of 2023. There is pending lab work in the system from February that patient is able to get done and this would have an updated CMP for her as well as a few other things. Lina does have a call out to the daughter, patients POA, to see if they can get the labs done. Lina does have access to Epic and is able to see the results when they result if patient does get them done in the future.   Lina aware she can reach out to the office with any further questions or concerns she may have for our office.

## 2024-05-14 NOTE — Clinical Note
Kevin Rodriguez 89yo male  1926 currently at home. Hx HTN and Osteoarhthritis. He was recently seen by PCP on 3/14/2024 after sustaining a fall at home. His weight on 2023 was 164 lbs; 170lbs on 2023 and 166lbs on 3/2024. Last albumin was on 2023 3.4. He has had no recent labs since then. I do not see a hospice diagnosis? I can have some lay eyes on him, would you like me to request a CMP be drawn on him?

## 2024-05-14 NOTE — TELEPHONE ENCOUNTER
[facility] ([facility]) calling on behalf of patient to please fax over complete metabolic panel results: [phone]. The intention is to go forward with hospice care or something additional to treatment plan.

## 2024-05-17 ENCOUNTER — TELEPHONE (OUTPATIENT)
Dept: FAMILY MEDICINE CLINIC | Facility: CLINIC | Age: 89
End: 2024-05-17

## 2024-05-17 NOTE — TELEPHONE ENCOUNTER
Hospice is asking for a BMP and Troponin to be added to lab work. Patient will be getting this done by mobile lab. Patient needs these done to see if he qualifies for hospice.

## 2024-05-20 ENCOUNTER — TELEPHONE (OUTPATIENT)
Dept: LAB | Facility: HOSPITAL | Age: 89
End: 2024-05-20

## 2024-05-20 ENCOUNTER — TELEPHONE (OUTPATIENT)
Age: 89
End: 2024-05-20

## 2024-05-20 NOTE — TELEPHONE ENCOUNTER
I spoke to patients daughter, she will be dropping  off POA paper and will be given a commutation consent form to update.   I was unable to disclose patients info to her until we get the proper documentation on file

## 2024-05-20 NOTE — TELEPHONE ENCOUNTER
Patients daughter called requesting patients social security number. Daughter states she needs this information to give to the [facility].     I informed Nancy that she is not on the medical consent and I am not able to provide any information.     Nancy is requesting to speak to   Dr. Torres. Nancy states Dr. Torres knows about the situation. Nancy states it is urgent, but not medically.     Please advise.   Nancy would like a return phone call.  Thank you

## 2024-05-23 DIAGNOSIS — I12.9 HYPERTENSIVE RENAL DISEASE, STAGE 1 THROUGH STAGE 4 OR UNSPECIFIED CHRONIC KIDNEY DISEASE: Primary | ICD-10-CM

## 2024-05-24 ENCOUNTER — APPOINTMENT (OUTPATIENT)
Dept: LAB | Facility: HOSPITAL | Age: 89
End: 2024-05-24
Payer: MEDICARE

## 2024-05-24 DIAGNOSIS — D50.9 IRON DEFICIENCY ANEMIA, UNSPECIFIED IRON DEFICIENCY ANEMIA TYPE: ICD-10-CM

## 2024-05-24 DIAGNOSIS — E78.2 MIXED HYPERLIPIDEMIA: ICD-10-CM

## 2024-05-24 DIAGNOSIS — N18.31 STAGE 3A CHRONIC KIDNEY DISEASE (HCC): ICD-10-CM

## 2024-05-24 DIAGNOSIS — I12.9 HYPERTENSIVE RENAL DISEASE, STAGE 1 THROUGH STAGE 4 OR UNSPECIFIED CHRONIC KIDNEY DISEASE: ICD-10-CM

## 2024-05-24 LAB
ALBUMIN SERPL BCP-MCNC: 3.7 G/DL (ref 3.5–5)
ALP SERPL-CCNC: 63 U/L (ref 34–104)
ALT SERPL W P-5'-P-CCNC: 9 U/L (ref 7–52)
ANION GAP SERPL CALCULATED.3IONS-SCNC: 6 MMOL/L (ref 4–13)
AST SERPL W P-5'-P-CCNC: 14 U/L (ref 13–39)
BASOPHILS # BLD AUTO: 0.03 THOUSANDS/ÂΜL (ref 0–0.1)
BASOPHILS NFR BLD AUTO: 1 % (ref 0–1)
BILIRUB SERPL-MCNC: 0.95 MG/DL (ref 0.2–1)
BUN SERPL-MCNC: 14 MG/DL (ref 5–25)
CALCIUM SERPL-MCNC: 8.9 MG/DL (ref 8.4–10.2)
CARDIAC TROPONIN I PNL SERPL HS: 11 NG/L (ref 8–18)
CHLORIDE SERPL-SCNC: 102 MMOL/L (ref 96–108)
CHOLEST SERPL-MCNC: 206 MG/DL
CO2 SERPL-SCNC: 32 MMOL/L (ref 21–32)
CREAT SERPL-MCNC: 0.89 MG/DL (ref 0.6–1.3)
EOSINOPHIL # BLD AUTO: 0.15 THOUSAND/ÂΜL (ref 0–0.61)
EOSINOPHIL NFR BLD AUTO: 2 % (ref 0–6)
ERYTHROCYTE [DISTWIDTH] IN BLOOD BY AUTOMATED COUNT: 13.9 % (ref 11.6–15.1)
FERRITIN SERPL-MCNC: 53 NG/ML (ref 24–336)
GFR SERPL CREATININE-BSD FRML MDRD: 71 ML/MIN/1.73SQ M
GLUCOSE P FAST SERPL-MCNC: 85 MG/DL (ref 65–99)
HCT VFR BLD AUTO: 48.7 % (ref 36.5–49.3)
HDLC SERPL-MCNC: 50 MG/DL
HGB BLD-MCNC: 15.2 G/DL (ref 12–17)
IMM GRANULOCYTES # BLD AUTO: 0.01 THOUSAND/UL (ref 0–0.2)
IMM GRANULOCYTES NFR BLD AUTO: 0 % (ref 0–2)
IRON SATN MFR SERPL: 54 % (ref 15–50)
IRON SERPL-MCNC: 138 UG/DL (ref 50–212)
LDLC SERPL CALC-MCNC: 128 MG/DL (ref 0–100)
LYMPHOCYTES # BLD AUTO: 1.34 THOUSANDS/ÂΜL (ref 0.6–4.47)
LYMPHOCYTES NFR BLD AUTO: 22 % (ref 14–44)
MCH RBC QN AUTO: 31.1 PG (ref 26.8–34.3)
MCHC RBC AUTO-ENTMCNC: 31.2 G/DL (ref 31.4–37.4)
MCV RBC AUTO: 100 FL (ref 82–98)
MONOCYTES # BLD AUTO: 0.6 THOUSAND/ÂΜL (ref 0.17–1.22)
MONOCYTES NFR BLD AUTO: 10 % (ref 4–12)
NEUTROPHILS # BLD AUTO: 4.07 THOUSANDS/ÂΜL (ref 1.85–7.62)
NEUTS SEG NFR BLD AUTO: 65 % (ref 43–75)
NONHDLC SERPL-MCNC: 156 MG/DL
NRBC BLD AUTO-RTO: 0 /100 WBCS
PLATELET # BLD AUTO: 136 THOUSANDS/UL (ref 149–390)
PMV BLD AUTO: 12.2 FL (ref 8.9–12.7)
POTASSIUM SERPL-SCNC: 4.6 MMOL/L (ref 3.5–5.3)
PROT SERPL-MCNC: 6.3 G/DL (ref 6.4–8.4)
RBC # BLD AUTO: 4.88 MILLION/UL (ref 3.88–5.62)
SODIUM SERPL-SCNC: 140 MMOL/L (ref 135–147)
TIBC SERPL-MCNC: 255 UG/DL (ref 250–450)
TRIGL SERPL-MCNC: 141 MG/DL
TSH SERPL DL<=0.05 MIU/L-ACNC: 0.77 UIU/ML (ref 0.45–4.5)
UIBC SERPL-MCNC: 117 UG/DL (ref 155–355)
WBC # BLD AUTO: 6.2 THOUSAND/UL (ref 4.31–10.16)

## 2024-05-24 PROCEDURE — 80061 LIPID PANEL: CPT

## 2024-05-24 PROCEDURE — 82728 ASSAY OF FERRITIN: CPT

## 2024-05-24 PROCEDURE — 85025 COMPLETE CBC W/AUTO DIFF WBC: CPT

## 2024-05-24 PROCEDURE — 80053 COMPREHEN METABOLIC PANEL: CPT

## 2024-05-24 PROCEDURE — 84484 ASSAY OF TROPONIN QUANT: CPT

## 2024-05-24 PROCEDURE — 84443 ASSAY THYROID STIM HORMONE: CPT

## 2024-05-24 PROCEDURE — 36415 COLL VENOUS BLD VENIPUNCTURE: CPT

## 2024-05-24 PROCEDURE — 83540 ASSAY OF IRON: CPT

## 2024-05-24 PROCEDURE — 83550 IRON BINDING TEST: CPT

## 2024-05-28 ENCOUNTER — TELEPHONE (OUTPATIENT)
Age: 89
End: 2024-05-28

## 2024-05-28 NOTE — TELEPHONE ENCOUNTER
I tried to call patients daughter to clarify if anybody from the office discuss patient lab results but got NALM

## 2024-05-28 NOTE — TELEPHONE ENCOUNTER
Patient's daughter Denys called back for lab results.  Warm transfer to [facility].

## 2024-07-02 ENCOUNTER — TELEPHONE (OUTPATIENT)
Age: 89
End: 2024-07-02

## 2024-07-02 NOTE — TELEPHONE ENCOUNTER
Carmen from [place] [employer]  called stating a physician certification form was faxed to office 6/28.    Carmen stated she will re fax form to office.     Please advise.

## 2024-10-21 DIAGNOSIS — I10 ESSENTIAL HYPERTENSION: ICD-10-CM

## 2024-10-21 DIAGNOSIS — E78.2 MIXED HYPERLIPIDEMIA: ICD-10-CM

## 2024-10-21 DIAGNOSIS — K21.9 GASTROESOPHAGEAL REFLUX DISEASE WITHOUT ESOPHAGITIS: ICD-10-CM

## 2024-10-23 RX ORDER — ROSUVASTATIN CALCIUM 5 MG/1
5 TABLET, COATED ORAL DAILY
Qty: 90 TABLET | Refills: 1 | Status: SHIPPED | OUTPATIENT
Start: 2024-10-23

## 2024-10-23 RX ORDER — LISINOPRIL 5 MG/1
5 TABLET ORAL DAILY
Qty: 90 TABLET | Refills: 1 | Status: SHIPPED | OUTPATIENT
Start: 2024-10-23

## 2025-01-01 ENCOUNTER — HOSPITAL ENCOUNTER (INPATIENT)
Facility: HOSPITAL | Age: OVER 89
LOS: 1 days | DRG: 871 | End: 2025-01-29
Attending: EMERGENCY MEDICINE | Admitting: FAMILY MEDICINE
Payer: MEDICARE

## 2025-01-01 ENCOUNTER — APPOINTMENT (INPATIENT)
Dept: RADIOLOGY | Facility: HOSPITAL | Age: OVER 89
DRG: 871 | End: 2025-01-01
Payer: MEDICARE

## 2025-01-01 ENCOUNTER — APPOINTMENT (EMERGENCY)
Dept: CT IMAGING | Facility: HOSPITAL | Age: OVER 89
DRG: 871 | End: 2025-01-01
Payer: MEDICARE

## 2025-01-01 VITALS
TEMPERATURE: 97.5 F | WEIGHT: 146.61 LBS | OXYGEN SATURATION: 93 % | HEIGHT: 63 IN | HEART RATE: 95 BPM | BODY MASS INDEX: 25.98 KG/M2 | SYSTOLIC BLOOD PRESSURE: 131 MMHG | DIASTOLIC BLOOD PRESSURE: 66 MMHG | RESPIRATION RATE: 19 BRPM

## 2025-01-01 DIAGNOSIS — G93.41 ACUTE METABOLIC ENCEPHALOPATHY: ICD-10-CM

## 2025-01-01 DIAGNOSIS — N17.9 AKI (ACUTE KIDNEY INJURY) (HCC): ICD-10-CM

## 2025-01-01 DIAGNOSIS — J18.9 PNEUMONIA: ICD-10-CM

## 2025-01-01 DIAGNOSIS — Z78.9 DNI (DO NOT INTUBATE): ICD-10-CM

## 2025-01-01 DIAGNOSIS — T68.XXXA HYPOTHERMIA, INITIAL ENCOUNTER: ICD-10-CM

## 2025-01-01 DIAGNOSIS — Z66 DNR (DO NOT RESUSCITATE): ICD-10-CM
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
## 2025-01-01 DIAGNOSIS — U07.1 COVID: ICD-10-CM

## 2025-01-01 DIAGNOSIS — R79.89 ELEVATED TROPONIN: ICD-10-CM

## 2025-01-01 LAB
2HR DELTA HS TROPONIN: -99 NG/L
4HR DELTA HS TROPONIN: 63 NG/L
ALBUMIN SERPL BCG-MCNC: 3.2 G/DL (ref 3.5–5)
ALBUMIN SERPL BCG-MCNC: 4.2 G/DL (ref 3.5–5)
ALP SERPL-CCNC: 48 U/L (ref 34–104)
ALP SERPL-CCNC: 77 U/L (ref 34–104)
ALT SERPL W P-5'-P-CCNC: 35 U/L (ref 7–52)
ALT SERPL W P-5'-P-CCNC: 43 U/L (ref 7–52)
ANION GAP SERPL CALCULATED.3IONS-SCNC: 13 MMOL/L (ref 4–13)
ANION GAP SERPL CALCULATED.3IONS-SCNC: 19 MMOL/L (ref 4–13)
ANION GAP SERPL CALCULATED.3IONS-SCNC: 21 MMOL/L (ref 4–13)
AST SERPL W P-5'-P-CCNC: 121 U/L (ref 13–39)
AST SERPL W P-5'-P-CCNC: 74 U/L (ref 13–39)
BACTERIA UR QL AUTO: ABNORMAL /HPF
BASOPHILS # BLD AUTO: 0.03 THOUSANDS/ΜL (ref 0–0.1)
BASOPHILS NFR BLD AUTO: 0 % (ref 0–1)
BILIRUB DIRECT SERPL-MCNC: 0.23 MG/DL (ref 0–0.2)
BILIRUB SERPL-MCNC: 1.05 MG/DL (ref 0.2–1)
BILIRUB SERPL-MCNC: 1.81 MG/DL (ref 0.2–1)
BILIRUB UR QL STRIP: NEGATIVE
BUN SERPL-MCNC: 55 MG/DL (ref 5–25)
BUN SERPL-MCNC: 64 MG/DL (ref 5–25)
BUN SERPL-MCNC: 66 MG/DL (ref 5–25)
CALCIUM ALBUM COR SERPL-MCNC: 7.6 MG/DL (ref 8.3–10.1)
CALCIUM SERPL-MCNC: 7 MG/DL (ref 8.4–10.2)
CALCIUM SERPL-MCNC: 9.7 MG/DL (ref 8.4–10.2)
CALCIUM SERPL-MCNC: 9.9 MG/DL (ref 8.4–10.2)
CARDIAC TROPONIN I PNL SERPL HS: 236 NG/L (ref ?–50)
CARDIAC TROPONIN I PNL SERPL HS: 335 NG/L (ref ?–50)
CARDIAC TROPONIN I PNL SERPL HS: 398 NG/L (ref ?–50)
CHLORIDE SERPL-SCNC: 111 MMOL/L (ref 96–108)
CHLORIDE SERPL-SCNC: 98 MMOL/L (ref 96–108)
CHLORIDE SERPL-SCNC: 99 MMOL/L (ref 96–108)
CLARITY UR: ABNORMAL
CO2 SERPL-SCNC: 17 MMOL/L (ref 21–32)
CO2 SERPL-SCNC: 19 MMOL/L (ref 21–32)
CO2 SERPL-SCNC: 19 MMOL/L (ref 21–32)
COLOR UR: ABNORMAL
CREAT SERPL-MCNC: 1.39 MG/DL (ref 0.6–1.3)
CREAT SERPL-MCNC: 1.44 MG/DL (ref 0.6–1.3)
CREAT SERPL-MCNC: 1.71 MG/DL (ref 0.6–1.3)
CRP SERPL QL: 216.2 MG/L
D DIMER PPP FEU-MCNC: 3.58 UG/ML FEU
EOSINOPHIL # BLD AUTO: 0 THOUSAND/ΜL (ref 0–0.61)
EOSINOPHIL NFR BLD AUTO: 0 % (ref 0–6)
ERYTHROCYTE [DISTWIDTH] IN BLOOD BY AUTOMATED COUNT: 13.6 % (ref 11.6–15.1)
FLUAV AG UPPER RESP QL IA.RAPID: NEGATIVE
FLUBV AG UPPER RESP QL IA.RAPID: NEGATIVE
GFR SERPL CREATININE-BSD FRML MDRD: 32 ML/MIN/1.73SQ M
GFR SERPL CREATININE-BSD FRML MDRD: 40 ML/MIN/1.73SQ M
GFR SERPL CREATININE-BSD FRML MDRD: 41 ML/MIN/1.73SQ M
GLUCOSE SERPL-MCNC: 107 MG/DL (ref 65–140)
GLUCOSE SERPL-MCNC: 67 MG/DL (ref 65–140)
GLUCOSE SERPL-MCNC: 86 MG/DL (ref 65–140)
GLUCOSE UR STRIP-MCNC: NEGATIVE MG/DL
HCT VFR BLD AUTO: 55 % (ref 36.5–49.3)
HGB BLD-MCNC: 18 G/DL (ref 12–17)
HGB UR QL STRIP.AUTO: ABNORMAL
IMM GRANULOCYTES # BLD AUTO: 0.09 THOUSAND/UL (ref 0–0.2)
IMM GRANULOCYTES NFR BLD AUTO: 1 % (ref 0–2)
KETONES UR STRIP-MCNC: ABNORMAL MG/DL
LACTATE SERPL-SCNC: 2.2 MMOL/L (ref 0.5–2)
LACTATE SERPL-SCNC: 2.2 MMOL/L (ref 0.5–2)
LEUKOCYTE ESTERASE UR QL STRIP: ABNORMAL
LYMPHOCYTES # BLD AUTO: 0.41 THOUSANDS/ΜL (ref 0.6–4.47)
LYMPHOCYTES NFR BLD AUTO: 2 % (ref 14–44)
MAGNESIUM SERPL-MCNC: 2.2 MG/DL (ref 1.9–2.7)
MCH RBC QN AUTO: 29.7 PG (ref 26.8–34.3)
MCHC RBC AUTO-ENTMCNC: 32.7 G/DL (ref 31.4–37.4)
MCV RBC AUTO: 91 FL (ref 82–98)
MONOCYTES # BLD AUTO: 1.19 THOUSAND/ΜL (ref 0.17–1.22)
MONOCYTES NFR BLD AUTO: 6 % (ref 4–12)
NEUTROPHILS # BLD AUTO: 17.97 THOUSANDS/ΜL (ref 1.85–7.62)
NEUTS SEG NFR BLD AUTO: 91 % (ref 43–75)
NITRITE UR QL STRIP: NEGATIVE
NON-SQ EPI CELLS URNS QL MICRO: ABNORMAL /HPF
NRBC BLD AUTO-RTO: 0 /100 WBCS
PH UR STRIP.AUTO: 5 [PH]
PLATELET # BLD AUTO: 202 THOUSANDS/UL (ref 149–390)
PLATELET # BLD AUTO: 218 THOUSANDS/UL (ref 149–390)
PMV BLD AUTO: 11.6 FL (ref 8.9–12.7)
PMV BLD AUTO: 11.9 FL (ref 8.9–12.7)
POTASSIUM SERPL-SCNC: 4.4 MMOL/L (ref 3.5–5.3)
POTASSIUM SERPL-SCNC: 4.6 MMOL/L (ref 3.5–5.3)
POTASSIUM SERPL-SCNC: 5.2 MMOL/L (ref 3.5–5.3)
PROCALCITONIN SERPL-MCNC: 1.65 NG/ML
PROT SERPL-MCNC: 5.7 G/DL (ref 6.4–8.4)
PROT SERPL-MCNC: 7.7 G/DL (ref 6.4–8.4)
PROT UR STRIP-MCNC: ABNORMAL MG/DL
RBC # BLD AUTO: 6.07 MILLION/UL (ref 3.88–5.62)
RBC #/AREA URNS AUTO: ABNORMAL /HPF
SARS-COV+SARS-COV-2 AG RESP QL IA.RAPID: POSITIVE
SODIUM SERPL-SCNC: 136 MMOL/L (ref 135–147)
SODIUM SERPL-SCNC: 139 MMOL/L (ref 135–147)
SODIUM SERPL-SCNC: 141 MMOL/L (ref 135–147)
SP GR UR STRIP.AUTO: 1.02 (ref 1–1.03)
UROBILINOGEN UR STRIP-ACNC: <2 MG/DL
WBC # BLD AUTO: 19.69 THOUSAND/UL (ref 4.31–10.16)
WBC #/AREA URNS AUTO: ABNORMAL /HPF

## 2025-01-01 PROCEDURE — 71045 X-RAY EXAM CHEST 1 VIEW: CPT

## 2025-01-01 PROCEDURE — 87811 SARS-COV-2 COVID19 W/OPTIC: CPT | Performed by: EMERGENCY MEDICINE

## 2025-01-01 PROCEDURE — 80053 COMPREHEN METABOLIC PANEL: CPT | Performed by: FAMILY MEDICINE

## 2025-01-01 PROCEDURE — 81001 URINALYSIS AUTO W/SCOPE: CPT | Performed by: NURSE PRACTITIONER

## 2025-01-01 PROCEDURE — 99223 1ST HOSP IP/OBS HIGH 75: CPT | Performed by: NURSE PRACTITIONER

## 2025-01-01 PROCEDURE — 87086 URINE CULTURE/COLONY COUNT: CPT | Performed by: NURSE PRACTITIONER

## 2025-01-01 PROCEDURE — 85379 FIBRIN DEGRADATION QUANT: CPT | Performed by: FAMILY MEDICINE

## 2025-01-01 PROCEDURE — 87804 INFLUENZA ASSAY W/OPTIC: CPT | Performed by: EMERGENCY MEDICINE

## 2025-01-01 PROCEDURE — 87040 BLOOD CULTURE FOR BACTERIA: CPT | Performed by: EMERGENCY MEDICINE

## 2025-01-01 PROCEDURE — 83735 ASSAY OF MAGNESIUM: CPT | Performed by: FAMILY MEDICINE

## 2025-01-01 PROCEDURE — 80076 HEPATIC FUNCTION PANEL: CPT | Performed by: EMERGENCY MEDICINE

## 2025-01-01 PROCEDURE — 93005 ELECTROCARDIOGRAM TRACING: CPT

## 2025-01-01 PROCEDURE — 80048 BASIC METABOLIC PNL TOTAL CA: CPT | Performed by: FAMILY MEDICINE

## 2025-01-01 PROCEDURE — 84484 ASSAY OF TROPONIN QUANT: CPT | Performed by: FAMILY MEDICINE

## 2025-01-01 PROCEDURE — NC001 PR NO CHARGE: Performed by: NURSE PRACTITIONER

## 2025-01-01 PROCEDURE — 70450 CT HEAD/BRAIN W/O DYE: CPT

## 2025-01-01 PROCEDURE — XW033E5 INTRODUCTION OF REMDESIVIR ANTI-INFECTIVE INTO PERIPHERAL VEIN, PERCUTANEOUS APPROACH, NEW TECHNOLOGY GROUP 5: ICD-10-PCS | Performed by: FAMILY MEDICINE

## 2025-01-01 PROCEDURE — 99223 1ST HOSP IP/OBS HIGH 75: CPT | Performed by: INTERNAL MEDICINE

## 2025-01-01 PROCEDURE — 99239 HOSP IP/OBS DSCHRG MGMT >30: CPT | Performed by: PHYSICIAN ASSISTANT

## 2025-01-01 PROCEDURE — 86140 C-REACTIVE PROTEIN: CPT | Performed by: FAMILY MEDICINE

## 2025-01-01 PROCEDURE — 84145 PROCALCITONIN (PCT): CPT | Performed by: FAMILY MEDICINE

## 2025-01-01 PROCEDURE — 80048 BASIC METABOLIC PNL TOTAL CA: CPT | Performed by: EMERGENCY MEDICINE

## 2025-01-01 PROCEDURE — 99223 1ST HOSP IP/OBS HIGH 75: CPT | Performed by: FAMILY MEDICINE

## 2025-01-01 PROCEDURE — 83605 ASSAY OF LACTIC ACID: CPT | Performed by: EMERGENCY MEDICINE

## 2025-01-01 PROCEDURE — 36415 COLL VENOUS BLD VENIPUNCTURE: CPT | Performed by: EMERGENCY MEDICINE

## 2025-01-01 PROCEDURE — 84484 ASSAY OF TROPONIN QUANT: CPT | Performed by: EMERGENCY MEDICINE

## 2025-01-01 PROCEDURE — 99285 EMERGENCY DEPT VISIT HI MDM: CPT | Performed by: EMERGENCY MEDICINE

## 2025-01-01 PROCEDURE — 99285 EMERGENCY DEPT VISIT HI MDM: CPT

## 2025-01-01 PROCEDURE — 92610 EVALUATE SWALLOWING FUNCTION: CPT

## 2025-01-01 PROCEDURE — 85025 COMPLETE CBC W/AUTO DIFF WBC: CPT | Performed by: EMERGENCY MEDICINE

## 2025-01-01 PROCEDURE — 85049 AUTOMATED PLATELET COUNT: CPT | Performed by: FAMILY MEDICINE

## 2025-01-01 RX ORDER — PRAVASTATIN SODIUM 40 MG
40 TABLET ORAL
Status: DISCONTINUED | OUTPATIENT
Start: 2025-01-01 | End: 2025-01-01

## 2025-01-01 RX ORDER — IPRATROPIUM BROMIDE AND ALBUTEROL SULFATE 2.5; .5 MG/3ML; MG/3ML
3 SOLUTION RESPIRATORY (INHALATION) ONCE
Status: DISCONTINUED | OUTPATIENT
Start: 2025-01-01 | End: 2025-01-01

## 2025-01-01 RX ORDER — HEPARIN SODIUM 5000 [USP'U]/ML
5000 INJECTION, SOLUTION INTRAVENOUS; SUBCUTANEOUS EVERY 8 HOURS SCHEDULED
Status: DISCONTINUED | OUTPATIENT
Start: 2025-01-01 | End: 2025-01-01

## 2025-01-01 RX ORDER — PANTOPRAZOLE SODIUM 20 MG/1
20 TABLET, DELAYED RELEASE ORAL
Status: DISCONTINUED | OUTPATIENT
Start: 2025-01-01 | End: 2025-01-01

## 2025-01-01 RX ORDER — HALOPERIDOL 5 MG/ML
0.5 INJECTION INTRAMUSCULAR EVERY 2 HOUR PRN
Status: DISCONTINUED | OUTPATIENT
Start: 2025-01-01 | End: 2025-01-30 | Stop reason: HOSPADM

## 2025-01-01 RX ORDER — ONDANSETRON 2 MG/ML
4 INJECTION INTRAMUSCULAR; INTRAVENOUS EVERY 6 HOURS PRN
Status: DISCONTINUED | OUTPATIENT
Start: 2025-01-01 | End: 2025-01-30 | Stop reason: HOSPADM

## 2025-01-01 RX ORDER — GLYCOPYRROLATE 0.2 MG/ML
0.1 INJECTION INTRAMUSCULAR; INTRAVENOUS EVERY 4 HOURS PRN
Status: DISCONTINUED | OUTPATIENT
Start: 2025-01-01 | End: 2025-01-30 | Stop reason: HOSPADM

## 2025-01-01 RX ORDER — PANTOPRAZOLE SODIUM 40 MG/10ML
40 INJECTION, POWDER, LYOPHILIZED, FOR SOLUTION INTRAVENOUS
Status: DISCONTINUED | OUTPATIENT
Start: 2025-01-30 | End: 2025-01-01

## 2025-01-01 RX ORDER — HYDROMORPHONE HCL/PF 1 MG/ML
0.3 SYRINGE (ML) INJECTION EVERY 2 HOUR PRN
Refills: 0 | Status: DISCONTINUED | OUTPATIENT
Start: 2025-01-01 | End: 2025-01-30 | Stop reason: HOSPADM

## 2025-01-01 RX ORDER — LISINOPRIL 5 MG/1
5 TABLET ORAL DAILY
Status: DISCONTINUED | OUTPATIENT
Start: 2025-01-01 | End: 2025-01-01

## 2025-01-01 RX ORDER — SODIUM CHLORIDE 9 MG/ML
75 INJECTION, SOLUTION INTRAVENOUS CONTINUOUS
Status: DISCONTINUED | OUTPATIENT
Start: 2025-01-01 | End: 2025-01-01

## 2025-01-01 RX ORDER — ACETAMINOPHEN 10 MG/ML
1000 INJECTION, SOLUTION INTRAVENOUS EVERY 6 HOURS PRN
Status: DISCONTINUED | OUTPATIENT
Start: 2025-01-01 | End: 2025-01-30 | Stop reason: HOSPADM

## 2025-01-01 RX ORDER — ACETAMINOPHEN 325 MG/1
650 TABLET ORAL EVERY 6 HOURS PRN
Status: DISCONTINUED | OUTPATIENT
Start: 2025-01-01 | End: 2025-01-30 | Stop reason: HOSPADM

## 2025-01-01 RX ADMIN — HYDROMORPHONE HYDROCHLORIDE 0.3 MG: 1 INJECTION, SOLUTION INTRAMUSCULAR; INTRAVENOUS; SUBCUTANEOUS at 17:29

## 2025-01-01 RX ADMIN — HYDROMORPHONE HYDROCHLORIDE 0.3 MG: 1 INJECTION, SOLUTION INTRAMUSCULAR; INTRAVENOUS; SUBCUTANEOUS at 15:28

## 2025-01-01 RX ADMIN — ACETAMINOPHEN 1000 MG: 10 INJECTION INTRAVENOUS at 19:37

## 2025-01-01 RX ADMIN — HEPARIN SODIUM 5000 UNITS: 5000 INJECTION INTRAVENOUS; SUBCUTANEOUS at 05:24

## 2025-01-01 RX ADMIN — CEFTRIAXONE SODIUM 1000 MG: 10 INJECTION, POWDER, FOR SOLUTION INTRAVENOUS at 13:04

## 2025-01-01 RX ADMIN — HEPARIN SODIUM 5000 UNITS: 5000 INJECTION INTRAVENOUS; SUBCUTANEOUS at 21:48

## 2025-01-01 RX ADMIN — REMDESIVIR 200 MG: 100 INJECTION, POWDER, LYOPHILIZED, FOR SOLUTION INTRAVENOUS at 21:44

## 2025-01-01 RX ADMIN — SODIUM CHLORIDE 75 ML/HR: 0.9 INJECTION, SOLUTION INTRAVENOUS at 21:41

## 2025-01-28 PROBLEM — N17.9 ARF (ACUTE RENAL FAILURE) (HCC): Status: ACTIVE | Noted: 2025-01-01

## 2025-01-28 PROBLEM — U07.1 COVID: Status: ACTIVE | Noted: 2025-01-01

## 2025-01-28 PROBLEM — T68.XXXA HYPOTHERMIA: Status: ACTIVE | Noted: 2025-01-01

## 2025-01-28 PROBLEM — G93.41 ACUTE METABOLIC ENCEPHALOPATHY: Status: ACTIVE | Noted: 2025-01-01

## 2025-01-28 PROBLEM — R79.89 ELEVATED TROPONIN: Status: ACTIVE | Noted: 2025-01-01

## 2025-01-28 NOTE — ASSESSMENT & PLAN NOTE
Gentle hydration and monitor.  Does have CKD stage III but likely dehydration from being on the floor

## 2025-01-28 NOTE — ASSESSMENT & PLAN NOTE
May be secondary to COVID.  He has underlying cognitive issues as well that might be worsening.  He was also hypothermic.  Try to reverse underlying causes and then if no improvement may need to consider neurology evaluation or may need to consider palliative care given his advanced age  Geriatric evaluation for now

## 2025-01-28 NOTE — H&P
H&P - Hospitalist   Name: Kevin Rodriguez 89 y.o. male I MRN: [id number]  Unit/Bed#: ED 23 I Date of Admission: 1/28/2025   Date of Service: 1/28/2025 I Hospital Day: 0     Assessment & Plan  Acute metabolic encephalopathy  May be secondary to COVID.  He has underlying cognitive issues as well that might be worsening.  He was also hypothermic.  Try to reverse underlying causes and then if no improvement may need to consider neurology evaluation or may need to consider palliative care given his advanced age  Geriatric evaluation for now  COVID  Will put the patient on remdesivir given risk factors I do not see an indication for steroids at this time.  COVID pathway.  Elevated troponin  Trend for now.  Doubt cardiac.  Could be secondary to acute renal failure.  If the patient's troponin continues to rise then will need a heparin drip and a cardiology consultation but hold off for now  Essential hypertension  Continue with the ACE inhibitor.  May need to hold if the creatinine does not improve.  Gastroesophageal reflux disease without esophagitis  PPI  Hypothermia  Gracia hodges  Check a CPK patient was on the floor possibly for a day if not longer  ARF (acute renal failure) (HCC)  Gentle hydration and monitor.  Does have CKD stage III but likely dehydration from being on the floor      VTE Pharmacologic Prophylaxis:   High Risk (Score >/= 5) - Pharmacological DVT Prophylaxis Ordered: heparin. Sequential Compression Devices Ordered.  Code Status: No Order DNR level 3  Discussion with family: Updated  (daughter) via phone.    Anticipated Length of Stay: Patient will be admitted on an inpatient basis with an anticipated length of stay of greater than 2 midnights secondary to being hypothermic COVID-positive encephalopathic.    History of Present Illness   Chief Complaint: Found on the floor    Kevin Rodriguez is a 89 y.o. male with a PMH of cognitive issues and hypertension who presents with the floor.   This is a 89-year-old male patient who was found on the floor by his daughter today.  When she went to the house to check up on him he was on the ground covered in urine.  He was also mumbling which is not his baseline.  She last saw him on  and states he was in his usual state of health.  He does have some cognitive decline but you could normally hold a conversation.  She does not know about any fevers or chills.  He is supposed to be ambulating with a walker but he does that intermittently.  Unfortunately no other history was able to be obtained at this time except the patient is not his baseline mental status..    Review of Systems   Unable to perform ROS: Mental status change       Historical Information   Past Medical History:   Diagnosis Date    Elevated cholesterol     GERD (gastroesophageal reflux disease)     Hypertension     Sleep apnea      Past Surgical History:   Procedure Laterality Date    APPENDECTOMY       Social History     Tobacco Use    Smoking status: Former     Current packs/day: 0.00     Average packs/day: 0.3 packs/day for 10.0 years (2.5 ttl pk-yrs)     Types: Cigarettes     Start date:      Quit date:      Years since quittin.1    Smokeless tobacco: Never   Vaping Use    Vaping status: Never Used   Substance and Sexual Activity    Alcohol use: Yes     Comment: glass of wine daily    Drug use: No    Sexual activity: Not on file     E-Cigarette/Vaping    E-Cigarette Use Never User      E-Cigarette/Vaping Substances    Nicotine No     THC No     CBD No     Flavoring No     Other No     Unknown No      Family History   Problem Relation Age of Onset    No Known Problems Mother     No Known Problems Father      Social History:  Marital Status: /Civil Union   Occupation: Retired  Patient Pre-hospital Living Situation: Home  Patient Pre-hospital Level of Mobility: walks with walker  Patient Pre-hospital Diet Restrictions: None    Meds/Allergies   I have reviewed home  medications with a medical source (PCP, Pharmacy, other).  Prior to Admission medications    Medication Sig Start Date End Date Taking? Authorizing Provider   lisinopril (ZESTRIL) 5 mg tablet TAKE 1 TABLET BY MOUTH DAILY 10/23/24   Parth Torres DO   meloxicam (MOBIC) 15 mg tablet Take 1 tablet (15 mg total) by mouth daily 7/13/20   Charlotte LUIS Emanuel, TIFFANIE   omeprazole (PriLOSEC) 20 mg delayed release capsule TAKE 1 CAPSULE BY MOUTH DAILY 10/23/24   Parth Torres DO   rosuvastatin (CRESTOR) 5 mg tablet TAKE 1 TABLET BY MOUTH DAILY 10/23/24   Parth Torres DO     No Known Allergies    Objective :  Temp:  [89.4 °F (31.9 °C)] 89.4 °F (31.9 °C)  HR:  [] 86  BP: (131-143)/(63-68) 131/64  Resp:  [15-24] 20  SpO2:  [94 %-95 %] 95 %  O2 Device: None (Room air)    Physical Exam   General Appearance:    Alert, not oriented   Head:    Normocephalic, without obvious abnormality, atraumatic   Eyes:    PERRL, conjunctiva/corneas clear, EOM's intact,             Nose:   Nares normal, septum midline, mucosa normal   Throat:   Lips, mucosa, and tongue normal; teeth and gums normal   Neck:   Supple, symmetrical, no adenopathy;        thyroid:  No enlargement/tenderness/nodules; no carotid    bruit or JVD   Back:     Symmetric, no curvature, ROM normal, no CVA tenderness   Lungs:     Clear to auscultation bilaterally, respirations unlabored       Heart:    Regular rate and rhythm, S1 and S2 normal, no murmur, rub    or gallop   Abdomen:     Soft, non-tender, bowel sounds active all four quadrants,     no masses, no organomegaly           Extremities:   Extremities normal, atraumatic, no cyanosis or edema   Pulses:   2+ and symmetric all extremities   Skin: Multiple r lesions lower extremities.  There is some scaling noted as well.   Lymph nodes:   Cervical, supraclavicular, and axillary nodes normal   Neurologic:   CNII-XII intact. Normal strength, sensation and reflexes       throughout  "        Lines/Drains:            Lab Results: I have reviewed the following results:  Results from last 7 days   Lab Units 01/28/25  1459   WBC Thousand/uL 19.69*   HEMOGLOBIN g/dL 18.0*   HEMATOCRIT % 55.0*   PLATELETS Thousands/uL 202   SEGS PCT % 91*   LYMPHO PCT % 2*   MONO PCT % 6   EOS PCT % 0     Results from last 7 days   Lab Units 01/28/25  1459   SODIUM mmol/L 136   POTASSIUM mmol/L 4.4   CHLORIDE mmol/L 98   CO2 mmol/L 19*   BUN mg/dL 55*   CREATININE mg/dL 1.39*   ANION GAP mmol/L 19*   CALCIUM mg/dL 9.9   ALBUMIN g/dL 4.2   TOTAL BILIRUBIN mg/dL 1.81*   ALK PHOS U/L 77   ALT U/L 43   AST U/L 121*   GLUCOSE RANDOM mg/dL 107             No results found for: \"HGBA1C\"  Results from last 7 days   Lab Units 01/28/25  1459   LACTIC ACID mmol/L 2.2*       Imaging Results Review: I personally reviewed the following image studies in PACS and associated radiology reports: CT head. My interpretation of the radiology images/reports is: Nothing acute on CT scan.  Other Study Results Review: No additional pertinent studies reviewed.    Administrative Statements   Time spent was 60 minutes.  This was gathering history formulating a plan going over the labs.  Also doing the assessment and plan and putting in orders.    ** Please Note: This note has been constructed using a voice recognition system. **    "

## 2025-01-28 NOTE — ASSESSMENT & PLAN NOTE
Trend for now.  Doubt cardiac.  Could be secondary to acute renal failure.  If the patient's troponin continues to rise then will need a heparin drip and a cardiology consultation but hold off for now

## 2025-01-28 NOTE — ED PROVIDER NOTES
Time reflects when diagnosis was documented in both MDM as applicable and the Disposition within this note       Time User Action Codes Description Comment    1/28/2025  3:20 PM Gregory, Jerel Add [W19.XXXA] Fall, initial encounter     1/28/2025  3:27 PM Gregory, Jerel Add [U07.1] COVID     1/28/2025  3:27 PM Gregory, Jerel Add [Z66] DNR (do not resuscitate)     1/28/2025  3:27 PM Gregory, Jerel Add [Z78.9] DNI (do not intubate)     1/28/2025  3:29 PM Gregory, Jerel Add [N17.9] RASHIDA (acute kidney injury) (HCC)     1/28/2025  4:33 PM Gregory, Jerel Add [T68.XXXA] Hypothermia, initial encounter     1/28/2025  4:34 PM Gregory, Jerel Add [R79.89] Elevated troponin     1/28/2025  5:19 PM Azar Centeno Add [G93.41] Acute metabolic encephalopathy           ED Disposition       ED Disposition   Admit    Condition   Stable    Date/Time   Tue Jan 28, 2025  5:55 PM    Comment   Case was discussed with Dr Centeno and the patient's admission status was agreed to be Admission Status: inpatient status to the service of Dr. Centeno .               Assessment & Plan       Medical Decision Making  Problems Addressed:  RASHIDA (acute kidney injury) (HCC): complicated acute illness or injury that poses a threat to life or bodily functions  COVID: complicated acute illness or injury with systemic symptoms that poses a threat to life or bodily functions  Elevated troponin: complicated acute illness or injury that poses a threat to life or bodily functions  Hypothermia, initial encounter: complicated acute illness or injury that poses a threat to life or bodily functions    Amount and/or Complexity of Data Reviewed  Labs: ordered.  Radiology: ordered.    Risk  Decision regarding hospitalization.             Medications - No data to display    ED Risk Strat Scores                          SBIRT 22yo+      Flowsheet Row Most Recent Value   Initial Alcohol Screen: US AUDIT-C     1. How often do you have a drink containing alcohol? 0 Filed at: 01/28/2025 1443   2.  "How many drinks containing alcohol do you have on a typical day you are drinking?  0 Filed at: 2025 1443   3a. Male UNDER 65: How often do you have five or more drinks on one occasion? 0 Filed at: 2025 1443   Audit-C Score 0 Filed at: 2025 1443   VICKY: How many times in the past year have you...    Used an illegal drug or used a prescription medication for non-medical reasons? Never Filed at: 2025 1443                            History of Present Illness       Chief Complaint   Patient presents with    Fall     Pt BIB EMS pt was found by family on floor yesterday covered in urine, pt mumbles words per family \"this is not normal for him\" last know well was .       Past Medical History:   Diagnosis Date    Elevated cholesterol     GERD (gastroesophageal reflux disease)     Hypertension     Sleep apnea       Past Surgical History:   Procedure Laterality Date    APPENDECTOMY        Family History   Problem Relation Age of Onset    No Known Problems Mother     No Known Problems Father       Social History     Tobacco Use    Smoking status: Former     Current packs/day: 0.00     Average packs/day: 0.3 packs/day for 10.0 years (2.5 ttl pk-yrs)     Types: Cigarettes     Start date:      Quit date: 1970     Years since quittin.1    Smokeless tobacco: Never   Vaping Use    Vaping status: Never Used   Substance Use Topics    Alcohol use: Yes     Comment: glass of wine daily    Drug use: No      E-Cigarette/Vaping    E-Cigarette Use Never User       E-Cigarette/Vaping Substances    Nicotine No     THC No     CBD No     Flavoring No     Other No     Unknown No       I have reviewed and agree with the history as documented.     89 yo male found on the floor by his daughter today. Last seen yesterday and was normal at that time. Pt lives independently. Daughter notes cognitive decline as of one year ago. Pt arrives to ED confused, hypothermic, daughter at bedside requests no aggressive " intervention. Complete history limited due to AMS.         Review of Systems   Unable to perform ROS: Mental status change           Objective       ED Triage Vitals [01/28/25 1444]   Temperature Pulse Blood Pressure Respirations SpO2 Patient Position - Orthostatic VS   (!) 89.4 °F (31.9 °C) (!) 110 143/68 (!) 24 95 % Lying      Temp Source Heart Rate Source BP Location FiO2 (%) Pain Score    Rectal Monitor Right arm -- --      Vitals      Date and Time Temp Pulse SpO2 Resp BP Pain Score FACES Pain Rating User   01/28/25 1700 -- 86 95 % 20 131/64 -- --    01/28/25 1645 -- 83 95 % 18 -- -- --    01/28/25 1630 -- 104 94 % 15 136/63 -- --    01/28/25 1444 89.4 °F (31.9 °C) 110 95 % 24 143/68 -- -- UMM            Physical Exam  Vitals and nursing note reviewed.   Constitutional:       General: He is not in acute distress.     Appearance: He is well-developed. He is not ill-appearing, toxic-appearing or diaphoretic.   HENT:      Head: Normocephalic and atraumatic.      Mouth/Throat:      Mouth: Mucous membranes are moist.      Pharynx: Oropharynx is clear.   Eyes:      Conjunctiva/sclera: Conjunctivae normal.      Pupils: Pupils are equal, round, and reactive to light.   Neck:      Vascular: No JVD.   Cardiovascular:      Rate and Rhythm: Normal rate and regular rhythm.      Pulses: Normal pulses.      Heart sounds: Normal heart sounds. No murmur heard.     No friction rub. No gallop.   Pulmonary:      Effort: Pulmonary effort is normal. No respiratory distress.      Breath sounds: Normal breath sounds. No stridor. No wheezing or rales.   Abdominal:      General: There is no distension.      Palpations: Abdomen is soft.      Tenderness: There is no abdominal tenderness. There is no guarding or rebound.   Musculoskeletal:         General: No swelling, tenderness, deformity or signs of injury. Normal range of motion.      Cervical back: Normal range of motion and neck supple. No rigidity.   Skin:     General: Skin is  warm and dry.      Capillary Refill: Capillary refill takes less than 2 seconds.      Coloration: Skin is not jaundiced or pale.      Findings: No bruising or erythema.   Neurological:      General: No focal deficit present.      Mental Status: He is disoriented.      Cranial Nerves: No cranial nerve deficit.      Sensory: No sensory deficit.      Motor: No weakness or abnormal muscle tone.      Coordination: Coordination normal.      Gait: Gait normal.         Results Reviewed       Procedure Component Value Units Date/Time    HS Troponin I 2hr [[id number]] Updated: 01/28/25 1749    Lab Status: No result Specimen: Blood from Arm, Right     Lactic acid 2 Hours [[id number]] Updated: 01/28/25 1749    Lab Status: No result Specimen: Blood from Arm, Right     HS Troponin I 4hr [[id number]]     Lab Status: No result Specimen: Blood     HS Troponin 0hr (reflex protocol) [[id number]]  (Abnormal) Collected: 01/28/25 1459    Lab Status: Final result Specimen: Blood from Arm, Right Updated: 01/28/25 1533     hs TnI 0hr 335 ng/L     Blood culture #2 [[id number]] Collected: 01/28/25 1527    Lab Status: In process Specimen: Blood from Arm, Left Updated: 01/28/25 1529    Basic metabolic panel [[id number]]  (Abnormal) Collected: 01/28/25 1459    Lab Status: Final result Specimen: Blood from Arm, Right Updated: 01/28/25 1528     Sodium 136 mmol/L      Potassium 4.4 mmol/L      Chloride 98 mmol/L      CO2 19 mmol/L      ANION GAP 19 mmol/L      BUN 55 mg/dL      Creatinine 1.39 mg/dL      Glucose 107 mg/dL      Calcium 9.9 mg/dL      eGFR 41 ml/min/1.73sq m     Narrative:      National Kidney Disease Foundation guidelines for Chronic Kidney Disease (CKD):     Stage 1 with normal or high GFR (GFR > 90 mL/min/1.73 square meters)    Stage 2 Mild CKD (GFR = 60-89 mL/min/1.73 square meters)    Stage 3A Moderate CKD (GFR = 45-59 mL/min/1.73 square meters)    Stage 3B Moderate CKD (GFR = 30-44 mL/min/1.73 square meters)    Stage 4 Severe CKD  (GFR = 15-29 mL/min/1.73 square meters)    Stage 5 End Stage CKD (GFR <15 mL/min/1.73 square meters)  Note: GFR calculation is accurate only with a steady state creatinine    Hepatic function panel [[id number]]  (Abnormal) Collected: 01/28/25 1459    Lab Status: Final result Specimen: Blood from Arm, Right Updated: 01/28/25 1528     Total Bilirubin 1.81 mg/dL      Bilirubin, Direct 0.23 mg/dL      Alkaline Phosphatase 77 U/L       U/L      ALT 43 U/L      Total Protein 7.7 g/dL      Albumin 4.2 g/dL     Lactic acid, plasma (w/reflex if result > 2.0) [[id number]]  (Abnormal) Collected: 01/28/25 1459    Lab Status: Final result Specimen: Blood from Arm, Right Updated: 01/28/25 1526     LACTIC ACID 2.2 mmol/L     Narrative:      Result may be elevated if tourniquet was used during collection.    FLU/COVID Rapid Antigen (30 min. TAT) - Preferred screening test in ED [[id number]]  (Abnormal) Collected: 01/28/25 1503    Lab Status: Final result Specimen: Nares from Nose Updated: 01/28/25 1524     SARS COV Rapid Antigen Positive     Influenza A Rapid Antigen Negative     Influenza B Rapid Antigen Negative    Narrative:      This test has been performed using the Quidel Krysta 2 FLU+SARS Antigen test under the Emergency Use Authorization (EUA). This test has been validated by the  and verified by the performing laboratory. The Krysta uses lateral flow immunofluorescent sandwich assay to detect SARS-COV, Influenza A and Influenza B Antigen.     The Quidel Krysta 2 SARS Antigen test does not differentiate between SARS-CoV and SARS-CoV-2.     Negative results are presumptive and may be confirmed with a molecular assay, if necessary, for patient management. Negative results do not rule out SARS-CoV-2 or influenza infection and should not be used as the sole basis for treatment or patient management decisions. A negative test result may occur if the level of antigen in a sample is below the limit of detection of  this test.     Positive results are indicative of the presence of viral antigens, but do not rule out bacterial infection or co-infection with other viruses.     All test results should be used as an adjunct to clinical observations and other information available to the provider.    FOR PEDIATRIC PATIENTS - copy/paste COVID Guidelines URL to browser: [email or web address]    CBC and differential [[id number]]  (Abnormal) Collected: 01/28/25 1459    Lab Status: Final result Specimen: Blood from Arm, Right Updated: 01/28/25 1514     WBC 19.69 Thousand/uL      RBC 6.07 Million/uL      Hemoglobin 18.0 g/dL      Hematocrit 55.0 %      MCV 91 fL      MCH 29.7 pg      MCHC 32.7 g/dL      RDW 13.6 %      MPV 11.9 fL      Platelets 202 Thousands/uL      nRBC 0 /100 WBCs      Segmented % 91 %      Immature Grans % 1 %      Lymphocytes % 2 %      Monocytes % 6 %      Eosinophils Relative 0 %      Basophils Relative 0 %      Absolute Neutrophils 17.97 Thousands/µL      Absolute Immature Grans 0.09 Thousand/uL      Absolute Lymphocytes 0.41 Thousands/µL      Absolute Monocytes 1.19 Thousand/µL      Eosinophils Absolute 0.00 Thousand/µL      Basophils Absolute 0.03 Thousands/µL     Narrative:      This is an appended report.  These results have been appended to a previously verified report.    Blood culture #1 [[id number]] Collected: 01/28/25 1459    Lab Status: In process Specimen: Blood from Arm, Right Updated: 01/28/25 1506            CT head without contrast   Final Interpretation by Shree Zuluaga MD (01/28 1641)      No acute intracranial abnormality.  Chronic microangiopathic changes.                  Workstation performed: [id number]         XR chest portable    (Results Pending)       Procedures    ED Medication and Procedure Management   Prior to Admission Medications   Prescriptions Last Dose Informant Patient Reported? Taking?   lisinopril (ZESTRIL) 5 mg tablet    No No   Sig: TAKE 1 TABLET BY MOUTH DAILY   meloxicam (MOBIC) 15 mg tablet   No No   Sig: Take 1 tablet (15 mg total) by mouth daily   omeprazole (PriLOSEC) 20 mg delayed release capsule   No No   Sig: TAKE 1 CAPSULE BY MOUTH DAILY   rosuvastatin (CRESTOR) 5 mg tablet   No No   Sig: TAKE 1 TABLET BY MOUTH DAILY      Facility-Administered Medications: None     Patient's Medications   Discharge Prescriptions    No medications on file     No discharge procedures on file.  ED SEPSIS DOCUMENTATION   Time reflects when diagnosis was documented in both MDM as applicable and the Disposition within this note       Time User Action Codes Description Comment    1/28/2025  3:20 PM Gregory, Jerel Add [W19.XXXA] Fall, initial encounter     1/28/2025  3:27 PM Gregory, Jerel Add [U07.1] COVID     1/28/2025  3:27 PM Gregory, Jerel Add [Z66] DNR (do not resuscitate)     1/28/2025  3:27 PM Gregory, Jerel Add [Z78.9] DNI (do not intubate)     1/28/2025  3:29 PM Gregory, Jerel Add [N17.9] RASHIDA (acute kidney injury) (HCC)     1/28/2025  4:33 PM Gregory Jerel Add [T68.XXXA] Hypothermia, initial encounter     1/28/2025  4:34 PM Gregory Jerel Add [R79.89] Elevated troponin     1/28/2025  5:19 PM Azar Centeno Add [G93.41] Acute metabolic encephalopathy                  Jerel Gregory MD  01/28/25 [id number]

## 2025-01-28 NOTE — ASSESSMENT & PLAN NOTE
Will put the patient on remdesivir given risk factors I do not see an indication for steroids at this time.  COVID pathway.

## 2025-01-29 PROBLEM — Z71.89 GOALS OF CARE, COUNSELING/DISCUSSION: Status: ACTIVE | Noted: 2025-01-01

## 2025-01-29 PROBLEM — R54 FRAILTY SYNDROME IN GERIATRIC PATIENT: Status: ACTIVE | Noted: 2025-01-01

## 2025-01-29 PROBLEM — A41.9 SEPSIS (HCC): Status: ACTIVE | Noted: 2025-01-01

## 2025-01-29 PROBLEM — Z51.5 COMFORT MEASURES ONLY STATUS: Status: ACTIVE | Noted: 2025-01-01

## 2025-01-29 PROBLEM — R41.89 COGNITIVE DECLINE: Status: ACTIVE | Noted: 2025-01-01

## 2025-01-29 PROBLEM — Z51.5 PALLIATIVE CARE BY SPECIALIST: Status: ACTIVE | Noted: 2025-01-01

## 2025-01-29 PROBLEM — Z71.89 ENCOUNTER FOR HOME SAFETY REVIEW FOR INJURY PREVENTION: Status: ACTIVE | Noted: 2025-01-01

## 2025-01-29 PROBLEM — R26.2 AMBULATORY DYSFUNCTION: Status: ACTIVE | Noted: 2025-01-01

## 2025-01-29 NOTE — ASSESSMENT & PLAN NOTE
Decisional apparatus: Patient does not have capacity on exam today.  If capacity is lost, patient's substitute decision maker would default to daughter per POA documents  Advance Directive / Living Will / POLST / POA Forms: Yes, see scanned POA document/POLST    Goals  Level 4 code status.   COMFORT MEASURES ONLY  DNR/DNI limits placed.   Met with patient's daughter and son in law.  They recognize the patient's acute decline and do not want him to suffer. Discussion of comfort care presented.  Nancy expresses wanting to focus on comfort.     Comfort order placed.  Supportive listening, offered information to family.

## 2025-01-29 NOTE — ASSESSMENT & PLAN NOTE
Social support  Patient is supported by daughter  Supportive listening provided  Normalized experience of patient/family  Provided anxiety containment  Provided anticipatory guidance  Investigated spiritual needs, declined pastoral care services.    Follow up  Pending course, may consult hospice tomorrow for discharge planning.  Palliative Care will continue to follow for symptom management and goals of care discussions will be ongoing.    Palliative Medicine will sign off today as goals of care are clear and symptoms are well managed at this time.  Please reach out to on-call provider via Epic Secure Chat  if questions or concerns arise.  Please do not hesitate to reach our on call provider through our clinic answering service at [phone] should you have acute symptom control concerns.

## 2025-01-29 NOTE — ASSESSMENT & PLAN NOTE
Found on floor by daughter; patient unable to state events leading up to incident  At a baseline ambulates without assistive device, however daughter notes that he has a RW as needed  PT/OT following  Fall precautions  Out of bed as tolerated  Encourage early and frequent mobilization  Encourage adequate hydration and nutrition  Provide adequate pain management   Goal is SNF/LTC, daughter aware patient is not safe to return home  Continue with PT/OT for continued strength and balance training

## 2025-01-29 NOTE — ASSESSMENT & PLAN NOTE
Patient has had a cognitive decline over the past year per daughter, but is AAOx3 at baseline  Pt mumbling on arrival to ED and alert and oriented to self only  Continue recommend global delirium precautions including:  Establishment of day/night cycle via lights during the day and blinds open.  Please limit interruptions at night as medically appropriate.  Provide glasses/hearing aids as apprioriate.    Minimize deliriogenic medications as able.   Provide reorientation including date on board and visible clock.   Avoid restraints as able, frequent verbal reorientations or patient care sitter as appropriate.

## 2025-01-29 NOTE — CONSULTS
Consultation - Palliative Care   Name: Kevin Rodriguez 89 y.o. male I MRN: [id number]  Unit/Bed#: -01 I Date of Admission: 1/28/2025   Date of Service: 1/29/2025 I Hospital Day: 1   Inpatient consult to Palliative Care  Consult performed by: CHERRI Combs  Consult ordered by: CHERRI Walls        Physician Requesting Evaluation: Derek Moreno,*   Reason for Evaluation / Principal Problem: GOC / ARF, COVID    Assessment & Plan  Comfort measures only status  Comfort orders  Acetaminophen 650mg q6h PRN mild pain  Acetaminophen 1,000mg IV at 400ml/hr q6h PRN mod pain  Hydromorphone 0.3mg IV q2h PRN sev pain/dyspnea  Ondansetron 4mg IV q6h PRN nausea  Haldol 0.5mg IV q2h PRN agitation, vomiting  Robinul 0.1mg IV q4h PRN pulmonary secretions  Goals of care, counseling/discussion  Decisional apparatus: Patient does not have capacity on exam today.  If capacity is lost, patient's substitute decision maker would default to daughter per POA documents  Advance Directive / Living Will / POLST / POA Forms: Yes, see scanned POA document/POLST    Goals  Level 4 code status.   COMFORT MEASURES ONLY  DNR/DNI limits placed.   Met with patient's daughter and son in law.  They recognize the patient's acute decline and do not want him to suffer. Discussion of comfort care presented.  Nancy expresses wanting to focus on comfort.     Comfort order placed.  Supportive listening, offered information to family.    Palliative care by specialist  Social support  Patient is supported by daughter  Supportive listening provided  Normalized experience of patient/family  Provided anxiety containment  Provided anticipatory guidance  Investigated spiritual needs, declined pastoral care services.    Follow up  Pending course, may consult hospice tomorrow for discharge planning.  Palliative Care will continue to follow for symptom management and goals of care discussions will be ongoing.    Palliative Medicine will  sign off today as goals of care are clear and symptoms are well managed at this time.  Please reach out to on-call provider via Epic Secure Chat  if questions or concerns arise.  Please do not hesitate to reach our on call provider through our clinic answering service at [phone] should you have acute symptom control concerns.    Acute metabolic encephalopathy  Patient has had a cognitive decline over the past year per daughter, but is AAOx3 at baseline  Pt mumbling on arrival to ED and alert and oriented to self only  Continue recommend global delirium precautions including:  Establishment of day/night cycle via lights during the day and blinds open.  Please limit interruptions at night as medically appropriate.  Provide glasses/hearing aids as apprioriate.    Minimize deliriogenic medications as able.   Provide reorientation including date on board and visible clock.   Avoid restraints as able, frequent verbal reorientations or patient care sitter as appropriate.   COVID  Tested positive for COVID yesterday  Started on IV remdesivir  ARF (acute renal failure) (HCC)  POA  Hx of CKD3  ARF likely secondary to dehydration  Sepsis (HCC)  Pt found to have leukocytosis, hypothermia, tachycardia present on arrival secondary to COVID pneumonia  Started on IV fluids and Remdesivir  I have discussed the above management plan in detail with the primary service.       PDMP Review: I have reviewed the patient's controlled substance dispensing history in the Prescription Drug Monitoring Program in compliance with the [place] regulations before prescribing any controlled substances.  No data.     History of Present Illness   HPI: Kevin Rodriguez is a 89 y.o. year old male who presents with PMH of CKD, HTN, cognitive decline who presented to [facility] after being found by daughter in the home collapsed on the floor. Found to have COVID infection and acute renal failure. PSC team consulted for goals of care.     Review of Systems    Unable to perform ROS: Acuity of condition     I have reviewed the patient's PMH, PSH, Social History, Family History, Meds, and Allergies  Historical Information   Past Medical History:   Diagnosis Date    Elevated cholesterol     GERD (gastroesophageal reflux disease)     Hypertension     Sleep apnea      Past Surgical History:   Procedure Laterality Date    APPENDECTOMY       Social History     Tobacco Use    Smoking status: Former     Current packs/day: 0.00     Average packs/day: 0.3 packs/day for 10.0 years (2.5 ttl pk-yrs)     Types: Cigarettes     Start date:      Quit date: 1970     Years since quittin.1    Smokeless tobacco: Never   Vaping Use    Vaping status: Never Used   Substance and Sexual Activity    Alcohol use: Yes     Comment: glass of wine daily    Drug use: No    Sexual activity: Not on file     E-Cigarette/Vaping    E-Cigarette Use Never User      E-Cigarette/Vaping Substances    Nicotine No     THC No     CBD No     Flavoring No     Other No     Unknown No      Family history non-contributory  Social History     Tobacco Use    Smoking status: Former     Current packs/day: 0.00     Average packs/day: 0.3 packs/day for 10.0 years (2.5 ttl pk-yrs)     Types: Cigarettes     Start date:      Quit date: 1970     Years since quittin.1    Smokeless tobacco: Never   Vaping Use    Vaping status: Never Used   Substance and Sexual Activity    Alcohol use: Yes     Comment: glass of wine daily    Drug use: No    Sexual activity: Not on file       Current Facility-Administered Medications:     acetaminophen (Ofirmev) injection 1,000 mg, Q6H PRN, Last Rate: 1,000 mg (25)    acetaminophen (TYLENOL) tablet 650 mg, Q6H PRN    glycopyrrolate (ROBINUL) injection 0.1 mg, Q4H PRN    haloperidol lactate (HALDOL) injection 0.5 mg, Q2H PRN    HYDROmorphone (DILAUDID) injection 0.3 mg, Q2H PRN    ondansetron (ZOFRAN) injection 4 mg, Q6H PRN  Prior to Admission Medications   Prescriptions  Last Dose Informant Patient Reported? Taking?   lisinopril (ZESTRIL) 5 mg tablet Not Taking  No No   Sig: TAKE 1 TABLET BY MOUTH DAILY   Patient not taking: Reported on 1/29/2025   meloxicam (MOBIC) 15 mg tablet Not Taking  No No   Sig: Take 1 tablet (15 mg total) by mouth daily   Patient not taking: Reported on 1/29/2025   omeprazole (PriLOSEC) 20 mg delayed release capsule   No No   Sig: TAKE 1 CAPSULE BY MOUTH DAILY   rosuvastatin (CRESTOR) 5 mg tablet Not Taking  No No   Sig: TAKE 1 TABLET BY MOUTH DAILY   Patient not taking: Reported on 1/29/2025      Facility-Administered Medications: None     Patient has no known allergies.    Objective :  Temp:  [94 °F (34.4 °C)-98 °F (36.7 °C)] 97.5 °F (36.4 °C)  HR:  [] 95  BP: (107-160)/() 131/66  Resp:  [15-22] 19  SpO2:  [93 %-99 %] 93 %  O2 Device: None (Room air)    Physical Exam  Vitals and nursing note reviewed.   Constitutional:       General: He is not in acute distress.     Appearance: He is ill-appearing.      Interventions: Nasal cannula in place.   HENT:      Head: Normocephalic and atraumatic.   Eyes:      Conjunctiva/sclera: Conjunctivae normal.   Cardiovascular:      Rate and Rhythm: Normal rate and regular rhythm.      Heart sounds: No murmur heard.  Pulmonary:      Effort: Tachypnea and accessory muscle usage present.   Abdominal:      Palpations: Abdomen is soft.      Tenderness: There is no abdominal tenderness.   Musculoskeletal:         General: No swelling.      Cervical back: Neck supple.      Comments: Generally weak   Skin:     General: Skin is dry.      Coloration: Skin is pale.   Neurological:      Mental Status: He is lethargic.   Psychiatric:         Attention and Perception: He is inattentive.         Cognition and Memory: Cognition is impaired. Memory is impaired.            Lab Results: I have reviewed the following results:  Lab Results   Component Value Date/Time    SODIUM 141 01/29/2025 01:47 PM    K 5.2 01/29/2025 01:47 PM     BUN 64 (H) 01/29/2025 01:47 PM    CREATININE 1.44 (H) 01/29/2025 01:47 PM    GLUC 67 01/29/2025 01:47 PM    CALCIUM 7.0 (L) 01/29/2025 01:47 PM    AST 74 (H) 01/29/2025 01:47 PM    ALT 35 01/29/2025 01:47 PM    ALB 3.2 (L) 01/29/2025 01:47 PM    TP 5.7 (L) 01/29/2025 01:47 PM    EGFR 40 01/29/2025 01:47 PM     Lab Results   Component Value Date/Time    HGB 18.0 (H) 01/28/2025 02:59 PM    WBC 19.69 (H) 01/28/2025 02:59 PM     01/28/2025 09:56 PM     Lab Results   Component Value Date/Time    GDB7EOYGSOOK 0.774 05/24/2024 07:23 AM       Imaging Results Review: I reviewed radiology reports from this admission including: chest xray and CT head.  Other Study Results Review: EKG was reviewed.         Administrative Statements   I have spent a total time of 45+  minutes in caring for this patient on the day of the visit/encounter including Prognosis, Counseling / Coordination of care, Documenting in the medical record, Reviewing / ordering tests, medicine, procedures  , Obtaining or reviewing history  , and Communicating with other healthcare professionals . Topics discussed with the patient / family include symptom assessment and management, medication review, medication adjustment, psychosocial support, advanced directives, goals of care, hospice services, supportive listening, and anticipatory guidance.

## 2025-01-29 NOTE — PROGRESS NOTES
Progress Note - Hospitalist   Name: Kevin Rodriguez 89 y.o. male I MRN: [id number]  Unit/Bed#: -01 I Date of Admission: 1/28/2025   Date of Service: 1/29/2025 I Hospital Day: 1    Assessment & Plan  Acute metabolic encephalopathy  May be secondary to COVID.  He has underlying cognitive issues as well that might be worsening.  He was also hypothermic.  Try to reverse underlying causes and then if no improvement may need to consider neurology evaluation or may need to consider palliative care given his advanced age  Geriatric evaluation   Discussed hospitalization delirium precautions  Agree with UA microscopic initiation of ceftriaxone  Patient was recently declined from hospice  Patient does have multiple comorbid conditions prognosis is guarded  Lengthy discussion with daughter she was uncertain if she wanted treatment versus comfort even after lengthy discussion she was agreeable to a stat palliative care consult reached out to palliative care who came to the floor immediately and did a stat consult and daughter at this time desired comfort care and no aggressive measures  COVID  Continue COVID pathway   Severe discontinued in setting of daughter desired comfort care for her father  Elevated troponin  Doubt cardiac.  Could be secondary to acute renal failure.   Patient initially trended down with troponins from 335>2 98 however up trended to 398   No further intervention as patient has gone comfort care by the daughter  Essential hypertension  Discontinue ACE inhibitor in setting of rising creatinine and inability to take oral intake  Patient is now on comfort care no further vital sign monitoring  Gastroesophageal reflux disease without esophagitis  Patient evaluated by speech therapy patient is an aspiration risk not recommending any oral intake including medications   Switch PPI to IV  Hypothermia  Gracia hodges initially overall temp has improved  No further vital sign monitoring as daughter has gone  comfort care  ARF (acute renal failure) (HCC)  Gentle hydration and monitor.    Does have CKD stage III but likely dehydration from being on the floor  Creatinine has up trended however patient is on remdesivir  Give a one-time IV fluid bolus of 1 L if continues to uptrend will need to hold off on remdesivir  Daughter did not want restraints given father kept pulling out IV was back-and-forth momentarily on treatment versus comfort  That palliative care consult and daughter decided on comfort care  Ambulatory dysfunction    Frailty syndrome in geriatric patient    Encounter for home safety review for injury prevention    Cognitive decline    Sepsis (HCC)  Likely present on admission evident by leukocytosis, hypothermia, tachycardia in setting of COVID and pneumonia  Blood cultures x 2 pending continue to trend  Will give a one-time IV fluid bolus 1 L times now and resume on normal saline  Pro-Jerry noted to be elevated at 1.65 initiated on IV ceftriaxone  For completeness we will check a strep and Legionella  At this time daughter desires no further treatment and is made her dad comfort care  Palliative care by specialist    Goals of care, counseling/discussion    Comfort measures only status      VTE Pharmacologic Prophylaxis: VTE Score: 4 Moderate Risk (Score 3-4) - Pharmacological DVT Prophylaxis Contraindicated. Sequential Compression Devices Ordered.    Mobility:   Basic Mobility Inpatient Raw Score: 7  JH-HLM Goal: 2: Bed activities/Dependent transfer  JH-HLM Achieved: 2: Bed activities/Dependent transfer  JH-HLM Goal achieved. Continue to encourage appropriate mobility.    Patient Centered Rounds: I performed bedside rounds with nursing staff today.   Discussions with Specialists or Other Care Team Provider: Palliative care management    Education and Discussions with Family / Patient: Updated  (daughter and son in law) at bedside.    Current Length of Stay: 1 day(s)  Current Patient Status:  Inpatient   Certification Statement: The patient will continue to require additional inpatient hospital stay due to COVID-pneumonia with need for goals of care and now safe dispo planning  Discharge Plan:  To be determined    Code Status: Level 4 - Comfort Care    Subjective   Patient after sternal rub was able to open his eyes but could not give direction to his care.  Lengthy discussion with daughter and she was uncertain and her dad would make the recovery to return home and be independent as he was before and she is realistic of his age.  Daughter's main desire she did not like the way he was breathing after discussion of continuing treatment versus comfort she remained uncertain she was agreeable to a stat palliative consult to make further determination.    Objective :  Temp:  [94 °F (34.4 °C)-98 °F (36.7 °C)] 97.5 °F (36.4 °C)  HR:  [92-95] 95  BP: (107-160)/() 131/66  Resp:  [16-22] 19  SpO2:  [93 %-99 %] 93 %  O2 Device: None (Room air)    Body mass index is 25.97 kg/m².     Input and Output Summary (last 24 hours):     Intake/Output Summary (Last 24 hours) at 1/29/2025 1701  Last data filed at 1/29/2025 0900  Gross per 24 hour   Intake 0 ml   Output 100 ml   Net -100 ml       Physical Exam  Vitals and nursing note reviewed.   Constitutional:       General: He is not in acute distress.     Appearance: He is well-developed. He is ill-appearing.   HENT:      Head: Normocephalic and atraumatic.   Cardiovascular:      Rate and Rhythm: Regular rhythm. Tachycardia present.      Heart sounds: No murmur heard.  Pulmonary:      Effort: Accessory muscle usage present. No respiratory distress.      Breath sounds: Examination of the right-upper field reveals rales. Examination of the left-upper field reveals rales. Examination of the right-middle field reveals rales. Examination of the right-lower field reveals rales. Examination of the left-lower field reveals rales. Rales present.   Abdominal:      Palpations:  Abdomen is soft.      Tenderness: There is no abdominal tenderness.   Musculoskeletal:         General: No swelling.   Skin:     General: Skin is warm and dry.      Capillary Refill: Capillary refill takes less than 2 seconds.           Lines/Drains:              Lab Results: I have reviewed the following results:   Results from last 7 days   Lab Units 01/28/25  2156 01/28/25  1459   WBC Thousand/uL  --  19.69*   HEMOGLOBIN g/dL  --  18.0*   HEMATOCRIT %  --  55.0*   PLATELETS Thousands/uL 218 202   SEGS PCT %  --  91*   LYMPHO PCT %  --  2*   MONO PCT %  --  6   EOS PCT %  --  0     Results from last 7 days   Lab Units 01/29/25  1347   SODIUM mmol/L 141   POTASSIUM mmol/L 5.2   CHLORIDE mmol/L 111*   CO2 mmol/L 17*   BUN mg/dL 64*   CREATININE mg/dL 1.44*   ANION GAP mmol/L 13   CALCIUM mg/dL 7.0*   ALBUMIN g/dL 3.2*   TOTAL BILIRUBIN mg/dL 1.05*   ALK PHOS U/L 48   ALT U/L 35   AST U/L 74*   GLUCOSE RANDOM mg/dL 67                 Results from last 7 days   Lab Units 01/28/25 2156 01/28/25  1946 01/28/25  1459   LACTIC ACID mmol/L  --  2.2* 2.2*   PROCALCITONIN ng/ml 1.65*  --   --        Recent Cultures (last 7 days):   Results from last 7 days   Lab Units 01/28/25  1527 01/28/25  1459   BLOOD CULTURE  Received in Microbiology Lab. Culture in Progress. Received in Microbiology Lab. Culture in Progress.       Imaging Results Review: I reviewed radiology reports from this admission including: chest xray.  Other Study Results Review: EKG was reviewed.     Last 24 Hours Medication List:     Current Facility-Administered Medications:     acetaminophen (Ofirmev) injection 1,000 mg, Q6H PRN, Last Rate: 1,000 mg (01/28/25 1937)    acetaminophen (TYLENOL) tablet 650 mg, Q6H PRN    glycopyrrolate (ROBINUL) injection 0.1 mg, Q4H PRN    haloperidol lactate (HALDOL) injection 0.5 mg, Q2H PRN    HYDROmorphone (DILAUDID) injection 0.3 mg, Q2H PRN    ondansetron (ZOFRAN) injection 4 mg, Q6H PRN    Administrative Statements    Today, Patient Was Seen By: CHERRI Walls  I have spent a total time of 55 minutes in caring for this patient on the day of the visit/encounter including Prognosis, Risks and benefits of tx options, Patient and family education, Counseling / Coordination of care, Documenting in the medical record, Reviewing / ordering tests, medicine, procedures  , Obtaining or reviewing history  , and Communicating with other healthcare professionals .    **Please Note: This note may have been constructed using a voice recognition system.**

## 2025-01-29 NOTE — QUICK NOTE
"Notified by RN patient with \"concerning breathing pattern\" requesting bedside evaluation. Rectal temp 95.3F, remainder of VSS, SpO2 % on RA. Upon approach patient resting in bed with normal WOB, no accessory muscle use, intercostal or suprasternal retractions noted. Patient noted to have occasional grunting with respirations. B/l basilar rhonchi appreciated on auscultation.  Will trial DuoNeb, respiratory protocol initiated.  Additionally, patient with right lower extremity unilateral swelling.  RN reports difficulty obtaining labs including D-dimer and BNP, advised to reach out to ICU for support.  Will order RLE venous duplex.  "

## 2025-01-29 NOTE — ASSESSMENT & PLAN NOTE
At a baseline is alert and oriented x  2/3  On exam patient is alert and oriented x 1, to self only  Became confused/found on floor covered in urine by daughter yesterday 1/28  She notes his speech was concerning because he was mumbling, garbled and not at baseline  Identify and treat reversible causes of confusion including infection, dehydration, electrolyte imbalance, anemia, hypoxia, urinary retention, constipation, pain, and sleep disturbance  Most likely caused by Covid, unknown downtime on floor, however daughter also stating patient complaining of urinary frequency/urgency, possible UTI  Elevated WBC, Lactic acid, Procal  Pending results of CXR  At risk for delirium due to acute encephalopathy  Recommend delirium precautions  Maintain sleep-wake cycle, avoid nighttime interruptions  Provide adequate pain control  Avoid urinary retention and constipation  Provide frequent and early mobilization  Provide frequent redirection and reorientation as needed  Avoid medications that may worsen or precipitate delirium such as tramadol, benzodiazepines, anticholinergics, and Benadryl  Redirect unwanted behaviors as first-line therapy, avoid physical restraints as able to  Continue to monitor

## 2025-01-29 NOTE — ASSESSMENT & PLAN NOTE
Continue COVID pathway   Severe discontinued in setting of daughter desired comfort care for her father

## 2025-01-29 NOTE — ASSESSMENT & PLAN NOTE
Tested and positive for Covid yesterday  No noted symptoms per daughter at home  Remdesivir IV d/t NPO status  Pending CXR results  As needed NC  Continue to monitor O2   Supportive measures

## 2025-01-29 NOTE — ASSESSMENT & PLAN NOTE
Patient evaluated by speech therapy patient is an aspiration risk not recommending any oral intake including medications   Switch PPI to IV

## 2025-01-29 NOTE — PLAN OF CARE
Recommendations:   Diet: NPO   Meds: non-oral if possible   Frequent Oral care: 2-4x/day  Aspiration precautions and compensatory swallowing strategies: upright posture  Other Recommendations/ considerations: SLP will follow-up for re-evaluation as pt status permits.

## 2025-01-29 NOTE — CASE MANAGEMENT
Case Management Assessment & Discharge Planning Note    Patient name Kevin Rodriguez  Location /-01 MRN [id number]  : 1926 Date 2025       Current Admission Date: 2025  Current Admission Diagnosis:Acute metabolic encephalopathy   Patient Active Problem List    Diagnosis Date Noted Date Diagnosed    Ambulatory dysfunction 2025     Frailty syndrome in geriatric patient 2025     Encounter for home safety review for injury prevention 2025     Cognitive decline 2025     Sepsis (HCC) 2025     Acute metabolic encephalopathy 2025     COVID 2025     Hypothermia 2025     ARF (acute renal failure) (HCC) 2025     Elevated troponin 2025     Hypertensive kidney disease with CKD (chronic kidney disease) 2024     Right shoulder pain 2024     Right leg pain 2024     Iron deficiency anemia 2023     Blood in stool, so 2022     Osteoarthritis, generalized 2022     Stage 3a chronic kidney disease (HCC) 2022     Arthritis of left shoulder region 2022     Contusion of head 2021     Fall 2020     Arthritis of right shoulder region 2019     Acute bilateral low back pain without sciatica 2019     Elevated PSA 2019     Ankle edema, bilateral 2019     Essential hypertension 2018     Mixed hyperlipidemia 2018     Gastroesophageal reflux disease without esophagitis 2018       LOS (days): 1  Geometric Mean LOS (GMLOS) (days): 4.6  Days to GMLOS:3.8     OBJECTIVE:    Risk of Unplanned Readmission Score: 8.17         Current admission status: Inpatient       Preferred Pharmacy:   [facility] ([facility]) - [address] [address]  Phone: [phone] Fax: [phone]    [id number] - [place] [address] - [address]  [address]  [facility] [address]  Phone: [phone] Fax: [phone]    [facility] - [place], [place] - [address]  [address]  [facility] [address]  [facility] [address]  Phone: [phone] Fax: [phone]    Primary Care Provider: Parth Torres DO    Primary Insurance: MEDICARE  Secondary Insurance:     ASSESSMENT:  Active Health Care Proxies    There are no active Health Care Proxies on file.       Advance Directives  Does patient have a Health Care POA?: Yes  Does patient have Advance Directives?: Yes  Advance Directives: Power of  for health care  Primary Contact: Nancy Pimentel (Daughter)  [phone]         Readmission Root Cause  30 Day Readmission: No    Patient Information  Admitted from:: Home  Mental Status: Alert, Confused  During Assessment patient was accompanied by: Not accompanied during assessment  Assessment information provided by:: Daughter (Pt is on airborn contact)  Primary Caregiver: Self  Support Systems: Self, Daughter  County of Residence: [place]  What city do you live in?: [place]  Living Arrangements: Lives Alone  Is patient a ?: No    Activities of Daily Living Prior to Admission  Functional Status: Independent  Completes ADLs independently?: Yes  Ambulates independently?: No  Level of ambulatory dependence: Assistance  Does patient use assisted devices?: Yes  Assisted Devices (DME) used: Walker  Does patient currently own DME?: Yes  What DME does the patient currently own?: Walker  Does patient have a history of Outpatient Therapy (PT/OT)?: No  Does the patient have a history of Short-Term Rehab?: No  Does patient have a history of HHC?: No  Does patient currently have HHC?: No         Patient Information Continued  Income Source: Pension/skilled nursing  Does patient have prescription coverage?: Yes  Does patient receive dialysis treatments?: No  Does patient have a history of substance abuse?: No  Does patient have a history of  Mental Health Diagnosis?: No         Means of Transportation  Means of Transport to Appts:: Family transport          DISCHARGE DETAILS:    Discharge planning discussed with:: Pt dtr  Freedom of Choice: Yes  Comments - Freedom of Choice: CM called pt dtr Mor and introduced self/role. FOC discussed at length. Pt has an [facility] 7. PT/OT consulted. Pt dtr states that pt lives alone recently hired a caregiver once a week. Dtr expressed her frustration of the lack of services pt is able to receive at home. CM provided emotional support and assured pt dtr that approperiate referrals will be made prior to dc. CM continues to follow and assist with pt dc plans.  CM contacted family/caregiver?: Yes  Were Treatment Team discharge recommendations reviewed with patient/caregiver?: Yes  Did patient/caregiver verbalize understanding of patient care needs?: Yes  Were patient/caregiver advised of the risks associated with not following Treatment Team discharge recommendations?: Yes    Contacts  Patient Contacts: Nancy Pimentel (Daughter)  Relationship to Patient:: Family  Contact Method: Phone  Phone Number: [phone]  Reason/Outcome: Continuity of Care, Emergency Contact, Referral, Discharge Planning              Other Referral/Resources/Interventions Provided:  Interventions: None Indicated    Would you like to participate in our [facility] service program?  : No - Declined    Treatment Team Recommendation: Home  Discharge Destination Plan:: Home  Transport at Discharge : Family, Kaveh tubbs

## 2025-01-29 NOTE — ASSESSMENT & PLAN NOTE
Noted temp in ED of 89.4  Placed on [facility]  Temp 97.3 this AM  .2, on floor for unknown time, last known well on Sunday

## 2025-01-29 NOTE — PLAN OF CARE
Problem: Prexisting or High Potential for Compromised Skin Integrity  Goal: Skin integrity is maintained or improved  Description: INTERVENTIONS:  - Identify patients at risk for skin breakdown  - Assess and monitor skin integrity  - Assess and monitor nutrition and hydration status  - Monitor labs   - Assess for incontinence   - Turn and reposition patient  - Assist with mobility/ambulation  - Relieve pressure over bony prominences  - Avoid friction and shearing  - Provide appropriate hygiene as needed including keeping skin clean and dry  - Evaluate need for skin moisturizer/barrier cream  - Collaborate with interdisciplinary team   - Patient/family teaching  - Consider wound care consult   Outcome: Progressing     Problem: SAFETY ADULT  Goal: Patient will remain free of falls  Description: INTERVENTIONS:  - Educate patient/family on patient safety including physical limitations  - Instruct patient to call for assistance with activity   - Consult OT/PT to assist with strengthening/mobility   - Keep Call bell within reach  - Keep bed low and locked with side rails adjusted as appropriate  - Keep care items and personal belongings within reach  - Initiate and maintain comfort rounds  - Make Fall Risk Sign visible to staff  - Offer Toileting every 2 Hours, in advance of need  - Initiate/Maintain bed  alarm  - Obtain necessary fall risk management equipment: call bell within reach, bed in lowest position  - Apply yellow socks and bracelet for high fall risk patients  - Consider moving patient to room near nurses station  Outcome: Progressing

## 2025-01-29 NOTE — ASSESSMENT & PLAN NOTE
Doubt cardiac.  Could be secondary to acute renal failure.   Patient initially trended down with troponins from 335>2 98 however up trended to 398   No further intervention as patient has gone comfort care by the daughter

## 2025-01-29 NOTE — NURSING NOTE
Patient arrived to unit. Rectal temp 95.3. Bare hugger applied. Unable to obtain labs. Informed overnight provider fingertips are cyanotic and breathing pattern is concerning. Provider coming up to assess patient. Vitals stable, suction bedside.

## 2025-01-29 NOTE — ASSESSMENT & PLAN NOTE
Gentle hydration and monitor.    Does have CKD stage III but likely dehydration from being on the floor  Creatinine has up trended however patient is on remdesivir  Give a one-time IV fluid bolus of 1 L if continues to uptrend will need to hold off on remdesivir  Daughter did not want restraints given father kept pulling out IV was back-and-forth momentarily on treatment versus comfort  That palliative care consult and daughter decided on comfort care

## 2025-01-29 NOTE — SPEECH THERAPY NOTE
Pt now Level 4- Comfort; pleasure feeds initiated.  Dysphagia intervention no longer warranted at this time.  SLP will sign off.  Please reorder should pt status/plan of care change.  D/w CHERRI Rasheed MS, CCC-SLP  Speech-Language Pathologist  [place] #[id number]  [place] #[id number]

## 2025-01-29 NOTE — ASSESSMENT & PLAN NOTE
Discontinue ACE inhibitor in setting of rising creatinine and inability to take oral intake  Patient is now on comfort care no further vital sign monitoring

## 2025-01-29 NOTE — SPEECH THERAPY NOTE
Speech-Language Pathology Bedside Swallow Evaluation        Patient Name: Kevin Rodriguez  Today's Date: 1/29/2025     Problem List  Principal Problem:    Acute metabolic encephalopathy  Active Problems:    Essential hypertension    Gastroesophageal reflux disease without esophagitis    COVID    Hypothermia    ARF (acute renal failure) (HCC)    Elevated troponin    Ambulatory dysfunction    Frailty syndrome in geriatric patient    Encounter for home safety review for injury prevention       Past Medical History  Past Medical History:   Diagnosis Date    Elevated cholesterol     GERD (gastroesophageal reflux disease)     Hypertension     Sleep apnea        Past Surgical History  Past Surgical History:   Procedure Laterality Date    APPENDECTOMY         Summary/Impressions:    Pt presents with s/s oropharyngeal dysphagia characterized by poor bolus retrieval, weak, incomplete oral transfer.  Delayed reflexive swallow as per laryngeal palpation; hyolaryngeal excursion appears reduced w/ audible gulp.  Delayed, overt coughing following dry swallows (?secretion mgmt) as well as very small quantities of PO (trace).  Wet/gurgled vocal quality.  Clears with cued cough.  Further attempts held for pt safety.  Frequent oral suctioning rendered via [place] in which majority of PO offerings retrieved.  Cannot r/o aspiration of PO and/or secretions at this time.     Recommendations:   Diet: NPO   Meds: non-oral if possible   Frequent Oral care: 2-4x/day  Aspiration precautions and compensatory swallowing strategies: upright posture  Other Recommendations/ considerations: SLP will follow-up for re-evaluation as pt status permits.     Current Medical Status  Pt is a 89 y.o. male who presented to [facility] with  PMH of cognitive issues and hypertension who presents with the floor.  This is a 89-year-old male patient who was found on the floor by his daughter today.  When she went to the house to check up on him he was on  the ground covered in urine.  He was also mumbling which is not his baseline.  She last saw him on Sunday and states he was in his usual state of health.  He does have some cognitive decline but you could normally hold a conversation.  She does not know about any fevers or chills.  He is supposed to be ambulating with a walker but he does that intermittently.  Unfortunately no other history was able to be obtained at this time except the patient is not his baseline mental status.    Past medical history:  Please see H&P for details    Special Studies:  1/28/25 Chest XR:  There is suggestion of a left-sided infrahilar infiltrate. This may represent pneumonia. Suggest continued follow-up.     1/28/25 CT head:  No acute intracranial abnormality. Chronic microangiopathic changes.     Social/Education/Vocational Hx:  Pt lives alone    Swallow Information   Current Risks for Dysphagia & Aspiration: AMS  Current Symptoms/Concerns:  AMS  Current Diet: NPO except medications   Baseline Diet:  unknown    Baseline Assessment   Behavior/Cognition: waxing and waning arousal level  Speech/Language Status: not able to to follow commands  Patient Positioning: upright in bed    Swallow Mechanism Exam   Facial: symmetrical  Labial: unable to test 2/2 limited command following  Lingual: unable to test 2/2 limited command following  Velum: unable to visualize  Mandible: unable to test 2/2 limited command following  Dentition: limited dentition  Vocal quality:weak   Volitional Cough: unable to initiate volitional cough   Respiratory: RA    Consistencies Assessed and Performance   Consistencies Administered:  trace quantity puree solid, <1/2 tsp thin    Oral Stage: Tactile thermal stimulation utilized to initate swallow prior to small PO offerings.  Unable to initiate labial seal to straw for liquid extraction.  Material provided via spoon dip, <1/2 teaspoon quantity.  Noted prolonged/incomplete oral transfer with material lining lingual  surface.  Frequent oral suction rendered.     Pharyngeal Stage: Laryngeal rise noted upon palpation.  Swallow initiation appears delayed, ?incomplete at times w/ audible gulp.  Delayed overt coughing with wet vocal quality following trace quantities of PO, as well as dry swallows (?secretion mgmt).  Additional trials held for pt safety.     Esophageal Concerns: none reported    Results Reviewed with: patient, RN, and CRNP     Plan  Will continue to follow for x1-3    Dysphagia Goals: pt will participate in repeat swallow eval to determine safety of po intake and/or further instrumental testing.        Shanelle Rasheed MS, CCC-SLP  Speech-Language Pathologist  [place] #[id number]  [place] #[id number]

## 2025-01-29 NOTE — RESPIRATORY THERAPY NOTE
RT Protocol Note  Kevin Rodriguez 89 y.o. male MRN: [id number]  Unit/Bed#: -01 Encounter: [id number]    Assessment    Principal Problem:    Acute metabolic encephalopathy  Active Problems:    Essential hypertension    Gastroesophageal reflux disease without esophagitis    COVID    Hypothermia    ARF (acute renal failure) (HCC)    Elevated troponin      Home Pulmonary Medications:     25 2300   Respiratory Assessment   Resp Comments Pt is confused and not able to answer questions for resp protocol. Pt is resting and is in no resp distress. d/c protocol at this time.   Additional Assessments   SpO2 94 %            Past Medical History:   Diagnosis Date    Elevated cholesterol     GERD (gastroesophageal reflux disease)     Hypertension     Sleep apnea      Social History     Socioeconomic History    Marital status: /Civil Union     Spouse name: Not on file    Number of children: Not on file    Years of education: Not on file    Highest education level: Not on file   Occupational History    Not on file   Tobacco Use    Smoking status: Former     Current packs/day: 0.00     Average packs/day: 0.3 packs/day for 10.0 years (2.5 ttl pk-yrs)     Types: Cigarettes     Start date:      Quit date: 1970     Years since quittin.1    Smokeless tobacco: Never   Vaping Use    Vaping status: Never Used   Substance and Sexual Activity    Alcohol use: Yes     Comment: glass of wine daily    Drug use: No    Sexual activity: Not on file   Other Topics Concern    Not on file   Social History Narrative    Not on file     Social Drivers of Health     Financial Resource Strain: Low Risk  (2023)    Overall Financial Resource Strain (CARDIA)     Difficulty of Paying Living Expenses: Not very hard   Food Insecurity: Not on file   Transportation Needs: No Transportation Needs (2023)    PRAPARE - Transportation     Lack of Transportation (Medical): No     Lack of Transportation (Non-Medical): No   Physical  Activity: Not on file   Stress: Not on file   Social Connections: Not on file   Intimate Partner Violence: Not on file   Housing Stability: Not on file       Subjective         Objective    Physical Exam:        Vitals:  Blood pressure 124/100, pulse 94, temperature 97.7 °F (36.5 °C), temperature source Rectal, resp. rate 22, weight 66.5 kg (146 lb 9.7 oz), SpO2 99%.          Imaging and other studies:           Plan             Resp Comments: Pt is confused and not able to answer questions for resp protocol. Pt is resting and is in no resp distress. d/c protocol at this time.

## 2025-01-29 NOTE — CONSULTS
Consultation - Geriatric Medicine   Name: Kevin Rodriguez 89 y.o. male I MRN: [id number]  Unit/Bed#: -01 I Date of Admission: 1/28/2025   Date of Service: 1/29/2025 I Hospital Day: 1   Inpatient consult to Gerontology  Consult performed by: CHERRI Baires  Consult ordered by: Azar Centeno MD        Physician Requesting Evaluation: Derek Moreno,*   Reason for Evaluation / Principal Problem: Acute metabolic encephalopathy    Assessment & Plan  Acute metabolic encephalopathy  At a baseline is alert and oriented x  2/3  On exam patient is alert and oriented x 1, to self only  Became confused/found on floor covered in urine by daughter yesterday 1/28  She notes his speech was concerning because he was mumbling, garbled and not at baseline  Identify and treat reversible causes of confusion including infection, dehydration, electrolyte imbalance, anemia, hypoxia, urinary retention, constipation, pain, and sleep disturbance  Most likely caused by Covid, unknown downtime on floor, however daughter also stating patient complaining of urinary frequency/urgency, possible UTI  Elevated WBC, Lactic acid, Procal  Pending results of CXR  At risk for delirium due to acute encephalopathy  Recommend delirium precautions  Maintain sleep-wake cycle, avoid nighttime interruptions  Provide adequate pain control  Avoid urinary retention and constipation  Provide frequent and early mobilization  Provide frequent redirection and reorientation as needed  Avoid medications that may worsen or precipitate delirium such as tramadol, benzodiazepines, anticholinergics, and Benadryl  Redirect unwanted behaviors as first-line therapy, avoid physical restraints as able to  Continue to monitor  Cognitive decline  Noted cognitive decline by daughter over past year  Alert and Oriented X 1 to self only today upon exam  Baseline: AOx2/3, self, place, sometimes time/situation  No formal cognitive testing per chart  review  CT scan: chronic moderate microangiopathy, negative for acute intracranial abnormality  TSH 5/2024: 0.774, No Vit B12  Encourage physical, social and mental activities as able  Do not recommend medications to improve memory at this time due to advanced age, risks vs benefits  Delirium precautions  Ambulatory dysfunction  Found on floor by daughter; patient unable to state events leading up to incident  At a baseline ambulates without assistive device, however daughter notes that he has a RW as needed  PT/OT following  Fall precautions  Out of bed as tolerated  Encourage early and frequent mobilization  Encourage adequate hydration and nutrition  Provide adequate pain management   Goal is SNF/LTC, daughter aware patient is not safe to return home  Continue with PT/OT for continued strength and balance training   Frailty syndrome in geriatric patient  Previously noted as Mildly frail due to independence with ADLs, needing some assistance with IADLs, however due to current condition consider Moderate Frailty  Multifactorial in the setting of advanced age, comorbidities, lives alone, current acute conditions  Albumin 4.2  Currently NPO  PT/OT/ST consulted   COVID  Tested and positive for Covid yesterday  No noted symptoms per daughter at home  Remdesivir IV d/t NPO status  Pending CXR results  As needed NC  Continue to monitor O2   Supportive measures  Hypothermia  Noted temp in ED of 89.4  Placed on Gracia hugger  Temp 97.3 this AM  .2, on floor for unknown time, last known well on Sunday  ARF (acute renal failure) (HCC)  Lab Results   Component Value Date    CREATININE 1.71 (H) 01/28/2025    CREATININE 1.39 (H) 01/28/2025    EGFR 32 01/28/2025    EGFR 41 01/28/2025       Hx of Stage 3a CKD  Baseline Cr appears to be between 1-1.1  Receiving IV 75 mL/hr  Likely dehydration d/t unknown LKW time and on floor of home  Elevated troponin  Trop 0hr 335 -> 2hr 236 (delta -99) -> 4hr 398 (delta 63)  Telemetry  monitoring  Encounter for home safety review for injury prevention  Lives at home alone  Daughter lives 5 minutes away and visits couple times/week  Per daughter, pt independent with ADLs for the most part  Had been looking into assistance last May and does not know why Hospice was considered, however knows not accepted, but did not receive follow up with help at home for patient  Daughter had hired a visiting aide to start coming once per week however never started prior to this admission  Patient does not drive  Has not taken medications for over a year due to not needing them, not noncompliance  No reported falls within the last 6 months  Spoke with daughter about home safety and in agreement that patient should not return home      History of Present Illness   Hx and PE limited by: Acute condition, spoke with daughter Nancy on the phone for HPI  HPI: Kevin Rodriguez is a 89 y.o. year old male who presented to the ED after being found by his daughter on the floor yesterday covered in urine.  She last saw him on Sunday and states he can normally hold a conversation, however, when she found him on the floor he was mumbling and had garbled speech.  CT of his head showing no acute intracranial abnormality.  He is being evaluated today with geriatrics.    He has a past medical history of hypertension, sleep apnea, stage IIIa CKD, iron deficiency anemia, arthritis, BPH.     He lives alone and his daughter lives 5 minutes away and visits several times a week, bringing him food and making sure he is doing well. He is independent with ADLs/needs assistance with IADLs per daughter. She had suggested last year in May to have someone come once a month to help him clean his home, keep him company, help him if needed, and he was evaluated by Hospice and did not qualify, however daughter reports no follow up with assistance. She denies any recent falls besides this most recent event when she found him on the floor. She  reports decrease in appetite, but has been ongoing for years, will eat if food placed in front of him independently. She states he has urinary leakage, questionable incontinence, but notes over the past week the patient has stated he has urgency/frequency. She states he has not taken medications in a year.     On exam today, the patient is sitting up in bed. He is alert, however is disoriented. He is alert to self, spelling his name and stating his birthday. Disoriented to situation, place, time. His daughter states that his memory waxes and wanes and is oriented to self, place, sometimes time/situation; baseline AOx2/3.     Review of Systems   Unable to perform ROS: Acuity of condition (AOx1, to self only)       Geriatric Conditions: Memory: cognitive decline noted over past year per daughter, Assistive Devices:  Has RW in home, but does not use it, Fraility: Moderate, Nutrition/weight loss: no noted weight loss over pat 6 months, no decrease in appetite, Vision impairment: wears glasses, Incontinence: reports urgency/frequency, iADL's:  needs some assistance, ADL's:  independent    I have reviewed the patient's PMH, PSH, Social History, Family History, Meds, and Allergies  Historical Information   Past Medical History:   Diagnosis Date    Elevated cholesterol     GERD (gastroesophageal reflux disease)     Hypertension     Sleep apnea      Past Surgical History:   Procedure Laterality Date    APPENDECTOMY       Social History     Tobacco Use    Smoking status: Former     Current packs/day: 0.00     Average packs/day: 0.3 packs/day for 10.0 years (2.5 ttl pk-yrs)     Types: Cigarettes     Start date:      Quit date: 1970     Years since quittin.1    Smokeless tobacco: Never   Vaping Use    Vaping status: Never Used   Substance and Sexual Activity    Alcohol use: Yes     Comment: glass of wine daily    Drug use: No    Sexual activity: Not on file     E-Cigarette/Vaping    E-Cigarette Use Never User       E-Cigarette/Vaping Substances    Nicotine No     THC No     CBD No     Flavoring No     Other No     Unknown No      Family History   Problem Relation Age of Onset    No Known Problems Mother     No Known Problems Father      Social History     Tobacco Use    Smoking status: Former     Current packs/day: 0.00     Average packs/day: 0.3 packs/day for 10.0 years (2.5 ttl pk-yrs)     Types: Cigarettes     Start date:      Quit date: 1970     Years since quittin.1    Smokeless tobacco: Never   Vaping Use    Vaping status: Never Used   Substance and Sexual Activity    Alcohol use: Yes     Comment: glass of wine daily    Drug use: No    Sexual activity: Not on file       Current Facility-Administered Medications:     acetaminophen (Ofirmev) injection 1,000 mg, Q6H PRN, Last Rate: 1,000 mg (25)    acetaminophen (TYLENOL) tablet 650 mg, Q6H PRN    heparin (porcine) subcutaneous injection 5,000 Units, Q8H TERESA **AND** [COMPLETED] Platelet count, Once    ipratropium-albuterol (DUO-NEB) 0.5-2.5 mg/3 mL inhalation solution 3 mL, Once    lisinopril (ZESTRIL) tablet 5 mg, Daily    ondansetron (ZOFRAN) injection 4 mg, Q6H PRN    pantoprazole (PROTONIX) EC tablet 20 mg, Early Morning    pravastatin (PRAVACHOL) tablet 40 mg, Daily With Dinner    [COMPLETED] remdesivir (Veklury) 200 mg in sodium chloride 0.9 % 290 mL IVPB, Q24H **FOLLOWED BY** remdesivir (Veklury) 100 mg in sodium chloride 0.9 % 270 mL IVPB, Q24H    sodium chloride 0.9 % infusion, Continuous, Last Rate: 75 mL/hr (25)  Prior to Admission Medications   Prescriptions Last Dose Informant Patient Reported? Taking?   lisinopril (ZESTRIL) 5 mg tablet   No No   Sig: TAKE 1 TABLET BY MOUTH DAILY   meloxicam (MOBIC) 15 mg tablet   No No   Sig: Take 1 tablet (15 mg total) by mouth daily   omeprazole (PriLOSEC) 20 mg delayed release capsule   No No   Sig: TAKE 1 CAPSULE BY MOUTH DAILY   rosuvastatin (CRESTOR) 5 mg tablet   No No   Sig: TAKE 1  TABLET BY MOUTH DAILY      Facility-Administered Medications: None     Patient has no known allergies.    Meds/Allergies   Home medication review  Per daughter, patient has not taken any medications in a year    Objective :  Temp:  [89.4 °F (31.9 °C)-98 °F (36.7 °C)] 97.3 °F (36.3 °C)  HR:  [] 95  BP: (107-160)/() 131/66  Resp:  [15-24] 19  SpO2:  [93 %-99 %] 93 %  O2 Device: None (Room air)    Physical Exam  Vitals and nursing note reviewed.   Constitutional:       Appearance: He is ill-appearing.   HENT:      Mouth/Throat:      Mouth: Mucous membranes are dry.   Cardiovascular:      Rate and Rhythm: Normal rate and regular rhythm.      Pulses: Normal pulses.      Heart sounds: Normal heart sounds.   Pulmonary:      Effort: Pulmonary effort is normal.      Breath sounds: Normal breath sounds.   Abdominal:      General: Abdomen is flat. Bowel sounds are normal.      Palpations: Abdomen is soft.   Musculoskeletal:      Right lower leg: Edema present.      Left lower leg: Edema present.   Skin:     General: Skin is warm and dry.      Capillary Refill: Capillary refill takes less than 2 seconds.   Neurological:      Mental Status: He is alert. He is disoriented.      Motor: Weakness (generalized) present.      Gait: Gait abnormal (prior to hospitalization, found on floor unable to get up).      Comments: AOx1           Lab Results: I have reviewed the following results:CBC/BMP:   .     01/28/25  1459 01/28/25 2156 01/28/25  2328   WBC 19.69*  --   --    HGB 18.0*  --   --    HCT 55.0*  --   --     218  --    SODIUM 136  --  139   K 4.4  --  4.6   CL 98  --  99   CO2 19*  --  19*   BUN 55*  --  66*   CREATININE 1.39*  --  1.71*   GLUC 107  --  86    , LFTs:   .     01/28/25 1459   *   ALT 43   ALB 4.2   TBILI 1.81*   ALKPHOS 77    , Troponin,BNP:  .     01/28/25  1459 01/28/25 1946   HSTNI0 335*  --    HSTNI2  --  236*    , Lactic Acid:   .     01/28/25 1946   LACTICACID 2.2*    ,  "Procalcitonin:   Lab Results   Component Value Date    PROCALCITONI 1.65 (H) 01/28/2025   , TSH:   , Blood Culture:   Lab Results   Component Value Date    BLOODCX Received in Microbiology Lab. Culture in Progress. 01/28/2025   , RSV: No results found for: \"RSV\", FLU: No components found for: \"INFLUENZA\"    Imaging Results Review: I reviewed radiology reports from this admission including: chest xray and CT head.  Other Study Results Review: EKG was reviewed.     Therapies:   Basic Mobility Inpatient Raw Score: 7  -[facility] Goal: 2: Bed activities/Dependent transfer  -[facility] Achieved: 2: Bed activities/Dependent transfer  PT: Consulted  OT: Consulted  ST: Consulted    VTE Prophylaxis: VTE covered by:  heparin (porcine), Subcutaneous, 5,000 Units at 01/29/25 0524       Code Status: Level 3 - DNAR and DNI  Advance Directive and Living Will:      Power of : Yes  POLST: Yes    Family and Social Support: Nancy Tobar      Goals of Care: Discussed with Nancy tobar. Currently in agreement with current code status of DNR/DNI and treating for acute conditions. Discussed safety and not being able to return home and in agreement. She is considering placement in facility pending if there are beds available after discharge    Administrative Statements   I have spent a total time of 75 minutes in caring for this patient on the day of the visit/encounter including Diagnostic results, Instructions for management, Patient and family education, Counseling / Coordination of care, Documenting in the medical record, Reviewing / ordering tests, medicine, procedures  , and Obtaining or reviewing history  .  "

## 2025-01-29 NOTE — ASSESSMENT & PLAN NOTE
Previously noted as Mildly frail due to independence with ADLs, needing some assistance with IADLs, however due to current condition consider Moderate Frailty  Multifactorial in the setting of advanced age, comorbidities, lives alone, current acute conditions  Albumin 4.2  Currently NPO  PT/OT/ST consulted

## 2025-01-29 NOTE — ED NOTES
Lab requests another lab redraw, this writer will attempt for 3rd time     Lina Norwood, MAIKEL  01/28/25 1941

## 2025-01-29 NOTE — ASSESSMENT & PLAN NOTE
Lab Results   Component Value Date    CREATININE 1.71 (H) 01/28/2025    CREATININE 1.39 (H) 01/28/2025    EGFR 32 01/28/2025    EGFR 41 01/28/2025       Hx of Stage 3a CKD  Baseline Cr appears to be between 1-1.1  Receiving IV 75 mL/hr  Likely dehydration d/t unknown LKW time and on floor of home

## 2025-01-29 NOTE — ASSESSMENT & PLAN NOTE
May be secondary to COVID.  He has underlying cognitive issues as well that might be worsening.  He was also hypothermic.  Try to reverse underlying causes and then if no improvement may need to consider neurology evaluation or may need to consider palliative care given his advanced age  Geriatric evaluation   Discussed hospitalization delirium precautions  Agree with UA microscopic initiation of ceftriaxone  Patient was recently declined from hospice  Patient does have multiple comorbid conditions prognosis is guarded  Lengthy discussion with daughter she was uncertain if she wanted treatment versus comfort even after lengthy discussion she was agreeable to a stat palliative care consult reached out to palliative care who came to the floor immediately and did a stat consult and daughter at this time desired comfort care and no aggressive measures

## 2025-01-29 NOTE — ASSESSMENT & PLAN NOTE
Comfort orders  Acetaminophen 650mg q6h PRN mild pain  Acetaminophen 1,000mg IV at 400ml/hr q6h PRN mod pain  Hydromorphone 0.3mg IV q2h PRN sev pain/dyspnea  Ondansetron 4mg IV q6h PRN nausea  Haldol 0.5mg IV q2h PRN agitation, vomiting  Robinul 0.1mg IV q4h PRN pulmonary secretions

## 2025-01-29 NOTE — ED NOTES
Lab request redraw on labs, this writer and another RN working on samples at this time     Lina Norwood, MAIKEL  01/28/25 1940

## 2025-01-29 NOTE — ASSESSMENT & PLAN NOTE
Likely present on admission evident by leukocytosis, hypothermia, tachycardia in setting of COVID and pneumonia  Blood cultures x 2 pending continue to trend  Will give a one-time IV fluid bolus 1 L times now and resume on normal saline  Pro-Jerry noted to be elevated at 1.65 initiated on IV ceftriaxone  For completeness we will check a strep and Legionella  At this time daughter desires no further treatment and is made her dad comfort care

## 2025-01-29 NOTE — ASSESSMENT & PLAN NOTE
Gracia hodges initially overall temp has improved  No further vital sign monitoring as daughter has gone comfort care

## 2025-01-29 NOTE — ASSESSMENT & PLAN NOTE
Pt found to have leukocytosis, hypothermia, tachycardia present on arrival secondary to COVID pneumonia  Started on IV fluids and Remdesivir

## 2025-01-29 NOTE — ASSESSMENT & PLAN NOTE
Noted cognitive decline by daughter over past year  Alert and Oriented X 1 to self only today upon exam  Baseline: AOx2/3, self, place, sometimes time/situation  No formal cognitive testing per chart review  CT scan: chronic moderate microangiopathy, negative for acute intracranial abnormality  TSH 5/2024: 0.774, No Vit B12  Encourage physical, social and mental activities as able  Do not recommend medications to improve memory at this time due to advanced age, risks vs benefits  Delirium precautions

## 2025-01-29 NOTE — ASSESSMENT & PLAN NOTE
Lives at home alone  Daughter lives 5 minutes away and visits couple times/week  Per daughter, pt independent with ADLs for the most part  Had been looking into assistance last May and does not know why Hospice was considered, however knows not accepted, but did not receive follow up with help at home for patient  Daughter had hired a visiting aide to start coming once per week however never started prior to this admission  Patient does not drive  Has not taken medications for over a year due to not needing them, not noncompliance  No reported falls within the last 6 months  Spoke with daughter about home safety and in agreement that patient should not return home

## 2025-01-30 LAB
ATRIAL RATE: 70 BPM
BACTERIA UR CULT: NORMAL
P AXIS: 51 DEGREES
PR INTERVAL: 218 MS
QRS AXIS: -80 DEGREES
QRSD INTERVAL: 80 MS
QT INTERVAL: 404 MS
QTC INTERVAL: 436 MS
T WAVE AXIS: 23 DEGREES
VENTRICULAR RATE: 70 BPM

## 2025-01-30 NOTE — DEATH NOTE
INPATIENT DEATH NOTE  Kevin Rodriguez 89 y.o. male MRN: [id number]  Unit/Bed#: -01 Encounter: [id number]    Date, Time and Cause of Death    Date of Death: 25  Time of Death:  8:11 PM  Preliminary Cause of Death: Multi-organ failure  Entered by: Madiha Smith PA-C[TS1.1]       Attribution       TS1.1 Madiha Smith PA-C 25 20:31             Patient's Information  Pronounced by: Madiha Smith PA-C  Did the patient's death occur in the ED?: No  Did the patient's death occur in the OR?: No  Did the patient's death occur less than 10 days post-op?: No  Did the patient's death occur within 24 hours of admission?: No  Was code status DNR at the time of death?: Yes    PHYSICAL EXAM:  Unresponsive to noxious stimuli, Spontaneous respirations absent, Breath sounds absent, Carotid pulse absent, Heart sounds absent, Pupillary light reflex absent, and Corneal blink reflex absent    Medical Examiner notification criteria:  NONE APPLICABLE   Medical Examiner's office notified?:  No, does not meet ME notification criteria   Medical Examiner accepted case?:  No  Name of Medical Examiner: n/a    Family Notification  Was the family notified?: Yes  Date Notified: 25  Time Notified: 2030  Notified by: Madiha Smith PA-C  Name of Family Notified of Death: Nancy   Relationship to Patient: Daughter  Family Notification Route: Telephone  Was the family told to contact a  home?: Yes  Name of  Home:: TBD, not confirmed yet    Autopsy Options:  Post-mortem examination declined by next of kin    Primary Service Attending Physician notified?:  yes, to be notified in the morning    Physician/Resident responsible for completing Discharge Summary:  Madiha Smith PA-C

## 2025-01-30 NOTE — NURSING NOTE
Family arrived. Bedside for 5 minutes. Family left and provided  information. Sent over to provider.

## 2025-01-30 NOTE — ASSESSMENT & PLAN NOTE
Gentle hydration initiated  Creatinine has up trended however patient is on remdesivir  Daughter did not want restraints given father kept pulling out IV was back-and-forth momentarily on treatment versus comfort  That palliative care consult and daughter decided on comfort care

## 2025-01-30 NOTE — ASSESSMENT & PLAN NOTE
Likely present on admission evident by leukocytosis, hypothermia, tachycardia in setting of COVID and pneumonia  Blood cultures x 2 pending , placed on comfort measures   Will give a one-time IV fluid bolus 1 L times now and resume on normal saline  Pro-Jerry noted to be elevated at 1.65 initiated on IV ceftriaxone, but placed on comfort measures   For completeness we will check a strep and Legionella  At this time daughter desires no further treatment and is made her dad comfort care, pt passed away on 1/29/25

## 2025-01-30 NOTE — ASSESSMENT & PLAN NOTE
Severe discontinued in setting of daughter desired comfort care for her father  Pt placed as level 4 comfort care, passed away on 1/29/25

## 2025-01-30 NOTE — ASSESSMENT & PLAN NOTE
May be secondary to COVID.  He has underlying cognitive issues as well that might be worsening.  He was also hypothermic.  Try to reverse underlying causes and then if no improvement may need to consider neurology evaluation or may need to consider palliative care given his advanced age  Geriatric evaluation   Discussed hospitalization delirium precautions  Agree with UA microscopic initiation of ceftriaxone  Patient was recently declined from hospice  Patient does have multiple comorbid conditions prognosis is guarded  Lengthy discussion with daughter she was uncertain if she wanted treatment versus comfort even after lengthy discussion she was agreeable to a stat palliative care consult reached out to palliative care who came to the floor immediately and did a stat consult and daughter at this time desired comfort care and no aggressive measures  Pt passed away on comfort measures on 1/29/25

## 2025-01-30 NOTE — DISCHARGE SUMMARY
Discharge Summary - Hospitalist   Name: Kevin Rodriguez 89 y.o. male I MRN: [id number]  Unit/Bed#: -01 I Date of Admission: 1/28/2025   Date of Service: 1/29/2025 I Hospital Day: 1     Assessment & Plan  Acute metabolic encephalopathy  May be secondary to COVID.  He has underlying cognitive issues as well that might be worsening.  He was also hypothermic.  Try to reverse underlying causes and then if no improvement may need to consider neurology evaluation or may need to consider palliative care given his advanced age  Geriatric evaluation   Discussed hospitalization delirium precautions  Agree with UA microscopic initiation of ceftriaxone  Patient was recently declined from hospice  Patient does have multiple comorbid conditions prognosis is guarded  Lengthy discussion with daughter she was uncertain if she wanted treatment versus comfort even after lengthy discussion she was agreeable to a stat palliative care consult reached out to palliative care who came to the floor immediately and did a stat consult and daughter at this time desired comfort care and no aggressive measures  Pt passed away on comfort measures on 1/29/25  COVID  Severe discontinued in setting of daughter desired comfort care for her father  Pt placed as level 4 comfort care, passed away on 1/29/25  Elevated troponin  Doubt cardiac.  Could be secondary to acute renal failure.   Patient initially trended down with troponins from 335>2 98 however up trended to 398   No further intervention as patient has gone comfort care by the daughter  Reggie hodges initially overall temp has improved  No further vital sign monitoring as daughter has gone comfort care  ARF (acute renal failure) (HCC)  Gentle hydration initiated  Creatinine has up trended however patient is on remdesivir  Daughter did not want restraints given father kept pulling out IV was back-and-forth momentarily on treatment versus comfort  That palliative care consult and  "daughter decided on comfort care  Sepsis (HCC)  Likely present on admission evident by leukocytosis, hypothermia, tachycardia in setting of COVID and pneumonia  Blood cultures x 2 pending , placed on comfort measures   Will give a one-time IV fluid bolus 1 L times now and resume on normal saline  Pro-Jerry noted to be elevated at 1.65 initiated on IV ceftriaxone, but placed on comfort measures   For completeness we will check a strep and Legionella  At this time daughter desires no further treatment and is made her dad comfort care, pt passed away on 1/29/25      Medical Problems       Resolved Problems  Date Reviewed: 3/14/2024   None       Discharging Physician / Practitioner: Madiha Smith PA-C  PCP: Parth Torres DO  Admission Date:   Admission Orders (From admission, onward)       Ordered        01/28/25 1723  Inpatient Admission  Once                          Discharge Date: 01/29/25    Consultations During Hospital Stay:  Geriatric medicine  Palliative care    Procedures Performed:   N/a    Significant Findings / Test Results:   COVID-19 positive, left-sided pneumonia most likely presenting on chest x-ray  RASHIDA on CMP    Incidental Findings:   N/a       Test Results Pending at Discharge (will require follow up):   N/a     Outpatient Tests Requested:  none    Complications:  n/a    Reason for Admission:    Chief Complaint   Patient presents with    Fall     Pt BIB EMS pt was found by family on floor yesterday covered in urine, pt mumbles words per family \"this is not normal for him\" last know well was Sunday.        Hospital Course:   Kevin Rodriguez is a 89 y.o. male patient who originally presented to the hospital on 1/28/2025 due to being found on the floor unknown how long he was on the floor for, appeared more confused from his baseline.  Patient found to be hypothermic, initially placed on Gracia hugger.  Was seen by geriatric medicine consult due to altered mental status and cognitive decline.  Also " seen by palliative care and consult is due to advanced age and decline as well as COVID infection daughter considering comfort measures versus aggressive treatment.  After palliative care consult, patient's daughter decided to place patient on level 4 comfort measures.  On 2025 patient passed away        The patient, initially admitted to the hospital as inpatient, was discharged earlier than expected given the following: pt  .  Please see above list of diagnoses and related plan for additional information.     Condition at Discharge:       Discharge Day Visit / Exam:   * Please refer to separate progress note for these details *    Discussion with Family:  called pt's daughterNancy.     Discharge instructions/Information to patient and family:   Patient passed away    Provisions for Follow-Up Care:  Patient passed away    Disposition:   Other: passed away    Planned Readmission: no    Discharge Medications:  Pt passed away     Administrative Statements   Discharge Statement:  I have spent a total time of 50 minutes in caring for this patient on the day of the visit/encounter. >30 minutes of time was spent on: Prognosis, Impressions, Documenting in the medical record, and Reviewing / ordering tests, medicine, procedures  .    **Please Note: This note may have been constructed using a voice recognition system**

## 2025-02-02 LAB
BACTERIA BLD CULT: NORMAL
BACTERIA BLD CULT: NORMAL
